# Patient Record
Sex: FEMALE | Race: WHITE | NOT HISPANIC OR LATINO | Employment: UNEMPLOYED | URBAN - NONMETROPOLITAN AREA
[De-identification: names, ages, dates, MRNs, and addresses within clinical notes are randomized per-mention and may not be internally consistent; named-entity substitution may affect disease eponyms.]

---

## 2023-01-26 PROBLEM — M54.16 CHRONIC RADICULAR LOW BACK PAIN: Status: ACTIVE | Noted: 2023-01-26

## 2023-01-26 PROBLEM — M50.10 CERVICAL DISC DISORDER WITH RADICULOPATHY: Status: ACTIVE | Noted: 2023-01-26

## 2023-01-26 PROBLEM — N28.1 SIMPLE CYST OF KIDNEY: Status: ACTIVE | Noted: 2023-01-26

## 2023-01-26 PROBLEM — H92.03 EARACHE SYMPTOMS IN BOTH EARS: Status: ACTIVE | Noted: 2023-01-26

## 2023-01-26 PROBLEM — G44.86 HEADACHE, CERVICOGENIC: Status: ACTIVE | Noted: 2023-01-26

## 2023-01-26 PROBLEM — R11.0 NAUSEA IN ADULT: Status: ACTIVE | Noted: 2023-01-26

## 2023-01-26 PROBLEM — M25.879 ANKLE IMPINGEMENT SYNDROME: Status: ACTIVE | Noted: 2023-01-26

## 2023-01-26 PROBLEM — M50.90 CERVICAL DISC DISEASE: Status: ACTIVE | Noted: 2023-01-26

## 2023-01-26 PROBLEM — T75.3XXA SEASICKNESS: Status: ACTIVE | Noted: 2023-01-26

## 2023-01-26 PROBLEM — R03.0 BLOOD PRESSURE ELEVATED WITHOUT HISTORY OF HTN: Status: ACTIVE | Noted: 2023-01-26

## 2023-01-26 PROBLEM — R09.81 NASAL CONGESTION: Status: ACTIVE | Noted: 2023-01-26

## 2023-01-26 PROBLEM — M25.579 ANKLE PAIN: Status: ACTIVE | Noted: 2023-01-26

## 2023-01-26 PROBLEM — M79.18 CERVICAL MYOFASCIAL PAIN SYNDROME: Status: ACTIVE | Noted: 2023-01-26

## 2023-01-26 PROBLEM — E78.5 DYSLIPIDEMIA: Status: ACTIVE | Noted: 2023-01-26

## 2023-01-26 PROBLEM — G89.18 POST-OP PAIN: Status: ACTIVE | Noted: 2023-01-26

## 2023-01-26 PROBLEM — K14.6 GLOSSODYNIA: Status: ACTIVE | Noted: 2023-01-26

## 2023-01-26 PROBLEM — N89.8 VAGINAL ITCHING: Status: ACTIVE | Noted: 2023-01-26

## 2023-01-26 PROBLEM — M47.816 ARTHRITIS OF LUMBAR SPINE: Status: ACTIVE | Noted: 2023-01-26

## 2023-01-26 PROBLEM — R94.31 ABNORMAL EKG: Status: ACTIVE | Noted: 2023-01-26

## 2023-01-26 PROBLEM — M96.1 CERVICAL POST-LAMINECTOMY SYNDROME: Status: ACTIVE | Noted: 2023-01-26

## 2023-01-26 PROBLEM — M54.2 CERVICAL PAIN: Status: ACTIVE | Noted: 2023-01-26

## 2023-01-26 PROBLEM — Q66.70 PES CAVUS, CONGENITAL: Status: ACTIVE | Noted: 2023-01-26

## 2023-01-26 PROBLEM — G89.29 CHRONIC RADICULAR LOW BACK PAIN: Status: ACTIVE | Noted: 2023-01-26

## 2023-01-26 PROBLEM — M25.373 INSTABILITY OF ANKLE: Status: ACTIVE | Noted: 2023-01-26

## 2023-01-26 PROBLEM — Z86.19 HISTORY OF COLD SORES: Status: ACTIVE | Noted: 2023-01-26

## 2023-01-26 PROBLEM — M54.6 BACK PAIN, THORACIC: Status: ACTIVE | Noted: 2023-01-26

## 2023-01-26 PROBLEM — M47.816 LUMBAR SPONDYLOSIS: Status: ACTIVE | Noted: 2023-01-26

## 2023-01-26 PROBLEM — K21.9 GERD (GASTROESOPHAGEAL REFLUX DISEASE): Status: ACTIVE | Noted: 2023-01-26

## 2023-01-26 PROBLEM — K58.0 IRRITABLE BOWEL SYNDROME WITH DIARRHEA: Status: ACTIVE | Noted: 2023-01-26

## 2023-01-26 PROBLEM — M47.812 SPONDYLOSIS OF CERVICAL REGION WITHOUT MYELOPATHY OR RADICULOPATHY: Status: ACTIVE | Noted: 2023-01-26

## 2023-01-26 PROBLEM — M51.16 DISPLACEMENT OF LUMBAR DISC WITH RADICULOPATHY: Status: ACTIVE | Noted: 2023-01-26

## 2023-01-26 PROBLEM — M54.81 BILATERAL OCCIPITAL NEURALGIA: Status: ACTIVE | Noted: 2023-01-26

## 2023-01-26 PROBLEM — R10.9 ABDOMINAL PAIN: Status: ACTIVE | Noted: 2023-01-26

## 2023-01-26 PROBLEM — B37.0 THRUSH, ORAL: Status: ACTIVE | Noted: 2023-01-26

## 2023-01-26 PROBLEM — E66.3 OVERWEIGHT WITH BODY MASS INDEX (BMI) OF 29 TO 29.9 IN ADULT: Status: ACTIVE | Noted: 2023-01-26

## 2023-01-26 PROBLEM — R74.8 ALKALINE PHOSPHATASE ELEVATION: Status: ACTIVE | Noted: 2023-01-26

## 2023-01-26 PROBLEM — M25.561 KNEE PAIN, RIGHT: Status: ACTIVE | Noted: 2023-01-26

## 2023-01-26 PROBLEM — G43.909 MIGRAINE: Status: ACTIVE | Noted: 2023-01-26

## 2023-01-26 PROBLEM — H92.01 RIGHT EAR PAIN: Status: ACTIVE | Noted: 2023-01-26

## 2023-01-26 PROBLEM — M54.6 THORACIC BACK PAIN: Status: ACTIVE | Noted: 2023-01-26

## 2023-01-26 PROBLEM — M51.26 LUMBAR HERNIATED DISC: Status: ACTIVE | Noted: 2023-01-26

## 2023-01-26 PROBLEM — S86.319A PERONEAL TENDON TEAR: Status: ACTIVE | Noted: 2023-01-26

## 2023-01-26 PROBLEM — R42 VERTIGO: Status: ACTIVE | Noted: 2023-01-26

## 2023-01-26 PROBLEM — M50.30 DDD (DEGENERATIVE DISC DISEASE), CERVICAL: Status: ACTIVE | Noted: 2023-01-26

## 2023-01-26 RX ORDER — PREGABALIN 75 MG/1
CAPSULE ORAL
COMMUNITY
Start: 2022-09-11 | End: 2023-03-09 | Stop reason: WASHOUT

## 2023-01-26 RX ORDER — TOPIRAMATE 100 MG/1
TABLET, FILM COATED ORAL
COMMUNITY
Start: 2020-01-03 | End: 2023-03-09 | Stop reason: WASHOUT

## 2023-01-26 RX ORDER — CLONAZEPAM 0.5 MG/1
TABLET ORAL
COMMUNITY
Start: 2022-09-22 | End: 2023-03-09 | Stop reason: WASHOUT

## 2023-01-26 RX ORDER — CHLORHEXIDINE GLUCONATE ORAL RINSE 1.2 MG/ML
SOLUTION DENTAL
COMMUNITY
End: 2023-03-09 | Stop reason: WASHOUT

## 2023-01-26 RX ORDER — MECLIZINE HCL 12.5 MG 12.5 MG/1
12.5 TABLET ORAL EVERY 8 HOURS PRN
COMMUNITY
End: 2023-05-05 | Stop reason: SDUPTHER

## 2023-01-26 RX ORDER — ONDANSETRON 4 MG/1
4 TABLET, ORALLY DISINTEGRATING ORAL 3 TIMES DAILY PRN
COMMUNITY
Start: 2022-07-08 | End: 2023-03-09 | Stop reason: SDUPTHER

## 2023-01-26 RX ORDER — IBUPROFEN 800 MG/1
TABLET ORAL
COMMUNITY
Start: 2015-06-24 | End: 2023-03-09 | Stop reason: WASHOUT

## 2023-01-26 RX ORDER — CLOTRIMAZOLE 10 MG/1
LOZENGE ORAL; TOPICAL
COMMUNITY
End: 2023-03-09 | Stop reason: WASHOUT

## 2023-01-26 RX ORDER — PROMETHAZINE HYDROCHLORIDE 12.5 MG/1
12.5 TABLET ORAL EVERY 6 HOURS PRN
COMMUNITY
End: 2023-03-09 | Stop reason: SDUPTHER

## 2023-01-26 RX ORDER — HYOSCYAMINE SULFATE 0.125 MG
0.12 TABLET ORAL
COMMUNITY
End: 2024-01-02

## 2023-01-26 RX ORDER — CYCLOBENZAPRINE HCL 10 MG
TABLET ORAL
COMMUNITY
Start: 2015-09-02 | End: 2023-03-09

## 2023-01-26 RX ORDER — PRAVASTATIN SODIUM 10 MG/1
10 TABLET ORAL DAILY
COMMUNITY
End: 2023-03-09 | Stop reason: SDUPTHER

## 2023-01-26 RX ORDER — ESOMEPRAZOLE MAGNESIUM 40 MG/1
CAPSULE, DELAYED RELEASE ORAL
COMMUNITY
Start: 2020-12-02 | End: 2024-04-05 | Stop reason: SDUPTHER

## 2023-01-26 RX ORDER — RIZATRIPTAN BENZOATE 10 MG/1
TABLET ORAL
COMMUNITY
Start: 2016-02-03 | End: 2023-03-09 | Stop reason: SDUPTHER

## 2023-03-09 ENCOUNTER — OFFICE VISIT (OUTPATIENT)
Dept: PRIMARY CARE | Facility: CLINIC | Age: 63
End: 2023-03-09
Payer: COMMERCIAL

## 2023-03-09 VITALS
TEMPERATURE: 97.8 F | OXYGEN SATURATION: 96 % | WEIGHT: 206.8 LBS | SYSTOLIC BLOOD PRESSURE: 112 MMHG | HEART RATE: 92 BPM | DIASTOLIC BLOOD PRESSURE: 60 MMHG | HEIGHT: 71 IN | BODY MASS INDEX: 28.95 KG/M2

## 2023-03-09 DIAGNOSIS — G44.86 HEADACHE, CERVICOGENIC: Primary | ICD-10-CM

## 2023-03-09 DIAGNOSIS — E78.5 DYSLIPIDEMIA: ICD-10-CM

## 2023-03-09 DIAGNOSIS — R11.0 NAUSEA: ICD-10-CM

## 2023-03-09 PROCEDURE — 99214 OFFICE O/P EST MOD 30 MIN: CPT | Performed by: PHYSICIAN ASSISTANT

## 2023-03-09 PROCEDURE — 1036F TOBACCO NON-USER: CPT | Performed by: PHYSICIAN ASSISTANT

## 2023-03-09 PROCEDURE — 96372 THER/PROPH/DIAG INJ SC/IM: CPT | Performed by: PHYSICIAN ASSISTANT

## 2023-03-09 RX ORDER — METHYLPREDNISOLONE SODIUM SUCCINATE 125 MG/2ML
125 INJECTION INTRAMUSCULAR; INTRAVENOUS ONCE
Status: COMPLETED | OUTPATIENT
Start: 2023-03-09 | End: 2023-03-09

## 2023-03-09 RX ORDER — PROMETHAZINE HYDROCHLORIDE 12.5 MG/1
12.5 TABLET ORAL EVERY 6 HOURS PRN
Qty: 30 TABLET | Refills: 3 | Status: SHIPPED | OUTPATIENT
Start: 2023-03-09 | End: 2023-03-09 | Stop reason: WASHOUT

## 2023-03-09 RX ORDER — RIZATRIPTAN BENZOATE 10 MG/1
TABLET ORAL
Qty: 9 TABLET | Refills: 3 | Status: SHIPPED | OUTPATIENT
Start: 2023-03-09 | End: 2023-04-14 | Stop reason: SDUPTHER

## 2023-03-09 RX ORDER — IBUPROFEN 800 MG/1
800 TABLET ORAL EVERY 8 HOURS PRN
COMMUNITY
End: 2023-07-18 | Stop reason: SDUPTHER

## 2023-03-09 RX ORDER — PRAVASTATIN SODIUM 10 MG/1
10 TABLET ORAL NIGHTLY
Qty: 30 TABLET | Refills: 5 | Status: SHIPPED | OUTPATIENT
Start: 2023-03-09 | End: 2023-10-12

## 2023-03-09 RX ORDER — METHOCARBAMOL 750 MG/1
1 TABLET, FILM COATED ORAL 3 TIMES DAILY
COMMUNITY
Start: 2023-02-13 | End: 2023-04-14 | Stop reason: SDUPTHER

## 2023-03-09 RX ORDER — ONDANSETRON 4 MG/1
4 TABLET, ORALLY DISINTEGRATING ORAL 3 TIMES DAILY PRN
Qty: 20 TABLET | Refills: 3 | Status: SHIPPED | OUTPATIENT
Start: 2023-03-09 | End: 2023-04-14 | Stop reason: WASHOUT

## 2023-03-09 RX ORDER — PROMETHAZINE HYDROCHLORIDE 25 MG/1
25 TABLET ORAL EVERY 8 HOURS PRN
COMMUNITY
Start: 2022-12-05 | End: 2023-03-10 | Stop reason: ALTCHOICE

## 2023-03-09 RX ORDER — PREDNISONE 20 MG/1
20 TABLET ORAL 2 TIMES DAILY
Qty: 10 TABLET | Refills: 0 | Status: SHIPPED | OUTPATIENT
Start: 2023-03-09 | End: 2023-03-14

## 2023-03-09 RX ORDER — PREGABALIN 75 MG/1
75 CAPSULE ORAL 4 TIMES DAILY
COMMUNITY
End: 2023-04-14 | Stop reason: SDUPTHER

## 2023-03-09 RX ORDER — CLONAZEPAM 0.5 MG/1
0.5 TABLET ORAL NIGHTLY
COMMUNITY
End: 2023-07-18 | Stop reason: SDUPTHER

## 2023-03-09 RX ORDER — ONDANSETRON 4 MG/1
4 TABLET, FILM COATED ORAL EVERY 8 HOURS PRN
COMMUNITY
End: 2023-10-20 | Stop reason: SDUPTHER

## 2023-03-09 RX ORDER — TOPIRAMATE 100 MG/1
100 TABLET, FILM COATED ORAL DAILY
COMMUNITY
End: 2023-10-19 | Stop reason: SDUPTHER

## 2023-03-09 RX ADMIN — METHYLPREDNISOLONE SODIUM SUCCINATE 125 MG: 125 INJECTION INTRAMUSCULAR; INTRAVENOUS at 17:36

## 2023-03-09 ASSESSMENT — PATIENT HEALTH QUESTIONNAIRE - PHQ9
1. LITTLE INTEREST OR PLEASURE IN DOING THINGS: NOT AT ALL
SUM OF ALL RESPONSES TO PHQ9 QUESTIONS 1 AND 2: 0
2. FEELING DOWN, DEPRESSED OR HOPELESS: NOT AT ALL

## 2023-03-09 ASSESSMENT — ENCOUNTER SYMPTOMS
DEPRESSION: 0
LOSS OF SENSATION IN FEET: 0
OCCASIONAL FEELINGS OF UNSTEADINESS: 0

## 2023-03-09 NOTE — PROGRESS NOTES
Subjective   Patient ID: Lorraine Trivedi is a 62 y.o. female who presents for Migraine and Neck Pain (Requesting steroid pack for neck pain, cervical neuralgia.  Started on pravastatin last visit, no issues.  Dr. Kimberly Ely, Pain management is retiring, have 5 rx's with her, lyrica, methacarbomal, clonazepam, topiramate, ibuprofen.  Need refills).    HPI   Lorraine Trivedi is a 62 y.o. year old female patient with   Past Medical History:   Diagnosis Date    Radiculopathy, lumbar region 07/22/2015    Chronic radicular lumbar pain    presenting to clinic with concern for   Chief Complaint   Patient presents with    Migraine    Neck Pain     Requesting steroid pack for neck pain, cervical neuralgia.  Started on pravastatin last visit, no issues.  Dr. Kimberly Ely, Pain management is retiring, have 5 rx's with her, lyrica, methacarbomal, clonazepam, topiramate, ibuprofen.  Need refills      Current Outpatient Medications:     clonazePAM (KlonoPIN) 0.5 mg tablet, TAKE 1 TABLET AT BEDTIME., Disp: , Rfl:     esomeprazole (NexIUM) 40 mg DR capsule, TAKE 1 CAPSULE TWICE DAILY 30 MINUTES PRIOR TO BREAKFAST AND DINNER., Disp: , Rfl:     ibuprofen 800 mg tablet, TAKE ONE TABLET BY MOUTH THREE TIMES A DAY, Disp: , Rfl:     meclizine (Antivert) 12.5 mg tablet, Take 1 tablet (12.5 mg) by mouth every 8 hours if needed (For Vertigo)., Disp: , Rfl:     pregabalin (Lyrica) 75 mg capsule, TAKE 1 CAPSULE 4 TIMES DAILY, Disp: , Rfl:     rifAXIMin (Xifaxan) 550 mg tablet, Take 1 tablet (550 mg) by mouth in the morning and 1 tablet (550 mg) in the evening and 1 tablet (550 mg) before bedtime., Disp: , Rfl:     topiramate (Topamax) 100 mg tablet, TAKE ONE TABLET BY MOUTH EVERY DAY, Disp: , Rfl:     chlorhexidine (Peridex) 0.12 % solution, Soak dental appliance in solution 10 minutes and then rinse thoroughly. Brush your dentures as usual., Disp: , Rfl:     clotrimazole (Mycelex) 10 mg ernestine, Allow 1 to dissolve slowly in mouth 5 times daily as  "directed., Disp: , Rfl:     hyoscyamine (Anaspaz, Levsin) 0.125 mg tablet, Take 1 tablet (0.125 mg) by mouth. 3-4 times daily., Disp: , Rfl:     ondansetron ODT (Zofran-ODT) 4 mg disintegrating tablet, Take 1 tablet (4 mg) by mouth 3 times a day as needed for nausea., Disp: 20 tablet, Rfl: 3    pravastatin (Pravachol) 10 mg tablet, Take 1 tablet (10 mg) by mouth once daily at bedtime., Disp: 30 tablet, Rfl: 5    predniSONE (Deltasone) 20 mg tablet, Take 1 tablet (20 mg) by mouth in the morning and 1 tablet (20 mg) before bedtime. Do all this for 5 days., Disp: 10 tablet, Rfl: 0    promethazine (Phenergan) 12.5 mg tablet, Take 1 tablet (12.5 mg) by mouth every 6 hours if needed for nausea., Disp: 30 tablet, Rfl: 3    rizatriptan (Maxalt) 10 mg tablet, take 1 tablet by mouth at onset of headache, may repeat every 2 hours as needed. max of 3 tablets in 24 hours, Disp: 9 tablet, Rfl: 3.       Review of Systems  Review of Systems:   Constitutional: Denies fever  HEENT: Denies ST, earache  CVS: Denies Chest pain  Pulmonary: Denies wheezing, SOB  GI: Denies N/V  : Denies dysuria  Musculoskeletal:  Denies myalgia  Neuro: Denies focal weakness or numbness.  Skin: Denies Rashes.  *Review of Systems is negative unless otherwise mentioned in HPI or ROS above.  Objective   /60   Pulse 92   Temp 36.6 °C (97.8 °F)   Ht 1.803 m (5' 11\")   Wt 93.8 kg (206 lb 12.8 oz)   SpO2 96%   BMI 28.84 kg/m²     Physical Exam  Physical exam:  /60   Pulse 92   Temp 36.6 °C (97.8 °F)   Ht 1.803 m (5' 11\")   Wt 93.8 kg (206 lb 12.8 oz)   SpO2 96%   BMI 28.84 kg/m²  reviewed Body mass index is 28.84 kg/m².   Constitutional: NAD. Afebrile. Resting comfortably.  ENT: Nasal mucosa and oropharynx: moist oral mucosa. Posterior oropharynx nonerythematous. No posterior pharyngeal streaking. Left forehead and lateral cheek facial tenderness noted   Eyes: PERRLA. EOM intact. Photophobia noted.  Cardiac: Regular rate & rhythm. No " murmur, gallops, or rubs.  Pulmonary: Lungs clear to auscultation bilaterally with good aeration. No wheezes, rhonchi, or rales.   GI: Soft, Nontender, nondistended.   Musculoskeletal: No peripheral edema.   Skin: No evidence of trauma. No rashes  Neuro: No focal neuro deficits. Normal gait without assistive devices.  Psych: Intact judgement and insight.    Assessment/Plan   Problem List Items Addressed This Visit       Dyslipidemia    Relevant Medications    pravastatin (Pravachol) 10 mg tablet    Headache, cervicogenic - Primary    Relevant Medications    rizatriptan (Maxalt) 10 mg tablet    predniSONE (Deltasone) 20 mg tablet    methylPREDNISolone sodium succinate (PF) (SOLU-Medrol) injection 125 mg     Other Visit Diagnoses       Nausea        Relevant Medications    ondansetron ODT (Zofran-ODT) 4 mg disintegrating tablet                I did agree to take over her Rx for Lyrica 75mg, topamax 100mg (for migraines/occipital neuralgia), ibuprofen 800mg (which we agreed that I will reduce to 600mg at the same frequency), methocarbamol 750mg tid PRN (muscle relaxant replaced flexeril a few months ago).    OARRS reviewe   Benzo agreement signed in EMR  Lyrica agreement signed- return in 30 days to begin Rx here

## 2023-03-10 DIAGNOSIS — R11.0 NAUSEA: Primary | ICD-10-CM

## 2023-03-10 RX ORDER — PROMETHAZINE HYDROCHLORIDE 12.5 MG/1
TABLET ORAL
Qty: 15 TABLET | Refills: 0 | OUTPATIENT
Start: 2023-03-10

## 2023-03-10 RX ORDER — PROMETHAZINE HYDROCHLORIDE 12.5 MG/1
12.5 TABLET ORAL EVERY 6 HOURS PRN
Qty: 30 TABLET | Refills: 3 | Status: SHIPPED | OUTPATIENT
Start: 2023-03-10 | End: 2023-03-11 | Stop reason: SDUPTHER

## 2023-03-11 DIAGNOSIS — R11.0 NAUSEA: ICD-10-CM

## 2023-03-11 RX ORDER — PROMETHAZINE HYDROCHLORIDE 12.5 MG/1
12.5 TABLET ORAL EVERY 6 HOURS PRN
Qty: 30 TABLET | Refills: 3 | Status: SHIPPED | OUTPATIENT
Start: 2023-03-11 | End: 2023-04-14 | Stop reason: SDUPTHER

## 2023-04-11 ENCOUNTER — APPOINTMENT (OUTPATIENT)
Dept: PRIMARY CARE | Facility: CLINIC | Age: 63
End: 2023-04-11
Payer: COMMERCIAL

## 2023-04-14 ENCOUNTER — OFFICE VISIT (OUTPATIENT)
Dept: PRIMARY CARE | Facility: CLINIC | Age: 63
End: 2023-04-14
Payer: COMMERCIAL

## 2023-04-14 VITALS
WEIGHT: 204 LBS | TEMPERATURE: 98.2 F | HEART RATE: 88 BPM | SYSTOLIC BLOOD PRESSURE: 142 MMHG | DIASTOLIC BLOOD PRESSURE: 73 MMHG | HEIGHT: 70 IN | BODY MASS INDEX: 29.2 KG/M2 | OXYGEN SATURATION: 96 %

## 2023-04-14 DIAGNOSIS — G43.709 CHRONIC MIGRAINE WITHOUT AURA WITHOUT STATUS MIGRAINOSUS, NOT INTRACTABLE: ICD-10-CM

## 2023-04-14 DIAGNOSIS — R11.0 NAUSEA: ICD-10-CM

## 2023-04-14 DIAGNOSIS — Z12.31 ENCOUNTER FOR SCREENING MAMMOGRAM FOR BREAST CANCER: Primary | ICD-10-CM

## 2023-04-14 DIAGNOSIS — D17.1 LIPOMA OF BACK: ICD-10-CM

## 2023-04-14 DIAGNOSIS — R42 DIZZINESS: ICD-10-CM

## 2023-04-14 DIAGNOSIS — G44.86 HEADACHE, CERVICOGENIC: ICD-10-CM

## 2023-04-14 DIAGNOSIS — E78.5 BORDERLINE HYPERLIPIDEMIA: ICD-10-CM

## 2023-04-14 DIAGNOSIS — R53.1 GENERAL WEAKNESS: ICD-10-CM

## 2023-04-14 DIAGNOSIS — N39.0 URINARY TRACT INFECTION WITHOUT HEMATURIA, SITE UNSPECIFIED: ICD-10-CM

## 2023-04-14 LAB
POC APPEARANCE, URINE: CLEAR
POC BILIRUBIN, URINE: NEGATIVE
POC BLOOD, URINE: NEGATIVE
POC COLOR, URINE: YELLOW
POC GLUCOSE, URINE: NEGATIVE MG/DL
POC KETONES, URINE: NEGATIVE MG/DL
POC LEUKOCYTES, URINE: NEGATIVE
POC NITRITE,URINE: NEGATIVE
POC PH, URINE: 6 PH
POC PROTEIN, URINE: NEGATIVE MG/DL
POC SPECIFIC GRAVITY, URINE: 1.01
POC UROBILINOGEN, URINE: 0.2 EU/DL

## 2023-04-14 PROCEDURE — 81003 URINALYSIS AUTO W/O SCOPE: CPT | Performed by: PHYSICIAN ASSISTANT

## 2023-04-14 PROCEDURE — 99214 OFFICE O/P EST MOD 30 MIN: CPT | Performed by: PHYSICIAN ASSISTANT

## 2023-04-14 PROCEDURE — 1036F TOBACCO NON-USER: CPT | Performed by: PHYSICIAN ASSISTANT

## 2023-04-14 RX ORDER — METHOCARBAMOL 750 MG/1
750 TABLET, FILM COATED ORAL 3 TIMES DAILY PRN
Qty: 270 TABLET | Refills: 0 | Status: SHIPPED | OUTPATIENT
Start: 2023-04-14 | End: 2023-07-18 | Stop reason: SDUPTHER

## 2023-04-14 RX ORDER — PREGABALIN 75 MG/1
75 CAPSULE ORAL 3 TIMES DAILY
Qty: 90 CAPSULE | Refills: 0 | Status: SHIPPED | OUTPATIENT
Start: 2023-05-15 | End: 2023-04-17 | Stop reason: DRUGHIGH

## 2023-04-14 RX ORDER — RIZATRIPTAN BENZOATE 10 MG/1
TABLET ORAL
Qty: 30 TABLET | Refills: 3 | Status: SHIPPED | OUTPATIENT
Start: 2023-04-14 | End: 2023-05-25 | Stop reason: ALTCHOICE

## 2023-04-14 RX ORDER — PREGABALIN 75 MG/1
75 CAPSULE ORAL 3 TIMES DAILY
Qty: 90 CAPSULE | Refills: 0 | Status: SHIPPED | OUTPATIENT
Start: 2023-06-16 | End: 2023-04-17 | Stop reason: DRUGHIGH

## 2023-04-14 RX ORDER — PREGABALIN 75 MG/1
75 CAPSULE ORAL 3 TIMES DAILY
Qty: 90 CAPSULE | Refills: 0 | Status: SHIPPED | OUTPATIENT
Start: 2023-04-14 | End: 2023-04-17 | Stop reason: DRUGHIGH

## 2023-04-14 RX ORDER — PROMETHAZINE HYDROCHLORIDE 12.5 MG/1
25 TABLET ORAL EVERY 6 HOURS PRN
Qty: 90 TABLET | Refills: 3 | Status: SHIPPED | OUTPATIENT
Start: 2023-04-14 | End: 2023-05-24 | Stop reason: SDUPTHER

## 2023-04-14 ASSESSMENT — ANXIETY QUESTIONNAIRES
7. FEELING AFRAID AS IF SOMETHING AWFUL MIGHT HAPPEN: NOT AT ALL
6. BECOMING EASILY ANNOYED OR IRRITABLE: SEVERAL DAYS
4. TROUBLE RELAXING: NOT AT ALL
5. BEING SO RESTLESS THAT IT IS HARD TO SIT STILL: NOT AT ALL
1. FEELING NERVOUS, ANXIOUS, OR ON EDGE: NOT AT ALL
3. WORRYING TOO MUCH ABOUT DIFFERENT THINGS: NOT AT ALL
IF YOU CHECKED OFF ANY PROBLEMS ON THIS QUESTIONNAIRE, HOW DIFFICULT HAVE THESE PROBLEMS MADE IT FOR YOU TO DO YOUR WORK, TAKE CARE OF THINGS AT HOME, OR GET ALONG WITH OTHER PEOPLE: NOT DIFFICULT AT ALL
GAD7 TOTAL SCORE: 1
2. NOT BEING ABLE TO STOP OR CONTROL WORRYING: NOT AT ALL

## 2023-04-14 NOTE — PROGRESS NOTES
Subjective   Patient ID: Lorraine Trivedi is a 62 y.o. female who presents for Follow-up (Had a steroid injection on neck. Who was doing the injection noticed a lump on her back.), Nausea (Been peeing a lot, nausea all time, headaches all the time, does not feel well, tired, no energy, been going on for awhile.), and Med Refill (Lyrica refill).    HPI   Lorraine Trivedi is a 62 y.o. year old female patient with presenting to clinic with concern for   Chief Complaint   Patient presents with    Follow-up     Had a steroid injection on neck. Who was doing the injection noticed a lump on her back.    Nausea     Been peeing a lot, nausea all time, headaches all the time, does not feel well, tired, no energy, been going on for awhile.    Med Refill     Lyrica refill         Neurologist is planning to do botox injections of head and neck for migraine pain and neck. Neuro will also be doing her nerve blocks.     Pain mgt doctor is leaving the area and she had her last cervical block this week- has been more nauseous than usual this week.    Patient Active Problem List   Diagnosis    Abdominal pain    Abnormal EKG    Alkaline phosphatase elevation    Ankle impingement syndrome    Ankle pain    Blood pressure elevated without history of HTN    Cervical disc disease    DDD (degenerative disc disease), cervical    Cervical disc disorder with radiculopathy    Spondylosis of cervical region without myelopathy or radiculopathy    Bilateral occipital neuralgia    Cervical myofascial pain syndrome    Cervical pain    Cervical post-laminectomy syndrome    Chronic radicular low back pain    Dyslipidemia    GERD (gastroesophageal reflux disease)    Headache, cervicogenic    History of cold sores    Irritable bowel syndrome with diarrhea    Instability of ankle    Knee pain, right    Lumbar herniated disc    Displacement of lumbar disc with radiculopathy    Arthritis of lumbar spine    Lumbar spondylosis    Migraine    Nasal congestion     Peroneal tendon tear    Pes cavus, congenital    Post-op pain    Right ear pain    Seasickness    Simple cyst of kidney    Back pain, thoracic    Thoracic back pain    Vaginal itching    Vertigo    Thrush, oral    Overweight with body mass index (BMI) of 29 to 29.9 in adult    Nausea in adult    Glossodynia    Earache symptoms in both ears       Current Outpatient Medications:     clonazePAM (KlonoPIN) 0.5 mg tablet, Take 1 tablet (0.5 mg) by mouth once daily at bedtime. To help sleep with pain and anxiety, Disp: , Rfl:     esomeprazole (NexIUM) 40 mg DR capsule, TAKE 1 CAPSULE TWICE DAILY 30 MINUTES PRIOR TO BREAKFAST AND DINNER., Disp: , Rfl:     hyoscyamine (Anaspaz, Levsin) 0.125 mg tablet, Take 1 tablet (0.125 mg) by mouth. 3-4 times daily., Disp: , Rfl:     ibuprofen 800 mg tablet, Take 1 tablet (800 mg) by mouth every 8 hours if needed for mild pain (1 - 3), headaches or moderate pain (4 - 6)., Disp: , Rfl:     meclizine (Antivert) 12.5 mg tablet, Take 1 tablet (12.5 mg) by mouth every 8 hours if needed (For Vertigo)., Disp: , Rfl:     methocarbamol (Robaxin) 750 mg tablet, Take 1 tablet (750 mg) by mouth in the morning and 1 tablet (750 mg) in the evening and 1 tablet (750 mg) before bedtime. PRN., Disp: , Rfl:     ondansetron (Zofran) 4 mg tablet, Take 1 tablet (4 mg) by mouth every 8 hours if needed for nausea or vomiting., Disp: , Rfl:     pravastatin (Pravachol) 10 mg tablet, Take 1 tablet (10 mg) by mouth once daily at bedtime., Disp: 30 tablet, Rfl: 5    pregabalin (Lyrica) 75 mg capsule, Take 1 capsule (75 mg) by mouth in the morning and 1 capsule (75 mg) at noon and 1 capsule (75 mg) in the evening and 1 capsule (75 mg) before bedtime., Disp: , Rfl:     promethazine (Phenergan) 12.5 mg tablet, Take 1 tablet (12.5 mg) by mouth every 6 hours if needed for nausea or vomiting., Disp: 30 tablet, Rfl: 3    rifAXIMin (Xifaxan) 550 mg tablet, Take 1 tablet (550 mg) by mouth in the morning and 1 tablet (550  "mg) in the evening and 1 tablet (550 mg) before bedtime., Disp: , Rfl:     rizatriptan (Maxalt) 10 mg tablet, take 1 tablet by mouth at onset of headache, may repeat every 2 hours as needed. max of 3 tablets in 24 hours, Disp: 9 tablet, Rfl: 3    topiramate (Topamax) 100 mg tablet, Take 1 tablet (100 mg) by mouth once daily., Disp: , Rfl:     AGNES Reviewed today  ___________________________________________________________  Per last visit 3/9/23  I did agree to take over her Rx for Lyrica 75mg, topamax 100mg (for migraines/occipital neuralgia), ibuprofen 800mg (which we agreed that I will reduce to 600mg at the same frequency), methocarbamol 750mg tid PRN (muscle relaxant replaced flexeril a few months ago).     O[facility]RS reviewe  Benzo agreement signed in EMR  Lyrica agreement signed  _____________________________________________________________  She is returning today to begin these Rx as we had discussed.   Reviewed outside notes from neurology and most recent pain mgt note     Review of Systems  Review of Systems:   Constitutional: Denies fever  HEENT: Denies ST, earache  CVS: Denies Chest pain  Pulmonary: Denies wheezing, SOB  GI: Denies N/V  : Denies dysuria  Musculoskeletal:  Denies myalgia  Neuro: Denies focal weakness or numbness.  Skin: Denies Rashes.  *Review of Systems is negative unless otherwise mentioned in HPI or ROS above.  Objective   /73   Pulse 88   Temp 36.8 °C (98.2 °F)   Ht 1.778 m (5' 10\")   Wt 92.5 kg (204 lb)   SpO2 96%   BMI 29.27 kg/m²     Physical Exam  Physical exam:  /73   Pulse 88   Temp 36.8 °C (98.2 °F)   Ht 1.778 m (5' 10\")   Wt 92.5 kg (204 lb)   SpO2 96%   BMI 29.27 kg/m²  reviewed   Body mass index is 29.27 kg/m².     Constitutional: NAD.  Resting comfortably.  Head: Atraumatic, normocephalic.  EENT: deferred d/t masking  Cardiac: Regular rate & rhythm.   Pulmonary: Lungs clear bilat  GI: Soft, Nontender, nondistended.   Musculoskeletal: No peripheral " edema.   Skin: No evidence of trauma. No rashes. Small lipoma noted left mid thorax adjacent to tip of scapula  Psych: Intact judgement and insight.  Recent Results (from the past 4 hour(s))   POCT UA Automated manually resulted    Collection Time: 04/14/23 11:52 AM   Result Value Ref Range    POC Color, Urine Yellow Straw, Yellow, Light Yellow    POC Appearance, Urine Clear Clear    POC Specific Gravity, Urine 1.010 1.005 - 1.035    POC PH, Urine 6.0 No Reference Range Established PH    POC Protein, Urine NEGATIVE NEGATIVE, 30 (1+) mg/dl    POC Glucose, Urine NEGATIVE NEGATIVE mg/dl    POC Blood, Urine NEGATIVE NEGATIVE    POC Ketones, Urine NEGATIVE NEGATIVE mg/dl    POC Bilirubin, Urine NEGATIVE NEGATIVE    POC Urobilinogen, Urine 0.2 0.2, 1.0 EU/DL    Poc Nitrate, Urine NEGATIVE NEGATIVE    POC Leukocytes, Urine NEGATIVE NEGATIVE      Assessment/Plan   Problem List Items Addressed This Visit       Headache, cervicogenic    Relevant Medications    pregabalin (Lyrica) 75 mg capsule    pregabalin (Lyrica) 75 mg capsule (Start on 5/15/2023)    pregabalin (Lyrica) 75 mg capsule (Start on 6/16/2023)    rizatriptan (Maxalt) 10 mg tablet    methocarbamol (Robaxin) 750 mg tablet    Migraine    Relevant Medications    rizatriptan (Maxalt) 10 mg tablet     Other Visit Diagnoses       Encounter for screening mammogram for breast cancer    -  Primary    Relevant Orders    BI mammo bilateral screening tomosynthesis    Lipoma of back        Nausea        Relevant Medications    promethazine (Phenergan) 12.5 mg tablet    Other Relevant Orders    Magnesium    Vitamin B12    Lipase    Dizziness        Relevant Orders    Magnesium    Vitamin B12    CBC    Comprehensive Metabolic Panel    General weakness        Relevant Orders    Magnesium    Vitamin B12    CBC    Comprehensive Metabolic Panel    TSH with reflex to Free T4 if abnormal    Lipid Panel    Borderline hyperlipidemia        Relevant Orders    TSH with reflex to Free T4  if abnormal    Lipid Panel    Urinary tract infection without hematuria, site unspecified        Relevant Orders    POCT UA Automated manually resulted (Completed)            Current Outpatient Medications:     clonazePAM (KlonoPIN) 0.5 mg tablet, Take 1 tablet (0.5 mg) by mouth once daily at bedtime. To help sleep with pain and anxiety, Disp: , Rfl:     esomeprazole (NexIUM) 40 mg DR capsule, TAKE 1 CAPSULE TWICE DAILY 30 MINUTES PRIOR TO BREAKFAST AND DINNER., Disp: , Rfl:     hyoscyamine (Anaspaz, Levsin) 0.125 mg tablet, Take 1 tablet (0.125 mg) by mouth. 3-4 times daily., Disp: , Rfl:     ibuprofen 800 mg tablet, Take 1 tablet (800 mg) by mouth every 8 hours if needed for mild pain (1 - 3), headaches or moderate pain (4 - 6)., Disp: , Rfl:     meclizine (Antivert) 12.5 mg tablet, Take 1 tablet (12.5 mg) by mouth every 8 hours if needed (For Vertigo)., Disp: , Rfl:     ondansetron (Zofran) 4 mg tablet, Take 1 tablet (4 mg) by mouth every 8 hours if needed for nausea or vomiting., Disp: , Rfl:     pravastatin (Pravachol) 10 mg tablet, Take 1 tablet (10 mg) by mouth once daily at bedtime., Disp: 30 tablet, Rfl: 5    rifAXIMin (Xifaxan) 550 mg tablet, Take 1 tablet (550 mg) by mouth in the morning and 1 tablet (550 mg) in the evening and 1 tablet (550 mg) before bedtime., Disp: , Rfl:     topiramate (Topamax) 100 mg tablet, Take 1 tablet (100 mg) by mouth once daily., Disp: , Rfl:     methocarbamol (Robaxin) 750 mg tablet, Take 1 tablet (750 mg) by mouth 3 times a day as needed for muscle spasms. PRN, Disp: 270 tablet, Rfl: 0    pregabalin (Lyrica) 75 mg capsule, Take 1 capsule (75 mg) by mouth in the morning and 1 capsule (75 mg) in the evening and 1 capsule (75 mg) before bedtime., Disp: 90 capsule, Rfl: 0    [START ON 5/15/2023] pregabalin (Lyrica) 75 mg capsule, Take 1 capsule (75 mg) by mouth in the morning and 1 capsule (75 mg) in the evening and 1 capsule (75 mg) before bedtime. Do not start before May 15,  2023., Disp: 90 capsule, Rfl: 0    [START ON 6/16/2023] pregabalin (Lyrica) 75 mg capsule, Take 1 capsule (75 mg) by mouth in the morning and 1 capsule (75 mg) in the evening and 1 capsule (75 mg) before bedtime. Do not start before June 16, 2023., Disp: 90 capsule, Rfl: 0    promethazine (Phenergan) 12.5 mg tablet, Take 2 tablets (25 mg) by mouth every 6 hours if needed for nausea or vomiting., Disp: 90 tablet, Rfl: 3    rizatriptan (Maxalt) 10 mg tablet, take 1 tablet by mouth at onset of headache, may repeat every 2 hours as needed. max of 3 tablets in 24 hours, Disp: 30 tablet, Rfl: 3        Refilled maxalt, robaxin and increased phenergan dose to 25mg  Lyrica- 1 month at a time over the course of 3 months as discussed     (Did not refill Klonazepam today- pt stated she had enough, unclear if she has enough for 3 months until next visit, ok to fill before next visit BZD consent form signed last visit in Epic) KG      Needs another visit in 3 months.

## 2023-04-17 DIAGNOSIS — M50.10 CERVICAL DISC DISORDER WITH RADICULOPATHY: Primary | ICD-10-CM

## 2023-04-17 DIAGNOSIS — M54.81 BILATERAL OCCIPITAL NEURALGIA: ICD-10-CM

## 2023-04-17 RX ORDER — PREGABALIN 75 MG/1
CAPSULE ORAL
Qty: 112 CAPSULE | Refills: 0 | Status: SHIPPED | OUTPATIENT
Start: 2023-04-17 | End: 2023-07-17 | Stop reason: WASHOUT

## 2023-04-17 RX ORDER — PREGABALIN 75 MG/1
CAPSULE ORAL
Qty: 112 CAPSULE | Refills: 0 | Status: SHIPPED | OUTPATIENT
Start: 2023-06-13 | End: 2023-07-17 | Stop reason: SDUPTHER

## 2023-04-17 RX ORDER — PREGABALIN 75 MG/1
CAPSULE ORAL
Qty: 112 CAPSULE | Refills: 0 | Status: SHIPPED | OUTPATIENT
Start: 2023-05-16 | End: 2023-07-17 | Stop reason: WASHOUT

## 2023-04-17 NOTE — PROGRESS NOTES
Error in dosing.   Not tid. Intended to be 75mg lyrica 1 capsule qam, 1capsule afternoon, 2 capsule at bedtime.

## 2023-04-18 ENCOUNTER — APPOINTMENT (OUTPATIENT)
Dept: PRIMARY CARE | Facility: CLINIC | Age: 63
End: 2023-04-18
Payer: COMMERCIAL

## 2023-05-05 ENCOUNTER — PATIENT MESSAGE (OUTPATIENT)
Dept: PRIMARY CARE | Facility: CLINIC | Age: 63
End: 2023-05-05
Payer: COMMERCIAL

## 2023-05-05 DIAGNOSIS — R42 VERTIGO: Primary | ICD-10-CM

## 2023-05-05 RX ORDER — MECLIZINE HCL 12.5 MG 12.5 MG/1
12.5 TABLET ORAL EVERY 8 HOURS PRN
Qty: 180 TABLET | Refills: 3 | Status: SHIPPED | OUTPATIENT
Start: 2023-05-05 | End: 2023-10-19 | Stop reason: SDUPTHER

## 2023-05-24 ENCOUNTER — OFFICE VISIT (OUTPATIENT)
Dept: PRIMARY CARE | Facility: CLINIC | Age: 63
End: 2023-05-24
Payer: COMMERCIAL

## 2023-05-24 VITALS
HEART RATE: 71 BPM | OXYGEN SATURATION: 98 % | TEMPERATURE: 98.6 F | SYSTOLIC BLOOD PRESSURE: 104 MMHG | BODY MASS INDEX: 29.95 KG/M2 | HEIGHT: 70 IN | DIASTOLIC BLOOD PRESSURE: 60 MMHG | WEIGHT: 209.2 LBS

## 2023-05-24 DIAGNOSIS — L30.9 DERMATITIS: Primary | ICD-10-CM

## 2023-05-24 DIAGNOSIS — R11.0 NAUSEA: ICD-10-CM

## 2023-05-24 DIAGNOSIS — Z71.89 MEDICATION CARE PLAN DISCUSSED WITH PATIENT: ICD-10-CM

## 2023-05-24 PROBLEM — R03.0 BLOOD PRESSURE ELEVATED WITHOUT HISTORY OF HTN: Status: RESOLVED | Noted: 2023-01-26 | Resolved: 2023-05-24

## 2023-05-24 PROBLEM — T75.3XXA SEASICKNESS: Status: RESOLVED | Noted: 2023-01-26 | Resolved: 2023-05-24

## 2023-05-24 PROBLEM — M50.30 DDD (DEGENERATIVE DISC DISEASE), CERVICAL: Status: RESOLVED | Noted: 2023-01-26 | Resolved: 2023-05-24

## 2023-05-24 PROBLEM — B37.0 THRUSH, ORAL: Status: RESOLVED | Noted: 2023-01-26 | Resolved: 2023-05-24

## 2023-05-24 PROBLEM — K14.6 GLOSSODYNIA: Status: RESOLVED | Noted: 2023-01-26 | Resolved: 2023-05-24

## 2023-05-24 PROBLEM — R74.8 ALKALINE PHOSPHATASE ELEVATION: Status: RESOLVED | Noted: 2023-01-26 | Resolved: 2023-05-24

## 2023-05-24 PROBLEM — M25.579 ANKLE PAIN: Status: RESOLVED | Noted: 2023-01-26 | Resolved: 2023-05-24

## 2023-05-24 PROBLEM — M54.6 BACK PAIN, THORACIC: Status: RESOLVED | Noted: 2023-01-26 | Resolved: 2023-05-24

## 2023-05-24 PROBLEM — M25.879 ANKLE IMPINGEMENT SYNDROME: Status: RESOLVED | Noted: 2023-01-26 | Resolved: 2023-05-24

## 2023-05-24 PROBLEM — H92.03 EARACHE SYMPTOMS IN BOTH EARS: Status: RESOLVED | Noted: 2023-01-26 | Resolved: 2023-05-24

## 2023-05-24 PROBLEM — M54.6 THORACIC BACK PAIN: Status: RESOLVED | Noted: 2023-01-26 | Resolved: 2023-05-24

## 2023-05-24 PROCEDURE — 80354 DRUG SCREENING FENTANYL: CPT

## 2023-05-24 PROCEDURE — 99214 OFFICE O/P EST MOD 30 MIN: CPT | Performed by: PHYSICIAN ASSISTANT

## 2023-05-24 PROCEDURE — 1036F TOBACCO NON-USER: CPT | Performed by: PHYSICIAN ASSISTANT

## 2023-05-24 PROCEDURE — 80365 DRUG SCREENING OXYCODONE: CPT

## 2023-05-24 PROCEDURE — 80358 DRUG SCREENING METHADONE: CPT

## 2023-05-24 PROCEDURE — 80368 SEDATIVE HYPNOTICS: CPT

## 2023-05-24 PROCEDURE — 80361 OPIATES 1 OR MORE: CPT

## 2023-05-24 PROCEDURE — 80307 DRUG TEST PRSMV CHEM ANLYZR: CPT

## 2023-05-24 PROCEDURE — 80373 DRUG SCREENING TRAMADOL: CPT

## 2023-05-24 PROCEDURE — 80346 BENZODIAZEPINES1-12: CPT

## 2023-05-24 RX ORDER — CLOTRIMAZOLE AND BETAMETHASONE DIPROPIONATE 10; .64 MG/G; MG/G
1 CREAM TOPICAL 2 TIMES DAILY
Qty: 45 G | Refills: 1 | Status: SHIPPED | OUTPATIENT
Start: 2023-05-24 | End: 2023-06-08

## 2023-05-24 RX ORDER — PREDNISONE 20 MG/1
40 TABLET ORAL DAILY
Qty: 10 TABLET | Refills: 0 | Status: SHIPPED | OUTPATIENT
Start: 2023-05-24 | End: 2023-05-29

## 2023-05-24 RX ORDER — PROMETHAZINE HYDROCHLORIDE 12.5 MG/1
25 TABLET ORAL EVERY 6 HOURS PRN
Qty: 90 TABLET | Refills: 1 | Status: SHIPPED | OUTPATIENT
Start: 2023-05-24 | End: 2023-07-18 | Stop reason: SDUPTHER

## 2023-05-24 RX ORDER — FLUCONAZOLE 150 MG/1
150 TABLET ORAL ONCE
Qty: 1 TABLET | Refills: 0 | Status: SHIPPED | OUTPATIENT
Start: 2023-05-24 | End: 2023-05-24

## 2023-05-24 ASSESSMENT — PATIENT HEALTH QUESTIONNAIRE - PHQ9
1. LITTLE INTEREST OR PLEASURE IN DOING THINGS: NOT AT ALL
2. FEELING DOWN, DEPRESSED OR HOPELESS: NOT AT ALL
SUM OF ALL RESPONSES TO PHQ9 QUESTIONS 1 AND 2: 0

## 2023-05-24 NOTE — PROGRESS NOTES
Subjective   Patient ID: Lorraine Trivedi is a 63 y.o. female who presents for Rash (Under arms. It is spreading. Itchy. Hurts when it is touched. Monday morning noticed. OTC is not working. Has not changed anything lotions etc. ).    HPI Lorraine Trivedi is a 63 y.o. year old female patient with presenting to clinic with concern for   Chief Complaint   Patient presents with    Rash     Under arms. It is spreading. Itchy. Hurts when it is touched. Monday morning noticed. OTC is not working. Has not changed anything lotions etc.        Rash bilat axilla, itchy and painful. Ongoing 2 days. Same suave deoderant. No new soap or detergent. NO rashes elsewhere    This irritation has caused flare of pain for her occipital neuralgia, requests steroids.    Patient Active Problem List   Diagnosis    Abnormal EKG    Occipital neuralgia    Cervical myofascial pain syndrome    Chronic radicular low back pain    Dyslipidemia    GERD (gastroesophageal reflux disease)    History of cold sores    Irritable bowel syndrome with diarrhea    Peroneal tendon tear    Pes cavus, congenital    Simple cyst of kidney    Vertigo    Overweight with body mass index (BMI) of 29 to 29.9 in adult    Nausea in adult       Past Medical History:   Diagnosis Date    Alkaline phosphatase elevation 01/26/2023    Ankle impingement syndrome 01/26/2023    Ankle pain 01/26/2023    Back pain, thoracic 01/26/2023    DDD (degenerative disc disease), cervical 01/26/2023    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    Radiculopathy, lumbar region 07/22/2015    Chronic radicular lumbar pain    Seasickness 01/26/2023    Thoracic back pain 01/26/2023    Thrush, oral 01/26/2023      Past Surgical History:   Procedure Laterality Date    EYE SURGERY  04/20/2015    Eye Surgery    OTHER SURGICAL HISTORY  12/14/2020    Neck surgery    OTHER SURGICAL HISTORY  12/14/2020    Foot surgery    OTHER SURGICAL HISTORY  12/18/2018    Ankle surgery    TUBAL LIGATION  04/20/2015    Tubal Ligation      Family  History   Problem Relation Name Age of Onset    Diabetes Mother      Cancer Father      Heart failure Other Grandmother     Heart failure Other grandparent       Social History     Tobacco Use    Smoking status: Former     Packs/day: 1.00     Years: 35.00     Pack years: 35.00     Types: Cigarettes     Quit date:      Years since quittin.3    Smokeless tobacco: Never   Vaping Use    Vaping status: Not on file   Substance Use Topics    Alcohol use: Never        Current Outpatient Medications:     clonazePAM (KlonoPIN) 0.5 mg tablet, Take 1 tablet (0.5 mg) by mouth once daily at bedtime. To help sleep with pain and anxiety, Disp: , Rfl:     esomeprazole (NexIUM) 40 mg DR capsule, TAKE 1 CAPSULE TWICE DAILY 30 MINUTES PRIOR TO BREAKFAST AND DINNER., Disp: , Rfl:     hyoscyamine (Anaspaz, Levsin) 0.125 mg tablet, Take 1 tablet (0.125 mg) by mouth. 3-4 times daily., Disp: , Rfl:     ibuprofen 800 mg tablet, Take 1 tablet (800 mg) by mouth every 8 hours if needed for mild pain (1 - 3), headaches or moderate pain (4 - 6)., Disp: , Rfl:     meclizine (Antivert) 12.5 mg tablet, Take 1 tablet (12.5 mg) by mouth every 8 hours if needed (For Vertigo)., Disp: 180 tablet, Rfl: 3    methocarbamol (Robaxin) 750 mg tablet, Take 1 tablet (750 mg) by mouth 3 times a day as needed for muscle spasms. PRN, Disp: 270 tablet, Rfl: 0    ondansetron (Zofran) 4 mg tablet, Take 1 tablet (4 mg) by mouth every 8 hours if needed for nausea or vomiting., Disp: , Rfl:     pravastatin (Pravachol) 10 mg tablet, Take 1 tablet (10 mg) by mouth once daily at bedtime., Disp: 30 tablet, Rfl: 5    pregabalin (Lyrica) 75 mg capsule, 1 capsule PO in the morning, 1 cap in the afternoon, 2 cap at bedtime. Do not start before May 16, 2023., Disp: 112 capsule, Rfl: 0    [START ON 2023] pregabalin (Lyrica) 75 mg capsule, 1 capsule PO in the morning, 1 cap in the afternoon, 2 cap at bedtime. Do not start before 2023., Disp: 112 capsule, Rfl:  "0    rifAXIMin (Xifaxan) 550 mg tablet, Take 1 tablet (550 mg) by mouth 3 times a day., Disp: , Rfl:     rizatriptan (Maxalt) 10 mg tablet, take 1 tablet by mouth at onset of headache, may repeat every 2 hours as needed. max of 3 tablets in 24 hours, Disp: 30 tablet, Rfl: 3    topiramate (Topamax) 100 mg tablet, Take 1 tablet (100 mg) by mouth once daily., Disp: , Rfl:     clotrimazole-betamethasone (Lotrisone) cream, Apply 1 Application topically 2 times a day for 15 days., Disp: 45 g, Rfl: 1    predniSONE (Deltasone) 20 mg tablet, Take 2 tablets (40 mg) by mouth once daily for 5 days., Disp: 10 tablet, Rfl: 0    pregabalin (Lyrica) 75 mg capsule, 1 capsule PO in the morning, 1 capsule in the afternoon, 2 capsules a bedtime, Disp: 112 capsule, Rfl: 0    promethazine (Phenergan) 12.5 mg tablet, Take 2 tablets (25 mg) by mouth every 6 hours if needed for nausea or vomiting., Disp: 90 tablet, Rfl: 1          Review of Systems  Review of Systems:   Constitutional: Denies fever  HEENT: Denies ST, earache  CVS: Denies Chest pain  Pulmonary: Denies wheezing, SOB  GI: Denies N/V  : Denies dysuria  Musculoskeletal:  Denies myalgia  Neuro: Denies focal weakness or numbness.  Skin: Denies Rashes.  *Review of Systems is negative unless otherwise mentioned in HPI or ROS above.      Objective   /60   Pulse 71   Temp 37 °C (98.6 °F)   Ht 1.778 m (5' 10\")   Wt 94.9 kg (209 lb 3.2 oz)   SpO2 98%   BMI 30.02 kg/m²     Physical Exam  Physical exam:  /60   Pulse 71   Temp 37 °C (98.6 °F)   Ht 1.778 m (5' 10\")   Wt 94.9 kg (209 lb 3.2 oz)   SpO2 98%   BMI 30.02 kg/m²  reviewed   Body mass index is 30.02 kg/m².     Constitutional: NAD.  Resting comfortably.  Head: Atraumatic, normocephalic.  EENT: deferred d/t masking  Cardiac: Regular rate & rhythm.   Pulmonary: Lungs clear bilat  GI: Soft, Nontender, nondistended.   Musculoskeletal: No peripheral edema.   Skin: No evidence of trauma. Mild erythema without " discharge or drainage bilat axillae  Psych: Intact judgement and insight.    Assessment/Plan   Problem List Items Addressed This Visit    None  Visit Diagnoses       Dermatitis    -  Primary    Relevant Medications    clotrimazole-betamethasone (Lotrisone) cream    predniSONE (Deltasone) 20 mg tablet    Medication care plan discussed with patient        Relevant Orders    Benzodiazepine Confirmation, Urine    Opiate/Opioid/Benzo Extended Prescription Compliance    Nausea        Relevant Medications    promethazine (Phenergan) 12.5 mg tablet             Likely allergy d/t deoderant. Recommended Dove.

## 2023-05-25 ENCOUNTER — OFFICE VISIT (OUTPATIENT)
Dept: PRIMARY CARE | Facility: CLINIC | Age: 63
End: 2023-05-25
Payer: COMMERCIAL

## 2023-05-25 VITALS
BODY MASS INDEX: 29.95 KG/M2 | HEIGHT: 70 IN | SYSTOLIC BLOOD PRESSURE: 138 MMHG | OXYGEN SATURATION: 95 % | TEMPERATURE: 98.6 F | WEIGHT: 209.2 LBS | HEART RATE: 78 BPM | DIASTOLIC BLOOD PRESSURE: 82 MMHG

## 2023-05-25 DIAGNOSIS — G43.009 MIGRAINE WITHOUT AURA AND WITHOUT STATUS MIGRAINOSUS, NOT INTRACTABLE: Primary | ICD-10-CM

## 2023-05-25 PROCEDURE — 1036F TOBACCO NON-USER: CPT | Performed by: PHYSICIAN ASSISTANT

## 2023-05-25 PROCEDURE — 99213 OFFICE O/P EST LOW 20 MIN: CPT | Performed by: PHYSICIAN ASSISTANT

## 2023-05-25 RX ORDER — RIZATRIPTAN BENZOATE 10 MG/1
10 TABLET, ORALLY DISINTEGRATING ORAL ONCE AS NEEDED
Qty: 30 TABLET | Refills: 3 | Status: SHIPPED | OUTPATIENT
Start: 2023-05-25 | End: 2023-07-17 | Stop reason: SDUPTHER

## 2023-05-25 NOTE — PROGRESS NOTES
Subjective   Patient ID: Lorraine Trivedi is a 63 y.o. female who presents for Follow-up (Would like to discuss some personal issues).    HPI Lorraine Trivedi is a 63 y.o. year old female patient with presenting to clinic with concern for   Chief Complaint   Patient presents with    Follow-up     Would like to discuss some personal issues       Migraines - maxalt isn't working well. Had ODT in the past, works better. Had botox shots for occipital neuralgia, but likely won't be effective for several weeks.     Patient Active Problem List   Diagnosis    Abnormal EKG    Occipital neuralgia    Cervical myofascial pain syndrome    Chronic radicular low back pain    Dyslipidemia    GERD (gastroesophageal reflux disease)    History of cold sores    Irritable bowel syndrome with diarrhea    Peroneal tendon tear    Pes cavus, congenital    Simple cyst of kidney    Vertigo    Overweight with body mass index (BMI) of 29 to 29.9 in adult    Nausea in adult       Past Medical History:   Diagnosis Date    Alkaline phosphatase elevation 01/26/2023    Ankle impingement syndrome 01/26/2023    Ankle pain 01/26/2023    Back pain, thoracic 01/26/2023    DDD (degenerative disc disease), cervical 01/26/2023    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    Radiculopathy, lumbar region 07/22/2015    Chronic radicular lumbar pain    Seasickness 01/26/2023    Thoracic back pain 01/26/2023    Thrush, oral 01/26/2023      Past Surgical History:   Procedure Laterality Date    EYE SURGERY  04/20/2015    Eye Surgery    OTHER SURGICAL HISTORY  12/14/2020    Neck surgery    OTHER SURGICAL HISTORY  12/14/2020    Foot surgery    OTHER SURGICAL HISTORY  12/18/2018    Ankle surgery    TUBAL LIGATION  04/20/2015    Tubal Ligation      Family History   Problem Relation Name Age of Onset    Diabetes Mother      Cancer Father      Heart failure Other Grandmother     Heart failure Other grandparent       Social History     Tobacco Use    Smoking status: Former     Packs/day: 1.00      Years: 35.00     Pack years: 35.00     Types: Cigarettes     Quit date:      Years since quittin.3    Smokeless tobacco: Never   Vaping Use    Vaping status: Not on file   Substance Use Topics    Alcohol use: Never        Current Outpatient Medications:     clonazePAM (KlonoPIN) 0.5 mg tablet, Take 1 tablet (0.5 mg) by mouth once daily at bedtime. To help sleep with pain and anxiety, Disp: , Rfl:     clotrimazole-betamethasone (Lotrisone) cream, Apply 1 Application topically 2 times a day for 15 days., Disp: 45 g, Rfl: 1    esomeprazole (NexIUM) 40 mg DR capsule, TAKE 1 CAPSULE TWICE DAILY 30 MINUTES PRIOR TO BREAKFAST AND DINNER., Disp: , Rfl:     hyoscyamine (Anaspaz, Levsin) 0.125 mg tablet, Take 1 tablet (0.125 mg) by mouth. 3-4 times daily., Disp: , Rfl:     ibuprofen 800 mg tablet, Take 1 tablet (800 mg) by mouth every 8 hours if needed for mild pain (1 - 3), headaches or moderate pain (4 - 6)., Disp: , Rfl:     meclizine (Antivert) 12.5 mg tablet, Take 1 tablet (12.5 mg) by mouth every 8 hours if needed (For Vertigo)., Disp: 180 tablet, Rfl: 3    methocarbamol (Robaxin) 750 mg tablet, Take 1 tablet (750 mg) by mouth 3 times a day as needed for muscle spasms. PRN, Disp: 270 tablet, Rfl: 0    ondansetron (Zofran) 4 mg tablet, Take 1 tablet (4 mg) by mouth every 8 hours if needed for nausea or vomiting., Disp: , Rfl:     pravastatin (Pravachol) 10 mg tablet, Take 1 tablet (10 mg) by mouth once daily at bedtime., Disp: 30 tablet, Rfl: 5    predniSONE (Deltasone) 20 mg tablet, Take 2 tablets (40 mg) by mouth once daily for 5 days., Disp: 10 tablet, Rfl: 0    pregabalin (Lyrica) 75 mg capsule, 1 capsule PO in the morning, 1 cap in the afternoon, 2 cap at bedtime. Do not start before May 16, 2023., Disp: 112 capsule, Rfl: 0    [START ON 2023] pregabalin (Lyrica) 75 mg capsule, 1 capsule PO in the morning, 1 cap in the afternoon, 2 cap at bedtime. Do not start before 2023., Disp: 112 capsule,  "Rfl: 0    promethazine (Phenergan) 12.5 mg tablet, Take 2 tablets (25 mg) by mouth every 6 hours if needed for nausea or vomiting., Disp: 90 tablet, Rfl: 1    rifAXIMin (Xifaxan) 550 mg tablet, Take 1 tablet (550 mg) by mouth 3 times a day., Disp: , Rfl:     rizatriptan (Maxalt) 10 mg tablet, take 1 tablet by mouth at onset of headache, may repeat every 2 hours as needed. max of 3 tablets in 24 hours, Disp: 30 tablet, Rfl: 3    topiramate (Topamax) 100 mg tablet, Take 1 tablet (100 mg) by mouth once daily., Disp: , Rfl:     pregabalin (Lyrica) 75 mg capsule, 1 capsule PO in the morning, 1 capsule in the afternoon, 2 capsules a bedtime, Disp: 112 capsule, Rfl: 0          Review of Systems  Review of Systems:   Constitutional: Denies fever  HEENT: Denies ST, earache  CVS: Denies Chest pain  Pulmonary: Denies wheezing, SOB  GI: Denies N/V  : Denies dysuria  Musculoskeletal:  Denies myalgia  Neuro: Denies focal weakness or numbness.  Skin: Denies Rashes.  *Review of Systems is negative unless otherwise mentioned in HPI or ROS above.    Objective   /82   Pulse 78   Temp 37 °C (98.6 °F)   Ht 1.778 m (5' 10\")   Wt 94.9 kg (209 lb 3.2 oz)   SpO2 95%   BMI 30.02 kg/m²     Physical Exam  Physical exam:  /82   Pulse 78   Temp 37 °C (98.6 °F)   Ht 1.778 m (5' 10\")   Wt 94.9 kg (209 lb 3.2 oz)   SpO2 95%   BMI 30.02 kg/m²  reviewed   Body mass index is 30.02 kg/m².     Constitutional: NAD.  Resting comfortably.  Head: Atraumatic, normocephalic.  EENT: deferred d/t masking  Cardiac: Regular rate & rhythm.   Pulmonary: Lungs clear bilat  GI: Soft, Nontender, nondistended.   Musculoskeletal: No peripheral edema.   Skin: No evidence of trauma. Improved axillary rash.   Psych: Intact judgement and insight.    Assessment/Plan   Problem List Items Addressed This Visit    None  Visit Diagnoses       Migraine without aura and without status migrainosus, not intractable    -  Primary    Relevant Medications    " rizatriptan MLT (Maxalt-MLT) 10 mg disintegrating tablet

## 2023-05-26 LAB
6-ACETYLMORPHINE: <25 NG/ML
7-AMINOCLONAZEPAM: 71 NG/ML
ALPHA-HYDROXYALPRAZOLAM: <25 NG/ML
ALPHA-HYDROXYMIDAZOLAM: <25 NG/ML
ALPRAZOLAM: <25 NG/ML
AMPHETAMINE (PRESENCE) IN URINE BY SCREEN METHOD: ABNORMAL
BARBITURATES PRESENCE IN URINE BY SCREEN METHOD: ABNORMAL
CANNABINOIDS IN URINE BY SCREEN METHOD: ABNORMAL
CHLORDIAZEPOXIDE: <25 NG/ML
CLONAZEPAM: <25 NG/ML
COCAINE (PRESENCE) IN URINE BY SCREEN METHOD: ABNORMAL
CODEINE: <50 NG/ML
CREATINE, URINE FOR DRUG: 10.2 MG/DL
DIAZEPAM: <25 NG/ML
DRUG SCREEN COMMENT URINE: ABNORMAL
EDDP: <25 NG/ML
FENTANYL CONFIRMATION, URINE: <2.5 NG/ML
HYDROCODONE: <25 NG/ML
HYDROMORPHONE: <25 NG/ML
LORAZEPAM: <25 NG/ML
METHADONE CONFIRMATION,URINE: <25 NG/ML
MIDAZOLAM: <25 NG/ML
MORPHINE URINE: <50 NG/ML
NORDIAZEPAM: <25 NG/ML
NORFENTANYL: <2.5 NG/ML
NORHYDROCODONE: <25 NG/ML
NOROXYCODONE: <25 NG/ML
O-DESMETHYLTRAMADOL: <50 NG/ML
OXAZEPAM: <25 NG/ML
OXYCODONE: <25 NG/ML
OXYMORPHONE: <25 NG/ML
PHENCYCLIDINE (PRESENCE) IN URINE BY SCREEN METHOD: ABNORMAL
SPECIFIC GRAVITY, URINE DRUG: 1
TEMAZEPAM: <25 NG/ML
TRAMADOL: <50 NG/ML
ZOLPIDEM METABOLITE (ZCA): <25 NG/ML
ZOLPIDEM: <25 NG/ML

## 2023-07-06 DIAGNOSIS — M72.2 PLANTAR FASCIITIS: Primary | ICD-10-CM

## 2023-07-17 NOTE — PROGRESS NOTES
Subjective     HPI   Lorraine Trivedi is a 63 y.o. year old female patient with presenting to clinic with concern for   Chief Complaint   Patient presents with    Follow-up       Refill of Lyrica. OARRS reviewed.  CSA signed and on file. Drug testing was completed at our previous visit with appropriate results.      Migraines - maxalt ODT is working really well for her.     Patient Active Problem List   Diagnosis    Abnormal EKG    Occipital neuralgia    Cervical myofascial pain syndrome    Chronic radicular low back pain    Dyslipidemia    GERD (gastroesophageal reflux disease)    History of cold sores    Irritable bowel syndrome with diarrhea    Peroneal tendon tear    Pes cavus, congenital    Simple cyst of kidney    Vertigo    Overweight with body mass index (BMI) of 29 to 29.9 in adult    Nausea in adult       Past Medical History:   Diagnosis Date    Alkaline phosphatase elevation 2023    Ankle impingement syndrome 2023    Ankle pain 2023    Back pain, thoracic 2023    DDD (degenerative disc disease), cervical 2023    Glossodynia 2023    Radiculopathy, lumbar region 2015    Chronic radicular lumbar pain    Seasickness 2023    Thoracic back pain 2023    Thrush, oral 2023      Past Surgical History:   Procedure Laterality Date    EYE SURGERY  2015    Eye Surgery    OTHER SURGICAL HISTORY  2020    Neck surgery    OTHER SURGICAL HISTORY  2020    Foot surgery    OTHER SURGICAL HISTORY  2018    Ankle surgery    TUBAL LIGATION  2015    Tubal Ligation      Family History   Problem Relation Name Age of Onset    Diabetes Mother      Cancer Father      Heart failure Other Grandmother     Heart failure Other grandparent       Social History     Tobacco Use    Smoking status: Former     Packs/day: 1.00     Years: 35.00     Total pack years: 35.00     Types: Cigarettes     Quit date:      Years since quittin.5    Smokeless tobacco:  Never   Substance Use Topics    Alcohol use: Never        Current Outpatient Medications:     esomeprazole (NexIUM) 40 mg DR capsule, TAKE 1 CAPSULE TWICE DAILY 30 MINUTES PRIOR TO BREAKFAST AND DINNER., Disp: , Rfl:     hyoscyamine (Anaspaz, Levsin) 0.125 mg tablet, Take 1 tablet (0.125 mg) by mouth. 3-4 times daily., Disp: , Rfl:     meclizine (Antivert) 12.5 mg tablet, Take 1 tablet (12.5 mg) by mouth every 8 hours if needed (For Vertigo)., Disp: 180 tablet, Rfl: 3    ondansetron (Zofran) 4 mg tablet, Take 1 tablet (4 mg) by mouth every 8 hours if needed for nausea or vomiting., Disp: , Rfl:     pravastatin (Pravachol) 10 mg tablet, Take 1 tablet (10 mg) by mouth once daily at bedtime., Disp: 30 tablet, Rfl: 5    rifAXIMin (Xifaxan) 550 mg tablet, Take 1 tablet (550 mg) by mouth 3 times a day., Disp: , Rfl:     topiramate (Topamax) 100 mg tablet, Take 1 tablet (100 mg) by mouth once daily., Disp: , Rfl:     clonazePAM (KlonoPIN) 0.5 mg tablet, Take 1 tablet (0.5 mg) by mouth once daily at bedtime. To help sleep with pain and anxiety, Disp: 30 tablet, Rfl: 2    ibuprofen 800 mg tablet, Take 1 tablet (800 mg) by mouth every 8 hours if needed for mild pain (1 - 3), headaches or moderate pain (4 - 6)., Disp: 270 tablet, Rfl: 0    methocarbamol (Robaxin) 750 mg tablet, Take 1 tablet (750 mg) by mouth 3 times a day as needed for muscle spasms. PRN, Disp: 270 tablet, Rfl: 0    pregabalin (Lyrica) 75 mg capsule, 1 capsule PO in the morning, 1 cap in the afternoon, 2 cap at bedtime., Disp: 112 capsule, Rfl: 0    [START ON 8/17/2023] pregabalin (Lyrica) 75 mg capsule, 1 capsule PO in the morning, 1 cap in the afternoon, 2 cap at bedtime. Do not start before August 17, 2023., Disp: 112 capsule, Rfl: 0    [START ON 9/16/2023] pregabalin (Lyrica) 75 mg capsule, 1 capsule PO in the morning, 1 capsule in the afternoon, 2 capsules a bedtime Do not start before September 16, 2023., Disp: 112 capsule, Rfl: 0    promethazine  "(Phenergan) 12.5 mg tablet, Take 2 tablets (25 mg) by mouth every 8 hours if needed for nausea or vomiting., Disp: 90 tablet, Rfl: 1    rizatriptan MLT (Maxalt-MLT) 10 mg disintegrating tablet, Take 1 tablet (10 mg) by mouth 1 time if needed for migraine. May repeat in 2 hours if unresolved. Do not exceed 30 mg in 24 hours., Disp: 20 tablet, Rfl: 2     Review of Systems  Review of Systems:   Constitutional: Denies fever  HEENT: Denies ST, earache  CVS: Denies Chest pain  Pulmonary: Denies wheezing, SOB  GI: Denies N/V  : Denies dysuria  Musculoskeletal:  Denies myalgia  Neuro: Denies focal weakness or numbness.  Skin: Denies Rashes.  *Review of Systems is negative unless otherwise mentioned in HPI or ROS above.    Objective   /62   Pulse 69   Temp 37 °C (98.6 °F)   Ht 1.778 m (5' 10\")   Wt 92.7 kg (204 lb 6.4 oz)   SpO2 99%   BMI 29.33 kg/m²     Physical Exam  Physical exam:  /62   Pulse 69   Temp 37 °C (98.6 °F)   Ht 1.778 m (5' 10\")   Wt 92.7 kg (204 lb 6.4 oz)   SpO2 99%   BMI 29.33 kg/m²  reviewed   Body mass index is 29.33 kg/m².   Constitutional: NAD.  Resting comfortably.  Head: Atraumatic, normocephalic.  EENT: deferred d/t masking  Cardiac: Regular rate & rhythm.   Pulmonary: Lungs clear bilat  GI: Soft, Nontender, nondistended.   Musculoskeletal: No peripheral edema.   Skin: No evidence of trauma. No rashes  Psych: Intact judgement and insight.    .Assessment/Plan   Problem List Items Addressed This Visit       Occipital neuralgia    Relevant Medications    pregabalin (Lyrica) 75 mg capsule    pregabalin (Lyrica) 75 mg capsule (Start on 8/17/2023)    pregabalin (Lyrica) 75 mg capsule (Start on 9/16/2023)    clonazePAM (KlonoPIN) 0.5 mg tablet    Dyslipidemia    Vertigo     Other Visit Diagnoses       Cervical disc disorder with radiculopathy        Relevant Medications    pregabalin (Lyrica) 75 mg capsule    pregabalin (Lyrica) 75 mg capsule (Start on 8/17/2023)    pregabalin " (Lyrica) 75 mg capsule (Start on 9/16/2023)    Migraine without aura and without status migrainosus, not intractable        Relevant Medications    rizatriptan MLT (Maxalt-MLT) 10 mg disintegrating tablet    Headache, cervicogenic        Relevant Medications    methocarbamol (Robaxin) 750 mg tablet    ibuprofen 800 mg tablet    Nausea        Relevant Medications    promethazine (Phenergan) 12.5 mg tablet

## 2023-07-18 ENCOUNTER — OFFICE VISIT (OUTPATIENT)
Dept: PRIMARY CARE | Facility: CLINIC | Age: 63
End: 2023-07-18
Payer: COMMERCIAL

## 2023-07-18 VITALS
HEART RATE: 69 BPM | SYSTOLIC BLOOD PRESSURE: 124 MMHG | OXYGEN SATURATION: 99 % | TEMPERATURE: 98.6 F | BODY MASS INDEX: 29.26 KG/M2 | WEIGHT: 204.4 LBS | DIASTOLIC BLOOD PRESSURE: 62 MMHG | HEIGHT: 70 IN

## 2023-07-18 DIAGNOSIS — M54.81 BILATERAL OCCIPITAL NEURALGIA: ICD-10-CM

## 2023-07-18 DIAGNOSIS — M50.10 CERVICAL DISC DISORDER WITH RADICULOPATHY: ICD-10-CM

## 2023-07-18 DIAGNOSIS — E78.5 DYSLIPIDEMIA: ICD-10-CM

## 2023-07-18 DIAGNOSIS — R11.0 NAUSEA: ICD-10-CM

## 2023-07-18 DIAGNOSIS — G43.009 MIGRAINE WITHOUT AURA AND WITHOUT STATUS MIGRAINOSUS, NOT INTRACTABLE: ICD-10-CM

## 2023-07-18 DIAGNOSIS — G44.86 HEADACHE, CERVICOGENIC: ICD-10-CM

## 2023-07-18 DIAGNOSIS — R42 VERTIGO: ICD-10-CM

## 2023-07-18 PROCEDURE — 99214 OFFICE O/P EST MOD 30 MIN: CPT | Performed by: PHYSICIAN ASSISTANT

## 2023-07-18 PROCEDURE — 1036F TOBACCO NON-USER: CPT | Performed by: PHYSICIAN ASSISTANT

## 2023-07-18 RX ORDER — PREGABALIN 75 MG/1
CAPSULE ORAL
Qty: 112 CAPSULE | Refills: 0 | Status: SHIPPED | OUTPATIENT
Start: 2023-09-16 | End: 2023-10-19 | Stop reason: SDUPTHER

## 2023-07-18 RX ORDER — PROMETHAZINE HYDROCHLORIDE 12.5 MG/1
25 TABLET ORAL EVERY 8 HOURS PRN
Qty: 90 TABLET | Refills: 1 | Status: SHIPPED | OUTPATIENT
Start: 2023-07-18 | End: 2023-10-19 | Stop reason: SDUPTHER

## 2023-07-18 RX ORDER — PREGABALIN 75 MG/1
CAPSULE ORAL
Qty: 112 CAPSULE | Refills: 0 | Status: SHIPPED | OUTPATIENT
Start: 2023-07-18 | End: 2023-10-19 | Stop reason: WASHOUT

## 2023-07-18 RX ORDER — CLONAZEPAM 0.5 MG/1
0.5 TABLET ORAL NIGHTLY
Qty: 30 TABLET | Refills: 2 | Status: SHIPPED | OUTPATIENT
Start: 2023-07-18 | End: 2023-10-19 | Stop reason: SDUPTHER

## 2023-07-18 RX ORDER — PREGABALIN 75 MG/1
CAPSULE ORAL
Qty: 112 CAPSULE | Refills: 0 | Status: SHIPPED | OUTPATIENT
Start: 2023-08-17 | End: 2023-10-19 | Stop reason: WASHOUT

## 2023-07-18 RX ORDER — RIZATRIPTAN BENZOATE 10 MG/1
10 TABLET, ORALLY DISINTEGRATING ORAL ONCE AS NEEDED
Qty: 20 TABLET | Refills: 2 | Status: SHIPPED | OUTPATIENT
Start: 2023-07-18 | End: 2024-01-16 | Stop reason: SDUPTHER

## 2023-07-18 RX ORDER — IBUPROFEN 800 MG/1
800 TABLET ORAL EVERY 8 HOURS PRN
Qty: 270 TABLET | Refills: 0 | Status: SHIPPED | OUTPATIENT
Start: 2023-07-18 | End: 2023-10-19 | Stop reason: SDUPTHER

## 2023-07-18 RX ORDER — METHOCARBAMOL 750 MG/1
750 TABLET, FILM COATED ORAL 3 TIMES DAILY PRN
Qty: 270 TABLET | Refills: 0 | Status: SHIPPED | OUTPATIENT
Start: 2023-07-18 | End: 2023-10-19 | Stop reason: SDUPTHER

## 2023-07-18 ASSESSMENT — PATIENT HEALTH QUESTIONNAIRE - PHQ9
SUM OF ALL RESPONSES TO PHQ9 QUESTIONS 1 AND 2: 0
1. LITTLE INTEREST OR PLEASURE IN DOING THINGS: NOT AT ALL
2. FEELING DOWN, DEPRESSED OR HOPELESS: NOT AT ALL

## 2023-09-06 ENCOUNTER — APPOINTMENT (OUTPATIENT)
Dept: PRIMARY CARE | Facility: CLINIC | Age: 63
End: 2023-09-06
Payer: COMMERCIAL

## 2023-10-12 DIAGNOSIS — E78.5 DYSLIPIDEMIA: ICD-10-CM

## 2023-10-12 RX ORDER — PRAVASTATIN SODIUM 10 MG/1
10 TABLET ORAL NIGHTLY
Qty: 90 TABLET | Refills: 1 | Status: SHIPPED | OUTPATIENT
Start: 2023-10-12 | End: 2023-10-19 | Stop reason: SDUPTHER

## 2023-10-19 ENCOUNTER — OFFICE VISIT (OUTPATIENT)
Dept: PRIMARY CARE | Facility: CLINIC | Age: 63
End: 2023-10-19
Payer: COMMERCIAL

## 2023-10-19 VITALS
BODY MASS INDEX: 30.09 KG/M2 | DIASTOLIC BLOOD PRESSURE: 80 MMHG | TEMPERATURE: 98.6 F | SYSTOLIC BLOOD PRESSURE: 122 MMHG | HEART RATE: 73 BPM | OXYGEN SATURATION: 97 % | HEIGHT: 70 IN | WEIGHT: 210.2 LBS

## 2023-10-19 DIAGNOSIS — Z00.00 HEALTHCARE MAINTENANCE: ICD-10-CM

## 2023-10-19 DIAGNOSIS — M50.10 CERVICAL DISC DISORDER WITH RADICULOPATHY: Primary | ICD-10-CM

## 2023-10-19 DIAGNOSIS — R11.0 NAUSEA: ICD-10-CM

## 2023-10-19 DIAGNOSIS — M54.81 BILATERAL OCCIPITAL NEURALGIA: ICD-10-CM

## 2023-10-19 DIAGNOSIS — G43.009 MIGRAINE WITHOUT AURA AND WITHOUT STATUS MIGRAINOSUS, NOT INTRACTABLE: ICD-10-CM

## 2023-10-19 DIAGNOSIS — E61.2 MAGNESIUM DEFICIENCY: ICD-10-CM

## 2023-10-19 DIAGNOSIS — Z12.31 SCREENING MAMMOGRAM FOR BREAST CANCER: ICD-10-CM

## 2023-10-19 DIAGNOSIS — R42 VERTIGO: ICD-10-CM

## 2023-10-19 DIAGNOSIS — E55.9 VITAMIN D INSUFFICIENCY: ICD-10-CM

## 2023-10-19 DIAGNOSIS — G44.86 HEADACHE, CERVICOGENIC: ICD-10-CM

## 2023-10-19 DIAGNOSIS — E78.5 DYSLIPIDEMIA: ICD-10-CM

## 2023-10-19 DIAGNOSIS — E78.5 BORDERLINE HYPERLIPIDEMIA: ICD-10-CM

## 2023-10-19 PROCEDURE — 1036F TOBACCO NON-USER: CPT | Performed by: PHYSICIAN ASSISTANT

## 2023-10-19 PROCEDURE — 99214 OFFICE O/P EST MOD 30 MIN: CPT | Performed by: PHYSICIAN ASSISTANT

## 2023-10-19 RX ORDER — METHYLPREDNISOLONE 4 MG/1
TABLET ORAL
Qty: 21 TABLET | Refills: 0 | Status: SHIPPED | OUTPATIENT
Start: 2023-10-19 | End: 2023-10-26

## 2023-10-19 RX ORDER — TOPIRAMATE 100 MG/1
100 TABLET, FILM COATED ORAL DAILY
Qty: 90 TABLET | Refills: 1 | Status: SHIPPED | OUTPATIENT
Start: 2023-10-19 | End: 2024-01-16 | Stop reason: SDUPTHER

## 2023-10-19 RX ORDER — PROMETHAZINE HYDROCHLORIDE 25 MG/1
25 TABLET ORAL EVERY 8 HOURS PRN
Qty: 90 TABLET | Refills: 1 | Status: SHIPPED | OUTPATIENT
Start: 2023-10-19 | End: 2024-01-16 | Stop reason: SDUPTHER

## 2023-10-19 RX ORDER — METHOCARBAMOL 750 MG/1
750 TABLET, FILM COATED ORAL 3 TIMES DAILY PRN
Qty: 270 TABLET | Refills: 1 | Status: SHIPPED | OUTPATIENT
Start: 2023-10-19 | End: 2024-04-16 | Stop reason: SDUPTHER

## 2023-10-19 RX ORDER — PREGABALIN 75 MG/1
CAPSULE ORAL
Qty: 112 CAPSULE | Refills: 2 | Status: SHIPPED | OUTPATIENT
Start: 2023-10-19 | End: 2024-01-16 | Stop reason: SDUPTHER

## 2023-10-19 RX ORDER — CLONAZEPAM 0.5 MG/1
0.5 TABLET ORAL NIGHTLY
Qty: 30 TABLET | Refills: 2 | Status: SHIPPED | OUTPATIENT
Start: 2023-10-19 | End: 2024-01-16 | Stop reason: SDUPTHER

## 2023-10-19 RX ORDER — MECLIZINE HCL 12.5 MG 12.5 MG/1
12.5 TABLET ORAL EVERY 8 HOURS PRN
Qty: 180 TABLET | Refills: 3 | Status: SHIPPED | OUTPATIENT
Start: 2023-10-19 | End: 2024-04-16 | Stop reason: SDUPTHER

## 2023-10-19 RX ORDER — PRAVASTATIN SODIUM 10 MG/1
10 TABLET ORAL NIGHTLY
Qty: 90 TABLET | Refills: 1 | Status: SHIPPED | OUTPATIENT
Start: 2023-10-19 | End: 2024-01-16 | Stop reason: SDUPTHER

## 2023-10-19 RX ORDER — IBUPROFEN 800 MG/1
800 TABLET ORAL EVERY 8 HOURS PRN
Qty: 270 TABLET | Refills: 0 | Status: SHIPPED | OUTPATIENT
Start: 2023-10-19 | End: 2023-12-31

## 2023-10-19 NOTE — PROGRESS NOTES
Subjective     HPI   Lorraine Trivedi is a 63 y.o. year old female patient with presenting to clinic with concern for   Chief Complaint   Patient presents with    Follow-up     3 mth    Migraine     Steroid pack for neck pain and migraines        Maxalt isn't working well. Had ODT in the past, works better. Had botox shots for occipital neuralgia, but likely won't be effective for several weeks.     Due for physical  Due for labs Dec 3  Never had a mammogram    Has ongoing headache/neck pain flare, requesting steroid today    Patient Active Problem List   Diagnosis    Abnormal EKG    Occipital neuralgia    Cervical myofascial pain syndrome    Chronic radicular low back pain    Dyslipidemia    GERD (gastroesophageal reflux disease)    History of cold sores    Irritable bowel syndrome with diarrhea    Peroneal tendon tear    Pes cavus, congenital    Simple cyst of kidney    Vertigo    Overweight with body mass index (BMI) of 29 to 29.9 in adult    Nausea in adult       Past Medical History:   Diagnosis Date    Alkaline phosphatase elevation 01/26/2023    Ankle impingement syndrome 01/26/2023    Ankle pain 01/26/2023    Back pain, thoracic 01/26/2023    DDD (degenerative disc disease), cervical 01/26/2023    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    Radiculopathy, lumbar region 07/22/2015    Chronic radicular lumbar pain    Seasickness 01/26/2023    Thoracic back pain 01/26/2023    Thrush, oral 01/26/2023      Past Surgical History:   Procedure Laterality Date    EYE SURGERY  04/20/2015    Eye Surgery    OTHER SURGICAL HISTORY  12/14/2020    Neck surgery    OTHER SURGICAL HISTORY  12/14/2020    Foot surgery    OTHER SURGICAL HISTORY  12/18/2018    Ankle surgery    TUBAL LIGATION  04/20/2015    Tubal Ligation      Family History   Problem Relation Name Age of Onset    Diabetes Mother      Cancer Father      Heart failure Other Grandmother     Heart failure Other grandparent       Social History     Tobacco Use    Smoking status: Former      Packs/day: 1.00     Years: 35.00     Additional pack years: 0.00     Total pack years: 35.00     Types: Cigarettes     Quit date: 2017     Years since quittin.8    Smokeless tobacco: Never   Substance Use Topics    Alcohol use: Never        Current Outpatient Medications:     esomeprazole (NexIUM) 40 mg DR capsule, TAKE 1 CAPSULE TWICE DAILY 30 MINUTES PRIOR TO BREAKFAST AND DINNER., Disp: , Rfl:     hyoscyamine (Anaspaz, Levsin) 0.125 mg tablet, Take 1 tablet (0.125 mg) by mouth. 3-4 times daily., Disp: , Rfl:     ondansetron (Zofran) 4 mg tablet, Take 1 tablet (4 mg) by mouth every 8 hours if needed for nausea or vomiting., Disp: , Rfl:     rifAXIMin (Xifaxan) 550 mg tablet, Take 1 tablet (550 mg) by mouth 3 times a day., Disp: , Rfl:     rizatriptan MLT (Maxalt-MLT) 10 mg disintegrating tablet, Take 1 tablet (10 mg) by mouth 1 time if needed for migraine. May repeat in 2 hours if unresolved. Do not exceed 30 mg in 24 hours., Disp: 20 tablet, Rfl: 2    clonazePAM (KlonoPIN) 0.5 mg tablet, Take 1 tablet (0.5 mg) by mouth once daily at bedtime. To help sleep with pain and anxiety, Disp: 30 tablet, Rfl: 2    ibuprofen 800 mg tablet, Take 1 tablet (800 mg) by mouth every 8 hours if needed for mild pain (1 - 3), headaches or moderate pain (4 - 6)., Disp: 270 tablet, Rfl: 0    meclizine (Antivert) 12.5 mg tablet, Take 1 tablet (12.5 mg) by mouth every 8 hours if needed (For Vertigo)., Disp: 180 tablet, Rfl: 3    methocarbamol (Robaxin) 750 mg tablet, Take 1 tablet (750 mg) by mouth 3 times a day as needed for muscle spasms. PRN, Disp: 270 tablet, Rfl: 1    methylPREDNISolone (Medrol Dospak) 4 mg tablets, Take as directed on package., Disp: 21 tablet, Rfl: 0    pravastatin (Pravachol) 10 mg tablet, Take 1 tablet (10 mg) by mouth once daily at bedtime., Disp: 90 tablet, Rfl: 1    pregabalin (Lyrica) 75 mg capsule, 1 capsule PO in the morning, 1 capsule in the afternoon, 2 capsules a bedtime, Disp: 112 capsule, Rfl:  "2    promethazine (Phenergan) 25 mg tablet, Take 1 tablet (25 mg) by mouth every 8 hours if needed for nausea or vomiting., Disp: 90 tablet, Rfl: 1    topiramate (Topamax) 100 mg tablet, Take 1 tablet (100 mg) by mouth once daily., Disp: 90 tablet, Rfl: 1     Review of Systems  Constitutional: Denies fever  HEENT: Denies ST, earache  CVS: Denies Chest pain  Pulmonary: Denies wheezing, SOB  GI: Denies N/V  : Denies dysuria  Musculoskeletal:  Denies myalgia  Neuro: Denies focal weakness or numbness.  Skin: Denies Rashes.  *Review of Systems is negative unless otherwise mentioned in HPI or ROS above.    Objective   /80   Pulse 73   Temp 37 °C (98.6 °F)   Ht 1.778 m (5' 10\")   Wt 95.3 kg (210 lb 3.2 oz)   SpO2 97%   BMI 30.16 kg/m²  reviewed Body mass index is 30.16 kg/m².     Physical Exam  Constitutional: NAD.  Resting comfortably.  Head: Atraumatic, normocephalic.  ENT: Moist oral mucosa. Nasal mucosa wnl.   Cardiac: Regular rate & rhythm.   Pulmonary: Lungs clear bilat  GI: Soft, Nontender, nondistended.   Musculoskeletal: No peripheral edema.   Skin: No evidence of trauma. No rashes  Psych: Intact judgement and insight.    .Assessment/Plan   Problem List Items Addressed This Visit       Occipital neuralgia    Dyslipidemia    Vertigo    Migraine without aura and without status migrainosus, not intractable     Other Visit Diagnoses       Cervical disc disorder with radiculopathy    -  Primary    Nausea        Headache, cervicogenic        Borderline hyperlipidemia        Relevant Orders    CBC (Completed)    Comprehensive Metabolic Panel (Completed)    TSH with reflex to Free T4 if abnormal (Completed)    Lipid Panel (Completed)    Vitamin D insufficiency        Relevant Orders    Vitamin D 25-Hydroxy,Total (for eval of Vitamin D levels) (Completed)    Magnesium deficiency        Relevant Orders    Magnesium (Completed)    Screening mammogram for breast cancer        Relevant Orders    BI mammo " bilateral screening tomosynthesis (Completed)

## 2023-10-19 NOTE — PATIENT INSTRUCTIONS
Please have your blood work drawn (fasting) December 3rd.     Please schedule to have your mammogram.

## 2023-10-20 DIAGNOSIS — R11.0 NAUSEA IN ADULT: ICD-10-CM

## 2023-10-20 RX ORDER — ONDANSETRON 4 MG/1
4 TABLET, FILM COATED ORAL EVERY 8 HOURS PRN
Qty: 20 TABLET | Refills: 1 | Status: SHIPPED | OUTPATIENT
Start: 2023-10-20 | End: 2023-10-23 | Stop reason: ENTERED-IN-ERROR

## 2023-10-23 DIAGNOSIS — R11.0 NAUSEA: Primary | ICD-10-CM

## 2023-10-23 RX ORDER — ONDANSETRON 4 MG/1
4 TABLET, ORALLY DISINTEGRATING ORAL EVERY 12 HOURS PRN
Qty: 20 TABLET | Refills: 3 | Status: SHIPPED | OUTPATIENT
Start: 2023-10-23 | End: 2024-01-16 | Stop reason: SDUPTHER

## 2023-11-17 ENCOUNTER — OFFICE VISIT (OUTPATIENT)
Dept: PRIMARY CARE | Facility: CLINIC | Age: 63
End: 2023-11-17
Payer: COMMERCIAL

## 2023-11-17 VITALS
DIASTOLIC BLOOD PRESSURE: 68 MMHG | WEIGHT: 209.2 LBS | TEMPERATURE: 98.6 F | HEIGHT: 70 IN | OXYGEN SATURATION: 99 % | SYSTOLIC BLOOD PRESSURE: 118 MMHG | BODY MASS INDEX: 29.95 KG/M2 | HEART RATE: 51 BPM

## 2023-11-17 DIAGNOSIS — M25.462 KNEE EFFUSION, LEFT: Primary | ICD-10-CM

## 2023-11-17 DIAGNOSIS — H65.02 ACUTE SEROUS OTITIS MEDIA OF LEFT EAR, RECURRENCE NOT SPECIFIED: ICD-10-CM

## 2023-11-17 PROCEDURE — 99214 OFFICE O/P EST MOD 30 MIN: CPT | Performed by: PHYSICIAN ASSISTANT

## 2023-11-17 PROCEDURE — 1036F TOBACCO NON-USER: CPT | Performed by: PHYSICIAN ASSISTANT

## 2023-11-17 RX ORDER — PREDNISONE 20 MG/1
TABLET ORAL
Qty: 18 TABLET | Refills: 0 | Status: SHIPPED | OUTPATIENT
Start: 2023-11-17 | End: 2024-03-06 | Stop reason: WASHOUT

## 2023-11-17 NOTE — PROGRESS NOTES
Subjective     HPI   Lorraine Trivedi is a 63 y.o. year old female patient with presenting to clinic with concern for   Chief Complaint   Patient presents with    Tinnitus     Went to urgent last Friday. Said she had fluid behind her ears was told to take Flonase and Claritin.     Knee Pain     Left. Been hurting for 2 wks.        Left knee pain x 2 weeks NKI, has been swelling and pain going up and down stairs and biking.     Tinnitis has improved after going to urgent care. Improving with flonase but not resolved. Continue.     Has been in PT recently for plantar fasciitis    Patient Active Problem List   Diagnosis    Abnormal EKG    Occipital neuralgia    Cervical myofascial pain syndrome    Chronic radicular low back pain    Dyslipidemia    GERD (gastroesophageal reflux disease)    History of cold sores    Irritable bowel syndrome with diarrhea    Peroneal tendon tear    Pes cavus, congenital    Simple cyst of kidney    Vertigo    Overweight with body mass index (BMI) of 29 to 29.9 in adult    Nausea in adult    Migraine without aura and without status migrainosus, not intractable       Past Medical History:   Diagnosis Date    Alkaline phosphatase elevation 01/26/2023    Ankle impingement syndrome 01/26/2023    Ankle pain 01/26/2023    Back pain, thoracic 01/26/2023    DDD (degenerative disc disease), cervical 01/26/2023    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    Radiculopathy, lumbar region 07/22/2015    Chronic radicular lumbar pain    Seasickness 01/26/2023    Thoracic back pain 01/26/2023    Thrush, oral 01/26/2023      Past Surgical History:   Procedure Laterality Date    EYE SURGERY  04/20/2015    Eye Surgery    OTHER SURGICAL HISTORY  12/14/2020    Neck surgery    OTHER SURGICAL HISTORY  12/14/2020    Foot surgery    OTHER SURGICAL HISTORY  12/18/2018    Ankle surgery    TUBAL LIGATION  04/20/2015    Tubal Ligation      Family History   Problem Relation Name Age of Onset    Diabetes Mother      Cancer Father      Heart  failure Other Grandmother     Heart failure Other grandparent       Social History     Tobacco Use    Smoking status: Former     Packs/day: 1.00     Years: 35.00     Additional pack years: 0.00     Total pack years: 35.00     Types: Cigarettes     Quit date:      Years since quittin.8    Smokeless tobacco: Never   Substance Use Topics    Alcohol use: Never        Current Outpatient Medications:     clonazePAM (KlonoPIN) 0.5 mg tablet, Take 1 tablet (0.5 mg) by mouth once daily at bedtime. To help sleep with pain and anxiety, Disp: 30 tablet, Rfl: 2    esomeprazole (NexIUM) 40 mg DR capsule, TAKE 1 CAPSULE TWICE DAILY 30 MINUTES PRIOR TO BREAKFAST AND DINNER., Disp: , Rfl:     hyoscyamine (Anaspaz, Levsin) 0.125 mg tablet, Take 1 tablet (0.125 mg) by mouth. 3-4 times daily., Disp: , Rfl:     ibuprofen 800 mg tablet, Take 1 tablet (800 mg) by mouth every 8 hours if needed for mild pain (1 - 3), headaches or moderate pain (4 - 6)., Disp: 270 tablet, Rfl: 0    meclizine (Antivert) 12.5 mg tablet, Take 1 tablet (12.5 mg) by mouth every 8 hours if needed (For Vertigo)., Disp: 180 tablet, Rfl: 3    methocarbamol (Robaxin) 750 mg tablet, Take 1 tablet (750 mg) by mouth 3 times a day as needed for muscle spasms. PRN, Disp: 270 tablet, Rfl: 1    ondansetron ODT (Zofran-ODT) 4 mg disintegrating tablet, Take 1 tablet (4 mg) by mouth every 12 hours if needed for nausea or vomiting., Disp: 20 tablet, Rfl: 3    pravastatin (Pravachol) 10 mg tablet, Take 1 tablet (10 mg) by mouth once daily at bedtime., Disp: 90 tablet, Rfl: 1    pregabalin (Lyrica) 75 mg capsule, 1 capsule PO in the morning, 1 capsule in the afternoon, 2 capsules a bedtime, Disp: 112 capsule, Rfl: 2    promethazine (Phenergan) 25 mg tablet, Take 1 tablet (25 mg) by mouth every 8 hours if needed for nausea or vomiting., Disp: 90 tablet, Rfl: 1    rifAXIMin (Xifaxan) 550 mg tablet, Take 1 tablet (550 mg) by mouth 3 times a day., Disp: , Rfl:     rizatriptan  "MLT (Maxalt-MLT) 10 mg disintegrating tablet, Take 1 tablet (10 mg) by mouth 1 time if needed for migraine. May repeat in 2 hours if unresolved. Do not exceed 30 mg in 24 hours., Disp: 20 tablet, Rfl: 2    topiramate (Topamax) 100 mg tablet, Take 1 tablet (100 mg) by mouth once daily., Disp: 90 tablet, Rfl: 1    predniSONE (Deltasone) 20 mg tablet, Take 3 tabs (60mg) daily for 3 days, then take 2 tabs (40mg) daily for 3 days, then take 1 tab (20mg) daily for 3 days., Disp: 18 tablet, Rfl: 0     Review of Systems  Constitutional: Denies fever  HEENT: Denies ST, earache  CVS: Denies Chest pain  Pulmonary: Denies wheezing, SOB  GI: Denies N/V  : Denies dysuria  Musculoskeletal:  Denies myalgia  Neuro: Denies focal weakness or numbness.  Skin: Denies Rashes.  *Review of Systems is negative unless otherwise mentioned in HPI or ROS above.    Objective   /68   Pulse 51   Temp 37 °C (98.6 °F)   Ht 1.778 m (5' 10\")   Wt 94.9 kg (209 lb 3.2 oz)   SpO2 99%   BMI 30.02 kg/m²  reviewed Body mass index is 30.02 kg/m².     Physical Exam  Constitutional: NAD.  Resting comfortably.  Head: Atraumatic, normocephalic.  ENT: Moist oral mucosa. Nasal mucosa wnl. R TM wnl. L TM mildly buldging without erythema.   Cardiac: Regular rate & rhythm.   Pulmonary: Lungs clear bilat  GI: Soft, Nontender, nondistended.   Musculoskeletal: No peripheral edema. Left knee mild effusion, pain with weight bearing, no laxity on drawer/strain tests.  Skin: No evidence of trauma. No rashes  Psych: Intact judgement and insight.    .Assessment/Plan   Problem List Items Addressed This Visit    None  Visit Diagnoses         Codes    Knee effusion, left    -  Primary M25.462    Relevant Medications    predniSONE (Deltasone) 20 mg tablet    Acute serous otitis media of left ear, recurrence not specified     H65.02            "

## 2023-12-05 ENCOUNTER — HOSPITAL ENCOUNTER (OUTPATIENT)
Dept: RADIOLOGY | Facility: HOSPITAL | Age: 63
Discharge: HOME | End: 2023-12-05
Payer: COMMERCIAL

## 2023-12-05 DIAGNOSIS — Z12.31 SCREENING MAMMOGRAM FOR BREAST CANCER: ICD-10-CM

## 2023-12-05 PROCEDURE — 77063 BREAST TOMOSYNTHESIS BI: CPT | Performed by: RADIOLOGY

## 2023-12-05 PROCEDURE — 77067 SCR MAMMO BI INCL CAD: CPT | Performed by: RADIOLOGY

## 2023-12-05 PROCEDURE — 77067 SCR MAMMO BI INCL CAD: CPT

## 2023-12-29 DIAGNOSIS — R11.0 NAUSEA IN ADULT: ICD-10-CM

## 2023-12-29 DIAGNOSIS — K21.9 GASTROESOPHAGEAL REFLUX DISEASE, UNSPECIFIED WHETHER ESOPHAGITIS PRESENT: ICD-10-CM

## 2023-12-30 DIAGNOSIS — G44.86 HEADACHE, CERVICOGENIC: ICD-10-CM

## 2023-12-31 RX ORDER — IBUPROFEN 800 MG/1
800 TABLET ORAL EVERY 8 HOURS PRN
Qty: 90 TABLET | Refills: 0 | Status: SHIPPED | OUTPATIENT
Start: 2023-12-31 | End: 2024-01-16 | Stop reason: SDUPTHER

## 2024-01-02 ENCOUNTER — LAB (OUTPATIENT)
Dept: LAB | Facility: LAB | Age: 64
End: 2024-01-02
Payer: COMMERCIAL

## 2024-01-02 DIAGNOSIS — E55.9 VITAMIN D INSUFFICIENCY: ICD-10-CM

## 2024-01-02 DIAGNOSIS — E78.5 BORDERLINE HYPERLIPIDEMIA: ICD-10-CM

## 2024-01-02 DIAGNOSIS — E61.2 MAGNESIUM DEFICIENCY: ICD-10-CM

## 2024-01-02 LAB
25(OH)D3 SERPL-MCNC: 32 NG/ML (ref 30–100)
ALBUMIN SERPL BCP-MCNC: 4.4 G/DL (ref 3.4–5)
ALP SERPL-CCNC: 104 U/L (ref 33–136)
ALT SERPL W P-5'-P-CCNC: 18 U/L (ref 7–45)
ANION GAP SERPL CALC-SCNC: 11 MMOL/L (ref 10–20)
AST SERPL W P-5'-P-CCNC: 15 U/L (ref 9–39)
BILIRUB SERPL-MCNC: 0.4 MG/DL (ref 0–1.2)
BUN SERPL-MCNC: 8 MG/DL (ref 6–23)
CALCIUM SERPL-MCNC: 9.5 MG/DL (ref 8.6–10.3)
CHLORIDE SERPL-SCNC: 110 MMOL/L (ref 98–107)
CHOLEST SERPL-MCNC: 199 MG/DL (ref 0–199)
CHOLESTEROL/HDL RATIO: 3.4
CO2 SERPL-SCNC: 25 MMOL/L (ref 21–32)
CREAT SERPL-MCNC: 0.86 MG/DL (ref 0.5–1.05)
ERYTHROCYTE [DISTWIDTH] IN BLOOD BY AUTOMATED COUNT: 13.3 % (ref 11.5–14.5)
GFR SERPL CREATININE-BSD FRML MDRD: 77 ML/MIN/1.73M*2
GLUCOSE SERPL-MCNC: 80 MG/DL (ref 74–99)
HCT VFR BLD AUTO: 41.3 % (ref 36–46)
HDLC SERPL-MCNC: 58.9 MG/DL
HGB BLD-MCNC: 13.3 G/DL (ref 12–16)
LDLC SERPL CALC-MCNC: 115 MG/DL
MAGNESIUM SERPL-MCNC: 2.06 MG/DL (ref 1.6–2.4)
MCH RBC QN AUTO: 30.3 PG (ref 26–34)
MCHC RBC AUTO-ENTMCNC: 32.2 G/DL (ref 32–36)
MCV RBC AUTO: 94 FL (ref 80–100)
NON HDL CHOLESTEROL: 140 MG/DL (ref 0–149)
NRBC BLD-RTO: 0 /100 WBCS (ref 0–0)
PLATELET # BLD AUTO: 238 X10*3/UL (ref 150–450)
POTASSIUM SERPL-SCNC: 3.9 MMOL/L (ref 3.5–5.3)
PROT SERPL-MCNC: 6.2 G/DL (ref 6.4–8.2)
RBC # BLD AUTO: 4.39 X10*6/UL (ref 4–5.2)
SODIUM SERPL-SCNC: 142 MMOL/L (ref 136–145)
TRIGL SERPL-MCNC: 128 MG/DL (ref 0–149)
TSH SERPL-ACNC: 2.34 MIU/L (ref 0.44–3.98)
VLDL: 26 MG/DL (ref 0–40)
WBC # BLD AUTO: 4 X10*3/UL (ref 4.4–11.3)

## 2024-01-02 PROCEDURE — 36415 COLL VENOUS BLD VENIPUNCTURE: CPT

## 2024-01-02 PROCEDURE — 82306 VITAMIN D 25 HYDROXY: CPT

## 2024-01-02 RX ORDER — HYOSCYAMINE SULFATE 0.125 MG
0.12 TABLET ORAL EVERY 6 HOURS PRN
Qty: 120 TABLET | Refills: 0 | Status: SHIPPED | OUTPATIENT
Start: 2024-01-02 | End: 2024-03-06 | Stop reason: SDUPTHER

## 2024-01-02 NOTE — TELEPHONE ENCOUNTER
Requested Prescriptions     Pending Prescriptions Disp Refills    hyoscyamine (Anaspaz, Levsin) 0.125 mg tablet [Pharmacy Med Name: Hyoscyamine Sulfate Oral Tablet 0.125 MG] 120 tablet 0     Sig: TAKE ONE TABLET BY MOUTH 3 TO 4 TIMES DAILY

## 2024-01-15 NOTE — PROGRESS NOTES
Subjective     HPI   Lorraine Trivedi is a 63 y.o. year old female patient with presenting to clinic with concern for   Chief Complaint   Patient presents with    Annual Exam     Physical.        Lorraine presents for annual wellness exam    Has noticed hand stiffness, polyarthralgia, neck stiffness and chronic pain. Mom had RA. Possible autoimmune concerns?    Also due for medication refill   OARRS:  As expected 1/14/24  I have personally reviewed the OARRS report for Lorraine Trivedi. I have considered the risks of abuse, dependence, addiction and diversion    Is the patient prescribed a combination of a benzodiazepine and opioid?  No    Last Urine Drug Screen / ordered today: Yes  Recent Results (from the past 8760 hour(s))   OPIATE/OPIOID/BENZO PRESCRIPTION COMPLIANCE    Collection Time: 05/24/23  2:59 PM   Result Value Ref Range    DRUG SCREEN COMMENT URINE SEE BELOW     Creatine, Urine 10.2 (A) mg/dL    Specific Gravity, Urine Drug 1.0020 Valid 1.0020-1.0200    Amphetamine Screen, Urine PRESUMPTIVE NEGATIVE NEGATIVE    Barbiturate Screen, Urine PRESUMPTIVE NEGATIVE NEGATIVE    Cannabinoid Screen, Urine PRESUMPTIVE NEGATIVE NEGATIVE    Cocaine Screen, Urine PRESUMPTIVE NEGATIVE NEGATIVE    PCP Screen, Urine PRESUMPTIVE NEGATIVE NEGATIVE    7-Aminoclonazepam 71 (A) Cutoff <25 ng/mL    Alpha-Hydroxyalprazolam <25 Cutoff <25 ng/mL    Alpha-Hydroxymidazolam <25 Cutoff <25 ng/mL    Alprazolam <25 Cutoff <25 ng/mL    Chlordiazepoxide <25 Cutoff <25 ng/mL    Clonazepam <25 Cutoff <25 ng/mL    Diazepam <25 Cutoff <25 ng/mL    Lorazepam <25 Cutoff <25 ng/mL    Midazolam <25 Cutoff <25 ng/mL    Nordiazepam <25 Cutoff <25 ng/mL    Oxazepam <25 Cutoff <25 ng/mL    Temazepam <25 Cutoff <25 ng/mL    Zolpidem <25 Cutoff <25 ng/mL    Zolpidem Metabolite (ZCA) <25 Cutoff <25 ng/mL    6-Acetylmorphine <25 Cutoff <25 ng/mL    Codeine <50 Cutoff <50 ng/mL    Hydrocodone <25 Cutoff <25 ng/mL    Hydromorphone <25 Cutoff <25 ng/mL     Morphine Urine <50 Cutoff <50 ng/mL    Norhydrocodone <25 Cutoff <25 ng/mL    Noroxycodone <25 Cutoff <25 ng/mL    Oxycodone <25 Cutoff <25 ng/mL    Oxymorphone <25 Cutoff <25 ng/mL    Tramadol <50 Cutoff <50 ng/mL    O-Desmethyltramadol <50 Cutoff <50 ng/mL    Fentanyl <2.5 Cutoff<2.5 ng/mL    Norfentanyl <2.5 Cutoff<2.5 ng/mL    METHADONE CONFIRMATION,URINE <25 Cutoff <25 ng/mL    EDDP <25 Cutoff <25 ng/mL     Results are as expected.         Controlled Substance Agreement:  Date of the Last Agreement: 3/9/23  Reviewed Controlled Substance Agreement including but not limited to the benefits, risks, and alternatives to treatment with a Controlled Substance medication(s).    Benzodiazepines:  What is the patient's goal of therapy? improvement of debilitating effects of anxiety  Is this being achieved with current treatment? yes    RIO-7:  No data recorded    Activities of Daily Living:   Is your overall impression that this patient is benefiting (symptom reduction outweighs side effects) from benzodiazepine therapy? Yes     1. Physical Functioning: Better  2. Family Relationship: Better  3. Social Relationship: Better  4. Mood: Same  5. Sleep Patterns: Same  6. Overall Function: Better     and     Lyrica:  What is the patient's goal of therapy? pain relief, improved mobility and quality of life   Is this being achieved with current treatment? yes    Pain Assessment:  No data recorded    Activities of Daily Living:  Is your overall impression that this patient is benefiting (symptom reduction outweighs side effects) from Lyrica therapy? Yes     1. Physical Functioning: Better  2. Family Relationship: Better  3. Social Relationship: Better  4. Mood: Better  5. Sleep Patterns: Better  6. Overall Function: Better      Patient Active Problem List   Diagnosis    Abnormal EKG    Occipital neuralgia    Cervical myofascial pain syndrome    Chronic radicular low back pain    Dyslipidemia    GERD (gastroesophageal reflux  disease)    History of cold sores    Irritable bowel syndrome with diarrhea    Peroneal tendon tear    Pes cavus, congenital    Simple cyst of kidney    Vertigo    Overweight with body mass index (BMI) of 29 to 29.9 in adult    Nausea in adult    Migraine without aura and without status migrainosus, not intractable       Past Medical History:   Diagnosis Date    Alkaline phosphatase elevation 2023    Ankle impingement syndrome 2023    Ankle pain 2023    Back pain, thoracic 2023    DDD (degenerative disc disease), cervical 2023    Glossodynia 2023    Radiculopathy, lumbar region 2015    Chronic radicular lumbar pain    Seasickness 2023    Thoracic back pain 2023    Thrush, oral 2023      Past Surgical History:   Procedure Laterality Date    EYE SURGERY  2015    Eye Surgery    OTHER SURGICAL HISTORY  2020    Neck surgery    OTHER SURGICAL HISTORY  2020    Foot surgery    OTHER SURGICAL HISTORY  2018    Ankle surgery    TUBAL LIGATION  2015    Tubal Ligation      Family History   Problem Relation Name Age of Onset    Diabetes Mother      Breast cancer Mother      Cancer Father      Heart failure Other Grandmother     Heart failure Other grandparent       Social History     Tobacco Use    Smoking status: Former     Packs/day: 1.00     Years: 35.00     Additional pack years: 0.00     Total pack years: 35.00     Types: Cigarettes     Quit date:      Years since quittin.0    Smokeless tobacco: Never   Substance Use Topics    Alcohol use: Never        Current Outpatient Medications:     esomeprazole (NexIUM) 40 mg DR capsule, TAKE 1 CAPSULE TWICE DAILY 30 MINUTES PRIOR TO BREAKFAST AND DINNER., Disp: , Rfl:     hyoscyamine (Anaspaz, Levsin) 0.125 mg tablet, Take 1 tablet (0.125 mg) by mouth every 6 hours if needed for cramping., Disp: 120 tablet, Rfl: 0    meclizine (Antivert) 12.5 mg tablet, Take 1 tablet (12.5 mg) by mouth  every 8 hours if needed (For Vertigo)., Disp: 180 tablet, Rfl: 3    methocarbamol (Robaxin) 750 mg tablet, Take 1 tablet (750 mg) by mouth 3 times a day as needed for muscle spasms. PRN, Disp: 270 tablet, Rfl: 1    predniSONE (Deltasone) 20 mg tablet, Take 3 tabs (60mg) daily for 3 days, then take 2 tabs (40mg) daily for 3 days, then take 1 tab (20mg) daily for 3 days., Disp: 18 tablet, Rfl: 0    rifAXIMin (Xifaxan) 550 mg tablet, Take 1 tablet (550 mg) by mouth 3 times a day., Disp: , Rfl:     clonazePAM (KlonoPIN) 0.5 mg tablet, Take 1 tablet (0.5 mg) by mouth once daily at bedtime. To help sleep with pain and anxiety, Disp: 30 tablet, Rfl: 2    ibuprofen (IBU) 800 mg tablet, Take 1 tablet (800 mg) by mouth every 8 hours if needed for moderate pain (4 - 6)., Disp: 90 tablet, Rfl: 3    ondansetron ODT (Zofran-ODT) 4 mg disintegrating tablet, Take 1 tablet (4 mg) by mouth every 12 hours if needed for nausea or vomiting., Disp: 20 tablet, Rfl: 11    pravastatin (Pravachol) 10 mg tablet, Take 1 tablet (10 mg) by mouth once daily at bedtime., Disp: 90 tablet, Rfl: 3    pregabalin (Lyrica) 75 mg capsule, 1 capsule PO in the morning, 1 capsule in the afternoon, 2 capsules a bedtime, Disp: 112 capsule, Rfl: 2    promethazine (Phenergan) 12.5 mg tablet, Take 1 tablet (12.5 mg) by mouth every 8 hours if needed for nausea or vomiting., Disp: 90 tablet, Rfl: 3    rizatriptan MLT (Maxalt-MLT) 10 mg disintegrating tablet, Take 1 tablet (10 mg) by mouth 1 time if needed for migraine. May repeat in 2 hours if unresolved. Do not exceed 30 mg in 24 hours., Disp: 30 tablet, Rfl: 11    topiramate (Topamax) 100 mg tablet, Take 1 tablet (100 mg) by mouth once daily., Disp: 90 tablet, Rfl: 3     Review of Systems  Constitutional: Denies fever  HEENT: Denies ST, earache  CVS: Denies Chest pain  Pulmonary: Denies wheezing, SOB  GI: Denies N/V  : Denies dysuria  Musculoskeletal:  Denies myalgia  Neuro: Denies focal weakness or  "numbness.  Skin: Denies Rashes.  *Review of Systems is negative unless otherwise mentioned in HPI or ROS above.    Objective   /70   Pulse 72   Temp 37 °C (98.6 °F)   Ht 1.778 m (5' 10\")   Wt 95.4 kg (210 lb 5.8 oz)   SpO2 99%   BMI 30.18 kg/m²  reviewed Body mass index is 30.18 kg/m².     Physical Exam  Constitutional: NAD.  Resting comfortably.  Head: Atraumatic, normocephalic.  ENT: Moist oral mucosa. Nasal mucosa wnl.   Cardiac: Regular rate & rhythm.   Pulmonary: Lungs clear bilat  GI: Soft, Nontender, nondistended.   Musculoskeletal: No peripheral edema. Limited ROM neck side to side rotation.. Low back pain limits mobility and ambulation. Left ankle up and down only, no lateral or rotation ability since peroneal surgery L leg.  Skin: No evidence of trauma. No rashes  Psych: Intact judgement and insight.    .Assessment/Plan   Problem List Items Addressed This Visit             ICD-10-CM    Occipital neuralgia M54.81    Relevant Medications    pregabalin (Lyrica) 75 mg capsule    clonazePAM (KlonoPIN) 0.5 mg tablet    Chronic radicular low back pain M54.16, G89.29    Relevant Orders    Disability Placard    Dyslipidemia E78.5    Relevant Medications    pravastatin (Pravachol) 10 mg tablet    Peroneal tendon tear S86.319A    Relevant Orders    Disability Placard    Migraine without aura and without status migrainosus, not intractable G43.009    Relevant Medications    rizatriptan MLT (Maxalt-MLT) 10 mg disintegrating tablet    topiramate (Topamax) 100 mg tablet     Other Visit Diagnoses         Codes    Polyarthralgia    -  Primary M25.50    Relevant Orders    ZAC + ROVERTO Panel    Citrulline Antibody, IgG    Cervical disc disorder with radiculopathy     M50.10    Relevant Medications    pregabalin (Lyrica) 75 mg capsule    Nausea     R11.0    Relevant Medications    promethazine (Phenergan) 12.5 mg tablet    ondansetron ODT (Zofran-ODT) 4 mg disintegrating tablet    Headache, cervicogenic     G44.86    " Relevant Medications    ibuprofen (IBU) 800 mg tablet    Routine general medical examination at a health care facility     Z00.00

## 2024-01-16 ENCOUNTER — LAB (OUTPATIENT)
Dept: LAB | Facility: LAB | Age: 64
End: 2024-01-16
Payer: COMMERCIAL

## 2024-01-16 ENCOUNTER — OFFICE VISIT (OUTPATIENT)
Dept: PRIMARY CARE | Facility: CLINIC | Age: 64
End: 2024-01-16
Payer: COMMERCIAL

## 2024-01-16 VITALS
HEIGHT: 70 IN | WEIGHT: 210.36 LBS | HEART RATE: 72 BPM | DIASTOLIC BLOOD PRESSURE: 70 MMHG | TEMPERATURE: 98.6 F | BODY MASS INDEX: 30.12 KG/M2 | SYSTOLIC BLOOD PRESSURE: 120 MMHG | OXYGEN SATURATION: 99 %

## 2024-01-16 DIAGNOSIS — M50.10 CERVICAL DISC DISORDER WITH RADICULOPATHY: ICD-10-CM

## 2024-01-16 DIAGNOSIS — G43.009 MIGRAINE WITHOUT AURA AND WITHOUT STATUS MIGRAINOSUS, NOT INTRACTABLE: ICD-10-CM

## 2024-01-16 DIAGNOSIS — S86.312S TEAR OF PERONEAL TENDON, LEFT, SEQUELA: ICD-10-CM

## 2024-01-16 DIAGNOSIS — E78.5 DYSLIPIDEMIA: ICD-10-CM

## 2024-01-16 DIAGNOSIS — M25.50 POLYARTHRALGIA: Primary | ICD-10-CM

## 2024-01-16 DIAGNOSIS — Z00.00 ROUTINE GENERAL MEDICAL EXAMINATION AT A HEALTH CARE FACILITY: ICD-10-CM

## 2024-01-16 DIAGNOSIS — M54.16 CHRONIC RADICULAR LOW BACK PAIN: ICD-10-CM

## 2024-01-16 DIAGNOSIS — G89.29 CHRONIC RADICULAR LOW BACK PAIN: ICD-10-CM

## 2024-01-16 DIAGNOSIS — M25.50 POLYARTHRALGIA: ICD-10-CM

## 2024-01-16 DIAGNOSIS — M54.81 BILATERAL OCCIPITAL NEURALGIA: ICD-10-CM

## 2024-01-16 DIAGNOSIS — G44.86 HEADACHE, CERVICOGENIC: ICD-10-CM

## 2024-01-16 DIAGNOSIS — R11.0 NAUSEA: ICD-10-CM

## 2024-01-16 PROCEDURE — 86200 CCP ANTIBODY: CPT

## 2024-01-16 PROCEDURE — 86235 NUCLEAR ANTIGEN ANTIBODY: CPT

## 2024-01-16 PROCEDURE — 99396 PREV VISIT EST AGE 40-64: CPT | Performed by: PHYSICIAN ASSISTANT

## 2024-01-16 PROCEDURE — 86225 DNA ANTIBODY NATIVE: CPT

## 2024-01-16 PROCEDURE — 1036F TOBACCO NON-USER: CPT | Performed by: PHYSICIAN ASSISTANT

## 2024-01-16 PROCEDURE — 36415 COLL VENOUS BLD VENIPUNCTURE: CPT

## 2024-01-16 PROCEDURE — 86038 ANTINUCLEAR ANTIBODIES: CPT

## 2024-01-16 RX ORDER — PREGABALIN 75 MG/1
CAPSULE ORAL
Qty: 112 CAPSULE | Refills: 2 | Status: SHIPPED | OUTPATIENT
Start: 2024-01-16 | End: 2024-04-16 | Stop reason: SDUPTHER

## 2024-01-16 RX ORDER — RIZATRIPTAN BENZOATE 10 MG/1
10 TABLET, ORALLY DISINTEGRATING ORAL ONCE AS NEEDED
Qty: 30 TABLET | Refills: 11 | Status: SHIPPED | OUTPATIENT
Start: 2024-01-16 | End: 2025-01-15

## 2024-01-16 RX ORDER — PRAVASTATIN SODIUM 10 MG/1
10 TABLET ORAL NIGHTLY
Qty: 90 TABLET | Refills: 3 | Status: SHIPPED | OUTPATIENT
Start: 2024-01-16 | End: 2024-04-16 | Stop reason: SDUPTHER

## 2024-01-16 RX ORDER — PROMETHAZINE HYDROCHLORIDE 12.5 MG/1
12.5 TABLET ORAL EVERY 8 HOURS PRN
Qty: 90 TABLET | Refills: 3 | Status: SHIPPED | OUTPATIENT
Start: 2024-01-16 | End: 2025-01-15

## 2024-01-16 RX ORDER — IBUPROFEN 800 MG/1
800 TABLET ORAL EVERY 8 HOURS PRN
Qty: 90 TABLET | Refills: 3 | Status: SHIPPED | OUTPATIENT
Start: 2024-01-16 | End: 2024-04-16 | Stop reason: SDUPTHER

## 2024-01-16 RX ORDER — CLONAZEPAM 0.5 MG/1
0.5 TABLET ORAL NIGHTLY
Qty: 30 TABLET | Refills: 2 | Status: SHIPPED | OUTPATIENT
Start: 2024-01-16 | End: 2024-04-16 | Stop reason: SDUPTHER

## 2024-01-16 RX ORDER — ONDANSETRON 4 MG/1
4 TABLET, ORALLY DISINTEGRATING ORAL EVERY 12 HOURS PRN
Qty: 20 TABLET | Refills: 11 | Status: SHIPPED | OUTPATIENT
Start: 2024-01-16 | End: 2024-04-16 | Stop reason: SDUPTHER

## 2024-01-16 RX ORDER — TOPIRAMATE 100 MG/1
100 TABLET, FILM COATED ORAL DAILY
Qty: 90 TABLET | Refills: 3 | Status: SHIPPED | OUTPATIENT
Start: 2024-01-16 | End: 2025-01-15

## 2024-01-16 ASSESSMENT — ANXIETY QUESTIONNAIRES
IF YOU CHECKED OFF ANY PROBLEMS ON THIS QUESTIONNAIRE, HOW DIFFICULT HAVE THESE PROBLEMS MADE IT FOR YOU TO DO YOUR WORK, TAKE CARE OF THINGS AT HOME, OR GET ALONG WITH OTHER PEOPLE: NOT DIFFICULT AT ALL
5. BEING SO RESTLESS THAT IT IS HARD TO SIT STILL: NOT AT ALL
4. TROUBLE RELAXING: NOT AT ALL
2. NOT BEING ABLE TO STOP OR CONTROL WORRYING: NOT AT ALL
1. FEELING NERVOUS, ANXIOUS, OR ON EDGE: NOT AT ALL
3. WORRYING TOO MUCH ABOUT DIFFERENT THINGS: NOT AT ALL
7. FEELING AFRAID AS IF SOMETHING AWFUL MIGHT HAPPEN: NOT AT ALL
6. BECOMING EASILY ANNOYED OR IRRITABLE: NOT AT ALL
GAD7 TOTAL SCORE: 0

## 2024-01-16 ASSESSMENT — PATIENT HEALTH QUESTIONNAIRE - PHQ9
2. FEELING DOWN, DEPRESSED OR HOPELESS: NOT AT ALL
SUM OF ALL RESPONSES TO PHQ9 QUESTIONS 1 AND 2: 0
1. LITTLE INTEREST OR PLEASURE IN DOING THINGS: NOT AT ALL

## 2024-01-17 LAB
ANA SER QL HEP2 SUBST: NEGATIVE
CCP IGG SERPL-ACNC: <1 U/ML
CENTROMERE B AB SER-ACNC: <0.2 AI
CHROMATIN AB SERPL-ACNC: <0.2 AI
DSDNA AB SER-ACNC: <1 IU/ML
ENA JO1 AB SER QL IA: <0.2 AI
ENA RNP AB SER IA-ACNC: <0.2 AI
ENA SCL70 AB SER QL IA: 0.4 AI
ENA SM AB SER IA-ACNC: <0.2 AI
ENA SM+RNP AB SER QL IA: <0.2 AI
ENA SS-A AB SER IA-ACNC: 0.4 AI
ENA SS-B AB SER IA-ACNC: <0.2 AI
RIBOSOMAL P AB SER-ACNC: <0.2 AI

## 2024-01-18 ENCOUNTER — APPOINTMENT (OUTPATIENT)
Dept: PRIMARY CARE | Facility: CLINIC | Age: 64
End: 2024-01-18
Payer: COMMERCIAL

## 2024-02-09 ENCOUNTER — HOSPITAL ENCOUNTER (OUTPATIENT)
Dept: RADIOLOGY | Facility: CLINIC | Age: 64
Discharge: HOME | End: 2024-02-09
Payer: COMMERCIAL

## 2024-02-09 ENCOUNTER — OFFICE VISIT (OUTPATIENT)
Dept: ORTHOPEDIC SURGERY | Facility: CLINIC | Age: 64
End: 2024-02-09
Payer: COMMERCIAL

## 2024-02-09 DIAGNOSIS — M72.2 BILATERAL PLANTAR FASCIITIS: Primary | ICD-10-CM

## 2024-02-09 DIAGNOSIS — G57.32 ENTRAPMENT NEUROPATHY OF LEFT COMMON PERONEAL NERVE: ICD-10-CM

## 2024-02-09 DIAGNOSIS — M25.571 BILATERAL ANKLE PAIN, UNSPECIFIED CHRONICITY: ICD-10-CM

## 2024-02-09 DIAGNOSIS — M25.572 BILATERAL ANKLE PAIN, UNSPECIFIED CHRONICITY: ICD-10-CM

## 2024-02-09 PROCEDURE — 73610 X-RAY EXAM OF ANKLE: CPT | Mod: 50

## 2024-02-09 PROCEDURE — 1036F TOBACCO NON-USER: CPT | Performed by: STUDENT IN AN ORGANIZED HEALTH CARE EDUCATION/TRAINING PROGRAM

## 2024-02-09 PROCEDURE — 99204 OFFICE O/P NEW MOD 45 MIN: CPT | Performed by: STUDENT IN AN ORGANIZED HEALTH CARE EDUCATION/TRAINING PROGRAM

## 2024-02-09 RX ORDER — MELOXICAM 15 MG/1
15 TABLET ORAL DAILY
Qty: 10 TABLET | Refills: 0 | Status: SHIPPED | OUTPATIENT
Start: 2024-02-09 | End: 2024-02-19

## 2024-02-09 ASSESSMENT — PAIN - FUNCTIONAL ASSESSMENT: PAIN_FUNCTIONAL_ASSESSMENT: 0-10

## 2024-02-09 ASSESSMENT — PAIN DESCRIPTION - DESCRIPTORS: DESCRIPTORS: ACHING

## 2024-02-09 ASSESSMENT — PAIN SCALES - GENERAL: PAINLEVEL_OUTOF10: 6

## 2024-02-09 NOTE — PROGRESS NOTES
ORTHOPAEDIC SURGERY HISTORY & PHYSICAL     Chief Complaint:  Bilateral foot pain    History Of Present Illness  Lorraine Trivedi is a 63 y.o. female who presents for complex evaluation of bilateral left worse than right foot and ankle pain, self referred.  Patient reports a longstanding history of bilateral plantar foot pain.  She reports a history of at least 3 prior left ankle surgeries.  By chart review, including [facility] EMR, she underwent left ankle arthroscopic debridement with modified Broström (with IB) and peroneal tendon reconstruction with tenodesis with Dr. Díza from 04/18/2018 followed by repeat left ankle arthroscopic debridement with peroneal tendon tenolysis from 12/12/2018 after new injury.  Of note the patient has more recently been treated by an [facility] podiatrist with what sounds like a subtalar arthrodesis and at least common peroneal nerve neurolysis.  Patient made attempts to have her records transferred for my review, however I do not have them at the time of this evaluation.    Patient continues with daily left ankle pain and swelling, however her primary complaint is her bilateral plantar foot pain.  Patient was doing well up until the end of January when she sustained a fall onto her bilateral knees.  This appears to have significantly exacerbated her pain.  At present, patient rates her pain as 6 out of 10.  She has been in physical therapy for years for this and performs an HEP twice daily by report.  She has had 3 corticosteroid injections into her plantar fascia on the left side, none on the right.  She has not had significant relief.  She reports previous custom orthotic use, again without significant relief.  She has followed with pain management for chronic neck and back pain and has been receiving injections for migraines as well as treatment for trigeminal neuralgia.  She reports limited neck range of motion secondary to her previous spine surgery and continued neck pain.  She takes  gabapentin, topiramate, Robaxin and reports near daily ibuprofen use without significant relief.    Of note, patient recently underwent a rheumatologic panel that was apparently negative for rheumatologic disease.     Past Medical History  Past Medical History:   Diagnosis Date    Alkaline phosphatase elevation 2023    Ankle impingement syndrome 2023    Ankle pain 2023    Back pain, thoracic 2023    DDD (degenerative disc disease), cervical 2023    Glossodynia 2023    Radiculopathy, lumbar region 2015    Chronic radicular lumbar pain    Seasickness 2023    Thoracic back pain 2023    Thrush, oral 2023       Surgical History  Recent Surgeries in Orthopaedic Surgery            No cases to display             Social History  Social History     Socioeconomic History    Marital status:      Spouse name: None    Number of children: None    Years of education: None    Highest education level: None   Occupational History    None   Tobacco Use    Smoking status: Former     Packs/day: 1.00     Years: 35.00     Additional pack years: 0.00     Total pack years: 35.00     Types: Cigarettes     Quit date:      Years since quittin.1    Smokeless tobacco: Never   Substance and Sexual Activity    Alcohol use: Never    Drug use: Never    Sexual activity: None   Other Topics Concern    None   Social History Narrative    None     Social Determinants of Health     Financial Resource Strain: Not on file   Food Insecurity: Not on file   Transportation Needs: Not on file   Physical Activity: Not on file   Stress: Not on file   Social Connections: Not on file   Intimate Partner Violence: Not on file   Housing Stability: Not on file       Family History  Family History   Problem Relation Name Age of Onset    Diabetes Mother      Breast cancer Mother      Cancer Father      Heart failure Other Grandmother     Heart failure Other grandparent         Allergies  No Known  Allergies    Review of Systems  REVIEW OF SYSTEMS  Constitutional: no unplanned weight loss  Psychiatric: no suicidal ideation  ENT: no vision changes, no sinus problems  Pulmonary: no shortness of breath  Lymphatic: no enlarged lymph nodes  Cardiovascular: no chest pain or shortness of breath  Gastrointestinal: no stomach problems  Genitourinary: no dysuria   Skin: no rashes  Endocrine: no thyroid problems  Neurological: no headache, no numbness  Hematological: no easy bruising  Musculoskeletal: Bilateral foot and left ankle pain    Physical Exam  PHYSICAL EXAMINATION  Constitutional Exam: well developed and well nourished  Psychiatric Exam: alert and oriented, appropriate mood and behavior  Eye Exam: EOMI  Pulmonary Exam: breathing non-labored, no apparent distress  Lymphatic exam: no appreciable lymphadenopathy in the lower extremities  Cardiovascular exam: RRR to peripheral palpation, DP pulses 2+, PT 2+, toes are pink with good capillary refill, no pitting edema  Skin exam: no open lesions, rashes, abrasions or ulcerations  Neurological exam: sensation to light touch intact in both lower extremities in peripheral and dermatomal distributions (except for any abnormalities noted in musculoskeletal exam)    Musculoskeletal exam:  Right lower extremity examination.  Patient pain localized to the plantar aspect of the foot.  She is focally tender to palpation along the medial longitudinal band of the plantar fascia, this is worsened with hyperdorsiflexion of the toes.  Patient has hindfoot varus alignment with peekaboo heel and subtle cavus foot posture with no significant forefoot deformity noted.  Patient has pain-free and supple ankle, subtalar and midtarsal joint range of motion.  Patient has sensation intact to light touch grossly in a saphenous, sural, superficial peroneal, deep peroneal and tibial nerve distribution.  Patient has 5 out of 5 strength in plantarflexion, dorsiflexion and EHL. Patient has 2+  DP/PT pulse palpated.  Patient has a negative calcaneal compression test, negative Tinel's about the posterior and anterior tarsal tunnel.    Left lower extremity examination.  Patient has well-healed lateral fibular incision as well as anterolateral ankle incision, ? SPN neurolysis with more proximal incision overlying CPN also well-healed.  Patient pain again primarily localized to the plantar aspect of the foot.  She is focally tender to palpation along the medial longitudinal band of the plantar fascia, this is worsened with hyperdorsiflexion of the toes.  Patient has mild tenderness to palpation about the peroneal tendons.  Patient has hindfoot varus alignment with peekaboo heel and subtle cavus foot posture with no significant forefoot deformity noted. Patient has pain-free and supple ankle, subtalar and midtarsal joint range of motion.  Patient has sensation intact to light touch grossly in a saphenous, sural and tibial nerve distribution, reported numbness in superficial and deep peroneal nerve distribution.  Patient has 5 out of 5 strength in plantarflexion, dorsiflexion and EHL. Patient has 2+ DP/PT pulse palpated.  Patient has a negative calcaneal compression test, negative Tinel's at the posterior/anterior tarsal tunnel as well as CPN and SPN.    Last Recorded Vitals  There were no vitals taken for this visit.    Laboratory Results    No results found for this or any previous visit (from the past 24 hour(s)).    Radiology Results   X-ray imaging 3 view weightbearing bilateral ankle reviewed from 02/09/2024 and independently evaluated by me demonstrates no acute fracture or dislocation.  Incompletely visualized forefoot suggests cavus posture.  There is prominent posterior os trigonum noted on the left and sequela of subtalar arthrodesis with cannulated screw fixation and apparently well-healed arthrodesis.  There is a lucency about the talar neck, likely secondary to previous IB  anchor.    Assessment/Plan:  63-year-old female who presents with a longstanding and complex history related to bilateral plantar foot pain and left ankle pain who in my impression has bilateral symptomatic plantar fasciitis and suspected neuritic type symptoms in setting of previous cervical spine surgery and L CPN neurolysis.  I have reviewed the diagnosis and treatment options extensively with the patient.  In my impression the patient may continue weightbearing as tolerated in her bilateral lower extremities.  I will provide her with a once daily anti-inflammatory and have asked her to discontinue her ibuprofen use while taking this medication.  I will provide her with silicone heel cups for offloading of her painful heels.  I have instructed the patient regarding an HEP for plantar fascia stretching and have counseled the patient regarding the need for sturdy, supportive and accommodative footwear.  I have asked the patient to obtain her [facility] records for my review and inclusion within the medical record.  I discussed frankly with the patient that there is likely overlay with some of her nerve symptoms and pending record review may obtain an EMG/NCS to more completely evaluate her clinical presentation.  I reviewed that I would consider 1 additional image guided plantar fascia injection if her symptoms are not clinically improving but that this could risk PF rupture.  I discussed I have very guarded expectations regarding pain improvement following consideration for partial plantar fasciectomy as the patient reports that she has not had reliable relief of her plantar foot symptoms from any previous intervention.  I will plan to see the patient back in approximately 3 weeks to monitor her response to treatment.  Upon return, patient would not require further imaging.    Julian Hurt MD, SAMEERA  Department of Orthopaedic Surgery  [facility]    The diagnosis and treatment plan  were reviewed with the patient. All questions were answered. The patient verbalized understanding of the treatment plan. There were no barriers to understanding identified.    Note dictated with Carma software.  Completed without full type editing and sent to avoid delay.

## 2024-03-01 ENCOUNTER — APPOINTMENT (OUTPATIENT)
Dept: ORTHOPEDIC SURGERY | Facility: CLINIC | Age: 64
End: 2024-03-01
Payer: COMMERCIAL

## 2024-03-05 ENCOUNTER — TELEPHONE (OUTPATIENT)
Dept: PRIMARY CARE | Facility: CLINIC | Age: 64
End: 2024-03-05
Payer: COMMERCIAL

## 2024-03-06 ENCOUNTER — OFFICE VISIT (OUTPATIENT)
Dept: PRIMARY CARE | Facility: CLINIC | Age: 64
End: 2024-03-06
Payer: COMMERCIAL

## 2024-03-06 VITALS
DIASTOLIC BLOOD PRESSURE: 85 MMHG | HEART RATE: 69 BPM | SYSTOLIC BLOOD PRESSURE: 138 MMHG | HEIGHT: 70 IN | WEIGHT: 209.4 LBS | TEMPERATURE: 97 F | OXYGEN SATURATION: 98 % | BODY MASS INDEX: 29.98 KG/M2

## 2024-03-06 DIAGNOSIS — H92.03 OTALGIA OF BOTH EARS: ICD-10-CM

## 2024-03-06 DIAGNOSIS — K21.9 GASTROESOPHAGEAL REFLUX DISEASE, UNSPECIFIED WHETHER ESOPHAGITIS PRESENT: ICD-10-CM

## 2024-03-06 DIAGNOSIS — R11.0 NAUSEA IN ADULT: ICD-10-CM

## 2024-03-06 DIAGNOSIS — H65.02 NON-RECURRENT ACUTE SEROUS OTITIS MEDIA OF LEFT EAR: Primary | ICD-10-CM

## 2024-03-06 DIAGNOSIS — J06.9 UPPER RESPIRATORY TRACT INFECTION, UNSPECIFIED TYPE: ICD-10-CM

## 2024-03-06 DIAGNOSIS — B00.1 COLD SORE: ICD-10-CM

## 2024-03-06 PROCEDURE — 99214 OFFICE O/P EST MOD 30 MIN: CPT | Performed by: PHYSICIAN ASSISTANT

## 2024-03-06 PROCEDURE — 1036F TOBACCO NON-USER: CPT | Performed by: PHYSICIAN ASSISTANT

## 2024-03-06 RX ORDER — LORATADINE 10 MG/1
10 TABLET ORAL DAILY
Qty: 14 TABLET | Refills: 0 | Status: SHIPPED | OUTPATIENT
Start: 2024-03-06 | End: 2024-04-16 | Stop reason: WASHOUT

## 2024-03-06 RX ORDER — METHYLPREDNISOLONE 4 MG/1
TABLET ORAL
Qty: 21 TABLET | Refills: 0 | Status: SHIPPED | OUTPATIENT
Start: 2024-03-06 | End: 2024-03-13

## 2024-03-06 RX ORDER — FLUTICASONE PROPIONATE 50 MCG
1 SPRAY, SUSPENSION (ML) NASAL DAILY
Qty: 16 G | Refills: 5 | Status: SHIPPED | OUTPATIENT
Start: 2024-03-06 | End: 2025-03-06

## 2024-03-06 RX ORDER — ACYCLOVIR 50 MG/G
1 OINTMENT TOPICAL
Qty: 5 G | Refills: 0 | Status: SHIPPED | OUTPATIENT
Start: 2024-03-06 | End: 2024-03-10

## 2024-03-06 RX ORDER — HYOSCYAMINE SULFATE 0.125 MG
0.12 TABLET ORAL EVERY 6 HOURS PRN
Qty: 120 TABLET | Refills: 0 | Status: SHIPPED | OUTPATIENT
Start: 2024-03-06 | End: 2024-04-05 | Stop reason: SDUPTHER

## 2024-03-06 ASSESSMENT — ENCOUNTER SYMPTOMS
DEPRESSION: 0
OCCASIONAL FEELINGS OF UNSTEADINESS: 0
LOSS OF SENSATION IN FEET: 0

## 2024-03-06 NOTE — PROGRESS NOTES
Subjective     HPI   Lorraine Trivedi is a 63 y.o. year old female patient with presenting to clinic with concern for   Chief Complaint   Patient presents with    Ear Fullness     Since Monday feels like they need popped  Had Botox done week ago       Ear pain bilat, eyes dry and irritated.     L TM mildly buldging, nonerythematous.     Cold sores upper right lip x 3 days. Started itchy eyes, earache, sneezing, rhinorrhea.     Botox injection done a couple weeks ago, but was late, occipital neuralgia has been worse than usual    Refills not due today    Patient Active Problem List   Diagnosis    Abnormal EKG    Occipital neuralgia    Cervical myofascial pain syndrome    Chronic radicular low back pain    Dyslipidemia    GERD (gastroesophageal reflux disease)    History of cold sores    Irritable bowel syndrome with diarrhea    Peroneal tendon tear    Pes cavus, congenital    Simple cyst of kidney    Vertigo    Overweight with body mass index (BMI) of 29 to 29.9 in adult    Nausea in adult    Otalgia of both ears    Migraine without aura and without status migrainosus, not intractable       Past Medical History:   Diagnosis Date    Alkaline phosphatase elevation 01/26/2023    Ankle impingement syndrome 01/26/2023    Ankle pain 01/26/2023    Back pain, thoracic 01/26/2023    DDD (degenerative disc disease), cervical 01/26/2023    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    Radiculopathy, lumbar region 07/22/2015    Chronic radicular lumbar pain    Seasickness 01/26/2023    Thoracic back pain 01/26/2023    Thrush, oral 01/26/2023      Past Surgical History:   Procedure Laterality Date    EYE SURGERY  04/20/2015    Eye Surgery    OTHER SURGICAL HISTORY  12/14/2020    Neck surgery    OTHER SURGICAL HISTORY  12/14/2020    Foot surgery    OTHER SURGICAL HISTORY  12/18/2018    Ankle surgery    TUBAL LIGATION  04/20/2015    Tubal Ligation      Family History   Problem Relation Name Age of Onset    Diabetes Mother      Breast cancer Mother       Cancer Father      Heart failure Other Grandmother     Heart failure Other grandparent       Social History     Tobacco Use    Smoking status: Former     Packs/day: 1.00     Years: 35.00     Additional pack years: 0.00     Total pack years: 35.00     Types: Cigarettes     Quit date:      Years since quittin.1    Smokeless tobacco: Never   Substance Use Topics    Alcohol use: Never        Current Outpatient Medications:     clonazePAM (KlonoPIN) 0.5 mg tablet, Take 1 tablet (0.5 mg) by mouth once daily at bedtime. To help sleep with pain and anxiety, Disp: 30 tablet, Rfl: 2    esomeprazole (NexIUM) 40 mg DR capsule, TAKE 1 CAPSULE TWICE DAILY 30 MINUTES PRIOR TO BREAKFAST AND DINNER., Disp: , Rfl:     hyoscyamine (Anaspaz, Levsin) 0.125 mg tablet, Take 1 tablet (0.125 mg) by mouth every 6 hours if needed for cramping., Disp: 120 tablet, Rfl: 0    ibuprofen (IBU) 800 mg tablet, Take 1 tablet (800 mg) by mouth every 8 hours if needed for moderate pain (4 - 6)., Disp: 90 tablet, Rfl: 3    meclizine (Antivert) 12.5 mg tablet, Take 1 tablet (12.5 mg) by mouth every 8 hours if needed (For Vertigo)., Disp: 180 tablet, Rfl: 3    methocarbamol (Robaxin) 750 mg tablet, Take 1 tablet (750 mg) by mouth 3 times a day as needed for muscle spasms. PRN, Disp: 270 tablet, Rfl: 1    ondansetron ODT (Zofran-ODT) 4 mg disintegrating tablet, Take 1 tablet (4 mg) by mouth every 12 hours if needed for nausea or vomiting., Disp: 20 tablet, Rfl: 11    pravastatin (Pravachol) 10 mg tablet, Take 1 tablet (10 mg) by mouth once daily at bedtime., Disp: 90 tablet, Rfl: 3    pregabalin (Lyrica) 75 mg capsule, 1 capsule PO in the morning, 1 capsule in the afternoon, 2 capsules a bedtime, Disp: 112 capsule, Rfl: 2    promethazine (Phenergan) 12.5 mg tablet, Take 1 tablet (12.5 mg) by mouth every 8 hours if needed for nausea or vomiting., Disp: 90 tablet, Rfl: 3    rifAXIMin (Xifaxan) 550 mg tablet, Take 1 tablet (550 mg) by mouth 3 times a  "day., Disp: , Rfl:     rizatriptan MLT (Maxalt-MLT) 10 mg disintegrating tablet, Take 1 tablet (10 mg) by mouth 1 time if needed for migraine. May repeat in 2 hours if unresolved. Do not exceed 30 mg in 24 hours., Disp: 30 tablet, Rfl: 11    topiramate (Topamax) 100 mg tablet, Take 1 tablet (100 mg) by mouth once daily., Disp: 90 tablet, Rfl: 3    acyclovir (Zovirax) 5 % ointment, Apply 1 Application topically 5 times a day for 4 days. Space applications every 3 hours., Disp: 5 g, Rfl: 0    loratadine (Claritin) 10 mg tablet, Take 1 tablet (10 mg) by mouth once daily for 14 days., Disp: 14 tablet, Rfl: 0    methylPREDNISolone (Medrol Dospak) 4 mg tablets, Take as directed on package., Disp: 21 tablet, Rfl: 0     Review of Systems  Constitutional: Denies fever  HEENT: Denies ST, earache  CVS: Denies Chest pain  Pulmonary: Denies wheezing, SOB  GI: Denies N/V  : Denies dysuria  Musculoskeletal:  Denies myalgia  Neuro: Denies focal weakness or numbness.  Skin: Denies Rashes.  *Review of Systems is negative unless otherwise mentioned in HPI or ROS above.    Objective   /85   Pulse 69   Temp 36.1 °C (97 °F)   Ht 1.778 m (5' 10\")   Wt 95 kg (209 lb 6.4 oz)   SpO2 98%   BMI 30.05 kg/m²  reviewed Body mass index is 30.05 kg/m².     Physical Exam  Constitutional: NAD.  Resting comfortably.  Head: Atraumatic, normocephalic.  ENT: Moist oral mucosa. Nasal mucosa w clear drainage. L TM mildly buldging, nonertyehmatous. Posterior pharyngeal streaking, clear.   Cardiac: Regular rate & rhythm.   Pulmonary: Lungs clear bilat  GI: Soft, Nontender, nondistended.   Musculoskeletal: No peripheral edema.   Skin: No evidence of trauma. Cluster of vessicles upper right lip at vermillion border.   Psych: Intact judgement and insight.    .Assessment/Plan   Problem List Items Addressed This Visit             ICD-10-CM    Otalgia of both ears H92.03    Relevant Medications    loratadine (Claritin) 10 mg tablet     Other Visit " Diagnoses         Codes    Non-recurrent acute serous otitis media of left ear    -  Primary H65.02    Relevant Medications    methylPREDNISolone (Medrol Dospak) 4 mg tablets    Cold sore     B00.1    Relevant Medications    acyclovir (Zovirax) 5 % ointment    Upper respiratory tract infection, unspecified type     J06.9

## 2024-03-07 RX ORDER — HYOSCYAMINE SULFATE 0.125 MG
TABLET ORAL
Qty: 120 TABLET | Refills: 0 | Status: SHIPPED | OUTPATIENT
Start: 2024-03-07 | End: 2024-04-16 | Stop reason: WASHOUT

## 2024-04-03 NOTE — PROGRESS NOTES
History Of Present Illness  Lorraine Trivedi is a 63 y.o. female presenting with a chief complaint of IBS and GERD.    Patient has seen Amanda Gonzalez CNP in the past for IBS-D and GERD.  She was last seen in January 2023.  She was previously on dicyclomine, but it was becoming less effective so she was switched to hyoscyamine 4 times daily PRN.  Rifaximin was ordered, but the medication wasn't covered so she couldn't take it. CNP recommended patient start a fiber supplement, low FODMAP diet, and pelvic floor exercises but she did not do these things. She is currently taking Nexium 40mg BID and hyoscyamine QID which keeps symptoms generally controlled.     Pt states when her occipital neuralgia flares up, so does her IBS- she will have abdominal pain, fecal urgency, and diarrhea. She will use Pepto during flares but it isn't always helpful and will constipate her at times. When she has flares, Imodium does not help her diarrhea. She does eat home-cooked meals with lots of vegetables and avoids fast or fried foods in general to keep her IBS controlled. She avoids red sauce and garlic as much as possible for her GERD.    She had a cervical fusion and has had occasional pharyngeal dysphagia since then. Her EGD was unremarkable.     Social History  She reports that she quit smoking about 8 years ago. Her smoking use included cigarettes. She has a 35.00 pack-year smoking history. She has quit using smokeless tobacco. She reports that she does not currently use alcohol. She reports that she does not use drugs.  She takes NSAIDs on a regular basis intermittently- ibuprofen due to occipital neuralgia     Family History  Family History   Problem Relation Name Age of Onset    Diabetes Mother      Breast cancer Mother          mastectomy    Stroke Mother          x2    Cancer Father Pete     Alcohol abuse Father Pete     Stomach cancer Father Pete     Heart attack Brother  50    Heart attack Mother's Brother      Heart attack  "Maternal Grandfather      Other (pacemaker) Maternal Grandfather      Heart failure Other Grandmother     Heart failure Other grandparent      The patient does not have a FH of CRC. she does not have a FH of IBD    Review of Systems   Constitutional:  Negative for appetite change, chills, diaphoresis, fatigue, fever and unexpected weight change.   HENT:  Negative for trouble swallowing.    Gastrointestinal:  Negative for abdominal distention, abdominal pain, anal bleeding, blood in stool, constipation, diarrhea, nausea, rectal pain and vomiting.        See HPI   All other systems reviewed and are negative.        Physical Exam  Constitutional:       Appearance: She is overweight.   HENT:      Head: Normocephalic and atraumatic.   Eyes:      Conjunctiva/sclera: Conjunctivae normal.      Pupils: Pupils are equal, round, and reactive to light.   Pulmonary:      Effort: Pulmonary effort is normal.   Abdominal:      General: Bowel sounds are normal. There is no distension.      Palpations: Abdomen is soft. There is no mass.      Tenderness: There is no abdominal tenderness. There is no guarding or rebound.      Hernia: No hernia is present.   Musculoskeletal:         General: Normal range of motion.      Cervical back: Normal range of motion.   Skin:     General: Skin is warm and dry.      Coloration: Skin is not jaundiced.   Neurological:      Mental Status: She is alert and oriented to person, place, and time. Mental status is at baseline.   Psychiatric:         Mood and Affect: Mood normal.         Behavior: Behavior normal.          Last Vital Signs  /80 (BP Location: Left arm, Patient Position: Sitting, BP Cuff Size: Adult)   Pulse 67   Temp 36.1 °C (97 °F) (Temporal)   Resp 16   Ht 1.791 m (5' 10.5\")   Wt 96.2 kg (212 lb)   SpO2 100%   BMI 29.99 kg/m²      Relevant Results  Lab Results   Component Value Date    WBC 4.0 (L) 01/02/2024    HGB 13.3 01/02/2024    HCT 41.3 01/02/2024    MCV 94 01/02/2024    " " 01/02/2024      Lab Results   Component Value Date    GLUCOSE 80 01/02/2024    CALCIUM 9.5 01/02/2024     01/02/2024    K 3.9 01/02/2024    CO2 25 01/02/2024     (H) 01/02/2024    BUN 8 01/02/2024    CREATININE 0.86 01/02/2024      Lab Results   Component Value Date    ALT 18 01/02/2024    AST 15 01/02/2024    ALKPHOS 104 01/02/2024    BILITOT 0.4 01/02/2024    No results found for: \"IRON\", \"TIBC\", \"IRONSAT\", \"FERRITIN\", \"BHGRLRGJ94\", \"FOLATE\"    Lab Results   Component Value Date    CRP 0.32 11/21/2022    No results found for: \"LIPASE\"       EGD/COLONOSCOPY    EGD 6/9/2021 with Dr. Meyer-Impression:              - Z-line irregular, 40 cm from the incisors. Biopsied.  - Gastritis. Biopsied.  - Normal duodenal bulb and second portion of the duodenum.    A.  STOMACH, BIOPSY:    --GASTRIC ANTRAL/OXYNTIC MUCOSA WITH REACTIVE GASTROPATHY AND FEATURES SUGGESTIVE OF HEALED EROSION/ULCER   --NO HELICOBACTER PYLORI-LIKE ORGANISMS IDENTIFIED ON H&E STAINED SLIDES     B.  GASTROESOPHAGEAL JUNCTION, BIOPSY:    --MILDLY HYPERPLASTIC SQUAMOUS MUCOSA AND CARDIO OXYNTIC MUCOSA   --NO INTESTINAL METAPLASIA OR DYSPLASIA IDENTIFIED   ___________________________________________________________________________    COLONOSCOPY 6/9/2021 with Dr. Meyer- Impression:              - Diverticulosis in the sigmoid colon.  - Internal hemorrhoids.  - The examination was otherwise normal on direct and retroflexion views.  - No specimens collected.      Assessment/Plan   63 y.o. female presenting to GI clinic to establish care for GERD and IBS. She feels that hyosciamine is helpful to keep symptoms controlled but she still has intermittent IBS flares and has difficulty controlling these with just the prescribed Levsin and Pepto-Bismol.  Plan to add Levbid and use Levsin PRN  Continue Nexium BID  Continue dietary and lifestyle interventions, high fiber diet  Follow up in 1 year, sooner if needed    Problem List Items Addressed " This Visit       GERD (gastroesophageal reflux disease)    Relevant Medications    esomeprazole (NexIUM) 40 mg DR capsule    Irritable bowel syndrome with diarrhea - Primary    Relevant Medications    hyoscyamine ER (Levbid) 0.375 mg 12 hr tablet    hyoscyamine (Anaspaz, Levsin) 0.125 mg tablet    Nausea in adult    Relevant Medications    hyoscyamine (Anaspaz, Levsin) 0.125 mg tablet       TWAN Garcia-CNP

## 2024-04-05 ENCOUNTER — OFFICE VISIT (OUTPATIENT)
Dept: GASTROENTEROLOGY | Facility: CLINIC | Age: 64
End: 2024-04-05
Payer: COMMERCIAL

## 2024-04-05 VITALS
RESPIRATION RATE: 16 BRPM | DIASTOLIC BLOOD PRESSURE: 80 MMHG | HEIGHT: 71 IN | BODY MASS INDEX: 29.68 KG/M2 | WEIGHT: 212 LBS | HEART RATE: 67 BPM | OXYGEN SATURATION: 100 % | TEMPERATURE: 97 F | SYSTOLIC BLOOD PRESSURE: 138 MMHG

## 2024-04-05 DIAGNOSIS — K58.0 IRRITABLE BOWEL SYNDROME WITH DIARRHEA: Primary | ICD-10-CM

## 2024-04-05 DIAGNOSIS — K21.9 GASTROESOPHAGEAL REFLUX DISEASE, UNSPECIFIED WHETHER ESOPHAGITIS PRESENT: ICD-10-CM

## 2024-04-05 DIAGNOSIS — R11.0 NAUSEA IN ADULT: ICD-10-CM

## 2024-04-05 PROCEDURE — 99204 OFFICE O/P NEW MOD 45 MIN: CPT

## 2024-04-05 PROCEDURE — 1036F TOBACCO NON-USER: CPT

## 2024-04-05 RX ORDER — CYANOCOBALAMIN (VITAMIN B-12) 500 MCG
1 TABLET ORAL NIGHTLY
COMMUNITY

## 2024-04-05 RX ORDER — HYOSCYAMINE SULFATE 0.38 MG/1
0.38 TABLET, EXTENDED RELEASE ORAL EVERY 12 HOURS
Qty: 60 TABLET | Refills: 2 | Status: SHIPPED | OUTPATIENT
Start: 2024-04-05 | End: 2024-04-16 | Stop reason: WASHOUT

## 2024-04-05 RX ORDER — HYOSCYAMINE SULFATE 0.125 MG
0.12 TABLET ORAL EVERY 6 HOURS PRN
Qty: 120 TABLET | Refills: 0 | Status: SHIPPED | OUTPATIENT
Start: 2024-04-05 | End: 2024-04-16 | Stop reason: SDUPTHER

## 2024-04-05 RX ORDER — MULTIVITAMIN/IRON/FOLIC ACID 18MG-0.4MG
1 TABLET ORAL DAILY
COMMUNITY

## 2024-04-05 RX ORDER — ESOMEPRAZOLE MAGNESIUM 40 MG/1
40 CAPSULE, DELAYED RELEASE ORAL
Qty: 180 CAPSULE | Refills: 1 | Status: SHIPPED | OUTPATIENT
Start: 2024-04-05 | End: 2024-10-02

## 2024-04-05 RX ORDER — BISMUTH SUBSALICYLATE 262 MG/1
524 TABLET ORAL AS NEEDED
COMMUNITY

## 2024-04-05 ASSESSMENT — ENCOUNTER SYMPTOMS
TROUBLE SWALLOWING: 0
BLOOD IN STOOL: 0
ABDOMINAL DISTENTION: 0
ROS GI COMMENTS: SEE HPI
CONSTIPATION: 0
FEVER: 0
UNEXPECTED WEIGHT CHANGE: 0
NAUSEA: 0
DIARRHEA: 0
RECTAL PAIN: 0
ANAL BLEEDING: 0
FATIGUE: 0
CHILLS: 0
VOMITING: 0
ABDOMINAL PAIN: 0
DIAPHORESIS: 0
APPETITE CHANGE: 0

## 2024-04-05 ASSESSMENT — PAIN SCALES - GENERAL: PAINLEVEL: 4

## 2024-04-16 ENCOUNTER — TELEPHONE (OUTPATIENT)
Dept: GASTROENTEROLOGY | Facility: CLINIC | Age: 64
End: 2024-04-16

## 2024-04-16 ENCOUNTER — OFFICE VISIT (OUTPATIENT)
Dept: PRIMARY CARE | Facility: CLINIC | Age: 64
End: 2024-04-16
Payer: COMMERCIAL

## 2024-04-16 VITALS
TEMPERATURE: 98.6 F | DIASTOLIC BLOOD PRESSURE: 72 MMHG | SYSTOLIC BLOOD PRESSURE: 116 MMHG | BODY MASS INDEX: 30.32 KG/M2 | HEIGHT: 70 IN | WEIGHT: 211.8 LBS | OXYGEN SATURATION: 99 % | HEART RATE: 65 BPM

## 2024-04-16 DIAGNOSIS — R11.0 NAUSEA: ICD-10-CM

## 2024-04-16 DIAGNOSIS — Z79.899 CONTROLLED SUBSTANCE AGREEMENT SIGNED: ICD-10-CM

## 2024-04-16 DIAGNOSIS — E78.5 DYSLIPIDEMIA: ICD-10-CM

## 2024-04-16 DIAGNOSIS — M54.81 BILATERAL OCCIPITAL NEURALGIA: ICD-10-CM

## 2024-04-16 DIAGNOSIS — M50.10 CERVICAL DISC DISORDER WITH RADICULOPATHY: Primary | ICD-10-CM

## 2024-04-16 DIAGNOSIS — R42 VERTIGO: ICD-10-CM

## 2024-04-16 DIAGNOSIS — K58.0 IRRITABLE BOWEL SYNDROME WITH DIARRHEA: ICD-10-CM

## 2024-04-16 DIAGNOSIS — D72.829 LEUKOCYTOSIS, UNSPECIFIED TYPE: ICD-10-CM

## 2024-04-16 DIAGNOSIS — G44.86 HEADACHE, CERVICOGENIC: ICD-10-CM

## 2024-04-16 DIAGNOSIS — R11.0 NAUSEA IN ADULT: ICD-10-CM

## 2024-04-16 PROCEDURE — 80358 DRUG SCREENING METHADONE: CPT

## 2024-04-16 PROCEDURE — 82570 ASSAY OF URINE CREATININE: CPT

## 2024-04-16 PROCEDURE — 80354 DRUG SCREENING FENTANYL: CPT

## 2024-04-16 PROCEDURE — 80346 BENZODIAZEPINES1-12: CPT

## 2024-04-16 PROCEDURE — 80307 DRUG TEST PRSMV CHEM ANLYZR: CPT

## 2024-04-16 PROCEDURE — 80361 OPIATES 1 OR MORE: CPT

## 2024-04-16 PROCEDURE — 80368 SEDATIVE HYPNOTICS: CPT

## 2024-04-16 PROCEDURE — 99214 OFFICE O/P EST MOD 30 MIN: CPT | Performed by: PHYSICIAN ASSISTANT

## 2024-04-16 PROCEDURE — 80365 DRUG SCREENING OXYCODONE: CPT

## 2024-04-16 PROCEDURE — 1036F TOBACCO NON-USER: CPT | Performed by: PHYSICIAN ASSISTANT

## 2024-04-16 PROCEDURE — 81003 URINALYSIS AUTO W/O SCOPE: CPT

## 2024-04-16 PROCEDURE — 80373 DRUG SCREENING TRAMADOL: CPT

## 2024-04-16 RX ORDER — IBUPROFEN 800 MG/1
800 TABLET ORAL EVERY 8 HOURS PRN
Qty: 90 TABLET | Refills: 3 | Status: SHIPPED | OUTPATIENT
Start: 2024-04-16

## 2024-04-16 RX ORDER — PREGABALIN 75 MG/1
CAPSULE ORAL
Qty: 112 CAPSULE | Refills: 2 | Status: SHIPPED | OUTPATIENT
Start: 2024-04-16 | End: 2024-07-16

## 2024-04-16 RX ORDER — MECLIZINE HCL 12.5 MG 12.5 MG/1
12.5 TABLET ORAL EVERY 8 HOURS PRN
Qty: 180 TABLET | Refills: 3 | Status: SHIPPED | OUTPATIENT
Start: 2024-04-16 | End: 2025-04-16

## 2024-04-16 RX ORDER — HYOSCYAMINE SULFATE 0.125 MG
0.12 TABLET ORAL 4 TIMES DAILY
Qty: 360 TABLET | Refills: 3 | Status: SHIPPED | OUTPATIENT
Start: 2024-04-16 | End: 2025-04-16

## 2024-04-16 RX ORDER — METHOCARBAMOL 750 MG/1
750 TABLET, FILM COATED ORAL 3 TIMES DAILY PRN
Qty: 270 TABLET | Refills: 3 | Status: SHIPPED | OUTPATIENT
Start: 2024-04-16

## 2024-04-16 RX ORDER — CLONAZEPAM 0.5 MG/1
0.5 TABLET ORAL NIGHTLY
Qty: 30 TABLET | Refills: 2 | Status: SHIPPED | OUTPATIENT
Start: 2024-04-16 | End: 2024-07-15

## 2024-04-16 RX ORDER — PRAVASTATIN SODIUM 10 MG/1
10 TABLET ORAL NIGHTLY
Qty: 90 TABLET | Refills: 3 | Status: SHIPPED | OUTPATIENT
Start: 2024-04-16 | End: 2025-04-16

## 2024-04-16 RX ORDER — ONDANSETRON 4 MG/1
4 TABLET, ORALLY DISINTEGRATING ORAL EVERY 12 HOURS PRN
Qty: 20 TABLET | Refills: 11 | Status: SHIPPED | OUTPATIENT
Start: 2024-04-16 | End: 2024-07-15

## 2024-04-16 ASSESSMENT — ANXIETY QUESTIONNAIRES
7. FEELING AFRAID AS IF SOMETHING AWFUL MIGHT HAPPEN: NOT AT ALL
1. FEELING NERVOUS, ANXIOUS, OR ON EDGE: NOT AT ALL
2. NOT BEING ABLE TO STOP OR CONTROL WORRYING: NOT AT ALL
4. TROUBLE RELAXING: NOT AT ALL
GAD7 TOTAL SCORE: 0
5. BEING SO RESTLESS THAT IT IS HARD TO SIT STILL: NOT AT ALL
3. WORRYING TOO MUCH ABOUT DIFFERENT THINGS: NOT AT ALL
6. BECOMING EASILY ANNOYED OR IRRITABLE: NOT AT ALL

## 2024-04-16 NOTE — PROGRESS NOTES
Subjective     HPI   Lorraine Trivedi is a 63 y.o. year old female patient with presenting to clinic with concern for   Chief Complaint   Patient presents with    Follow-up     3 mth. Recheck WBC.       Lorraine presents to clinic for 3 month follow up.     On Lyrica & Klonopin.   Going to see pain management for injections, but wants to leave meds here through PCP.    Has seen GI for IBS. Did well on Anaspaz, but NOT the extended release form. She is seeing lucia Sosa. And on Xifaxan      OARRS:  Natasha Dunbar PA-C on 4/16/2024  3:04 PM  I have personally reviewed the OARRS report for Lorraine Trivedi. I have considered the risks of abuse, dependence, addiction and diversion    Is the patient prescribed a combination of a benzodiazepine and opioid?  No.    Last Urine Drug Screen / ordered today: No  Recent Results (from the past 8760 hour(s))   OPIATE/OPIOID/BENZO PRESCRIPTION COMPLIANCE    Collection Time: 05/24/23  2:59 PM   Result Value Ref Range    DRUG SCREEN COMMENT URINE SEE BELOW     Creatine, Urine 10.2 (A) mg/dL    Specific Gravity, Urine Drug 1.0020 Valid 1.0020-1.0200    Amphetamine Screen, Urine PRESUMPTIVE NEGATIVE NEGATIVE    Barbiturate Screen, Urine PRESUMPTIVE NEGATIVE NEGATIVE    Cannabinoid Screen, Urine PRESUMPTIVE NEGATIVE NEGATIVE    Cocaine Screen, Urine PRESUMPTIVE NEGATIVE NEGATIVE    PCP Screen, Urine PRESUMPTIVE NEGATIVE NEGATIVE    7-Aminoclonazepam 71 (A) Cutoff <25 ng/mL    Alpha-Hydroxyalprazolam <25 Cutoff <25 ng/mL    Alpha-Hydroxymidazolam <25 Cutoff <25 ng/mL    Alprazolam <25 Cutoff <25 ng/mL    Chlordiazepoxide <25 Cutoff <25 ng/mL    Clonazepam <25 Cutoff <25 ng/mL    Diazepam <25 Cutoff <25 ng/mL    Lorazepam <25 Cutoff <25 ng/mL    Midazolam <25 Cutoff <25 ng/mL    Nordiazepam <25 Cutoff <25 ng/mL    Oxazepam <25 Cutoff <25 ng/mL    Temazepam <25 Cutoff <25 ng/mL    Zolpidem <25 Cutoff <25 ng/mL    Zolpidem Metabolite (ZCA) <25 Cutoff <25 ng/mL    6-Acetylmorphine <25 Cutoff  <25 ng/mL    Codeine <50 Cutoff <50 ng/mL    Hydrocodone <25 Cutoff <25 ng/mL    Hydromorphone <25 Cutoff <25 ng/mL    Morphine Urine <50 Cutoff <50 ng/mL    Norhydrocodone <25 Cutoff <25 ng/mL    Noroxycodone <25 Cutoff <25 ng/mL    Oxycodone <25 Cutoff <25 ng/mL    Oxymorphone <25 Cutoff <25 ng/mL    Tramadol <50 Cutoff <50 ng/mL    O-Desmethyltramadol <50 Cutoff <50 ng/mL    Fentanyl <2.5 Cutoff<2.5 ng/mL    Norfentanyl <2.5 Cutoff<2.5 ng/mL    METHADONE CONFIRMATION,URINE <25 Cutoff <25 ng/mL    EDDP <25 Cutoff <25 ng/mL     Results are as expected.         Controlled Substance Agreement:  Date of the Last Agreement: 4/16/24    Reviewed Controlled Substance Agreement including but not limited to the benefits, risks, and alternatives to treatment with a Controlled Substance medication(s).    Benzodiazepines:  What is the patient's goal of therapy? improvement of debilitating effects of anxiety  Is this being achieved with current treatment? yes    RIO-7:  0 medicated    Activities of Daily Living:   Is your overall impression that this patient is benefiting (symptom reduction outweighs side effects) from benzodiazepine therapy? Yes     1. Physical Functioning: Better  2. Family Relationship: Better  3. Social Relationship: Better  4. Mood: Better  5. Sleep Patterns: Better  6. Overall Function: Better     and Lyrica:  What is the patient's goal of therapy? pain relief, improved mobility and quality of life  Is this being achieved with current treatment? yes    Pain Assessment:  3-4 with 90% reduction in pain.    Activities of Daily Living:  Is your overall impression that this patient is benefiting (symptom reduction outweighs side effects) from Lyrica therapy? Yes     1. Physical Functioning: Better  2. Family Relationship: Better  3. Social Relationship: Better  4. Mood: Better  5. Sleep Patterns: Same  6. Overall Function: Better    Patient Active Problem List   Diagnosis    Abnormal EKG    Occipital neuralgia     Cervical myofascial pain syndrome    Chronic radicular low back pain    Dyslipidemia    GERD (gastroesophageal reflux disease)    History of cold sores    Irritable bowel syndrome with diarrhea    Peroneal tendon tear    Pes cavus, congenital    Simple cyst of kidney    Vertigo    Overweight with body mass index (BMI) of 29 to 29.9 in adult    Nausea in adult    Otalgia of both ears    Migraine without aura and without status migrainosus, not intractable       Past Medical History:   Diagnosis Date    Alkaline phosphatase elevation 01/26/2023    Ankle impingement syndrome 01/26/2023    Ankle pain 01/26/2023    Back pain, thoracic 01/26/2023    DDD (degenerative disc disease), cervical 01/26/2023    GERD (gastroesophageal reflux disease)     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    Irritable bowel syndrome     Occipital neuralgia     Radiculopathy, lumbar region 07/22/2015    Chronic radicular lumbar pain    Seasickness 01/26/2023    Thoracic back pain 01/26/2023    Thrush, oral 01/26/2023      Past Surgical History:   Procedure Laterality Date    EYE SURGERY  04/20/2015    Eye Surgery Lasik    OTHER SURGICAL HISTORY  12/14/2020    Neck surgery - neck fusion    OTHER SURGICAL HISTORY Left 12/14/2020    Foot surgery x3    OTHER SURGICAL HISTORY  12/18/2018    Ankle surgery    TUBAL LIGATION  04/20/2015    Tubal Ligation      Family History   Problem Relation Name Age of Onset    Diabetes Mother      Breast cancer Mother          mastectomy    Stroke Mother          x2    Cancer Father Pete     Alcohol abuse Father Pete     Stomach cancer Father Pete     Heart attack Brother  50    Heart attack Mother's Brother      Heart attack Maternal Grandfather      Other (pacemaker) Maternal Grandfather      Heart failure Other Grandmother     Heart failure Other grandparent       Social History     Tobacco Use    Smoking status: Former     Current packs/day: 0.00     Average packs/day: 1 pack/day for 35.0 years (35.0 ttl pk-yrs)     Types:  Cigarettes     Start date: 7/15/1980     Quit date: 7/15/2015     Years since quittin.7    Smokeless tobacco: Former   Substance Use Topics    Alcohol use: Not Currently        Current Outpatient Medications:     b complex 0.4 mg tablet, Take 1 tablet by mouth once daily., Disp: , Rfl:     bismuth subsalicylate (Pepto Bismol) 262 mg chewable tablet, Chew 2 tablets (524 mg) if needed for indigestion., Disp: , Rfl:     esomeprazole (NexIUM) 40 mg DR capsule, Take 1 capsule (40 mg) by mouth 2 times a day before meals. Do not open capsule., Disp: 180 capsule, Rfl: 1    fluticasone (Flonase) 50 mcg/actuation nasal spray, Administer 1 spray into each nostril once daily. Shake gently. Before first use, prime pump. After use, clean tip and replace cap., Disp: 16 g, Rfl: 5    hyoscyamine (Anaspaz, Levsin) 0.125 mg tablet, Take 1 tablet (0.125 mg) by mouth 4 times a day., Disp: 360 tablet, Rfl: 3    melatonin 1 mg tablet, Take 1 tablet (1 mg) by mouth once daily at bedtime., Disp: , Rfl:     promethazine (Phenergan) 12.5 mg tablet, Take 1 tablet (12.5 mg) by mouth every 8 hours if needed for nausea or vomiting., Disp: 90 tablet, Rfl: 3    rifAXIMin (Xifaxan) 550 mg tablet, Take 1 tablet (550 mg) by mouth 3 times a day., Disp: , Rfl:     rizatriptan MLT (Maxalt-MLT) 10 mg disintegrating tablet, Take 1 tablet (10 mg) by mouth 1 time if needed for migraine. May repeat in 2 hours if unresolved. Do not exceed 30 mg in 24 hours., Disp: 30 tablet, Rfl: 11    topiramate (Topamax) 100 mg tablet, Take 1 tablet (100 mg) by mouth once daily., Disp: 90 tablet, Rfl: 3    clonazePAM (KlonoPIN) 0.5 mg tablet, Take 1 tablet (0.5 mg) by mouth once daily at bedtime. To help sleep with pain and anxiety, Disp: 30 tablet, Rfl: 2    ibuprofen (IBU) 800 mg tablet, Take 1 tablet (800 mg) by mouth every 8 hours if needed for moderate pain (4 - 6)., Disp: 90 tablet, Rfl: 3    meclizine (Antivert) 12.5 mg tablet, Take 1 tablet (12.5 mg) by mouth  "every 8 hours if needed (For Vertigo)., Disp: 180 tablet, Rfl: 3    methocarbamol (Robaxin) 750 mg tablet, Take 1 tablet (750 mg) by mouth 3 times a day as needed for muscle spasms. PRN, Disp: 270 tablet, Rfl: 3    ondansetron ODT (Zofran-ODT) 4 mg disintegrating tablet, Take 1 tablet (4 mg) by mouth every 12 hours if needed for nausea or vomiting., Disp: 20 tablet, Rfl: 11    pravastatin (Pravachol) 10 mg tablet, Take 1 tablet (10 mg) by mouth once daily at bedtime., Disp: 90 tablet, Rfl: 3    pregabalin (Lyrica) 75 mg capsule, 1 capsule PO in the morning, 1 capsule in the afternoon, 2 capsules a bedtime, Disp: 112 capsule, Rfl: 2     Review of Systems  Constitutional: Denies fever  HEENT: Denies ST, earache  CVS: Denies Chest pain  Pulmonary: Denies wheezing, SOB  GI: Denies N/V  : Denies dysuria  Musculoskeletal:  Denies myalgia  Neuro: Denies focal weakness or numbness.  Skin: Denies Rashes.  *Review of Systems is negative unless otherwise mentioned in HPI or ROS above.    Objective   /72   Pulse 65   Temp 37 °C (98.6 °F)   Ht 1.778 m (5' 10\")   Wt 96.1 kg (211 lb 12.8 oz)   SpO2 99%   BMI 30.39 kg/m²  reviewed Body mass index is 30.39 kg/m².     Physical Exam  Constitutional: NAD.  Resting comfortably.  Head: Atraumatic, normocephalic.  ENT: Moist oral mucosa. Nasal mucosa wnl.   Cardiac: Regular rate & rhythm.   Pulmonary: Lungs clear bilat  GI: Soft, Nontender, nondistended.   Musculoskeletal: No peripheral edema.   Skin: No evidence of trauma. No rashes  Psych: Intact judgement and insight.    .Assessment/Plan   Problem List Items Addressed This Visit             ICD-10-CM    Occipital neuralgia M54.81    Relevant Medications    clonazePAM (KlonoPIN) 0.5 mg tablet    pregabalin (Lyrica) 75 mg capsule    Dyslipidemia E78.5    Relevant Medications    pravastatin (Pravachol) 10 mg tablet    Vertigo R42    Relevant Medications    meclizine (Antivert) 12.5 mg tablet     Other Visit Diagnoses         " Codes    Cervical disc disorder with radiculopathy    -  Primary M50.10    Relevant Medications    pregabalin (Lyrica) 75 mg capsule    Headache, cervicogenic     G44.86    Relevant Medications    ibuprofen (IBU) 800 mg tablet    methocarbamol (Robaxin) 750 mg tablet    Nausea     R11.0    Relevant Medications    ondansetron ODT (Zofran-ODT) 4 mg disintegrating tablet    Leukocytosis, unspecified type     D72.829    Relevant Orders    CBC

## 2024-04-17 LAB
AMPHETAMINES UR QL SCN: ABNORMAL
BARBITURATES UR QL SCN: ABNORMAL
BZE UR QL SCN: ABNORMAL
CANNABINOIDS UR QL SCN: ABNORMAL
CREAT UR-MCNC: 11 MG/DL (ref 20–320)
PCP UR QL SCN: ABNORMAL
SP GR UR STRIP.AUTO: 1

## 2024-04-18 ENCOUNTER — LAB (OUTPATIENT)
Dept: LAB | Facility: LAB | Age: 64
End: 2024-04-18
Payer: COMMERCIAL

## 2024-04-18 DIAGNOSIS — D72.829 LEUKOCYTOSIS, UNSPECIFIED TYPE: ICD-10-CM

## 2024-04-18 LAB
ERYTHROCYTE [DISTWIDTH] IN BLOOD BY AUTOMATED COUNT: 12.8 % (ref 11.5–14.5)
HCT VFR BLD AUTO: 40 % (ref 36–46)
HGB BLD-MCNC: 13 G/DL (ref 12–16)
MCH RBC QN AUTO: 30.3 PG (ref 26–34)
MCHC RBC AUTO-ENTMCNC: 32.5 G/DL (ref 32–36)
MCV RBC AUTO: 93 FL (ref 80–100)
NRBC BLD-RTO: 0 /100 WBCS (ref 0–0)
PLATELET # BLD AUTO: 251 X10*3/UL (ref 150–450)
RBC # BLD AUTO: 4.29 X10*6/UL (ref 4–5.2)
WBC # BLD AUTO: 5.4 X10*3/UL (ref 4.4–11.3)

## 2024-04-18 PROCEDURE — 36415 COLL VENOUS BLD VENIPUNCTURE: CPT

## 2024-04-19 LAB
1OH-MIDAZOLAM UR CFM-MCNC: <25 NG/ML
6MAM UR CFM-MCNC: <25 NG/ML
7AMINOCLONAZEPAM UR CFM-MCNC: 52 NG/ML
A-OH ALPRAZ UR CFM-MCNC: <25 NG/ML
ALPRAZ UR CFM-MCNC: <25 NG/ML
CHLORDIAZEP UR CFM-MCNC: <25 NG/ML
CLONAZEPAM UR CFM-MCNC: <25 NG/ML
CODEINE UR CFM-MCNC: <50 NG/ML
DIAZEPAM UR CFM-MCNC: <25 NG/ML
EDDP UR CFM-MCNC: <25 NG/ML
FENTANYL UR CFM-MCNC: <2.5 NG/ML
HYDROCODONE CTO UR CFM-MCNC: <25 NG/ML
HYDROMORPHONE UR CFM-MCNC: <25 NG/ML
LORAZEPAM UR CFM-MCNC: <25 NG/ML
METHADONE UR CFM-MCNC: <25 NG/ML
MIDAZOLAM UR CFM-MCNC: <25 NG/ML
MORPHINE UR CFM-MCNC: <50 NG/ML
NORDIAZEPAM UR CFM-MCNC: <25 NG/ML
NORFENTANYL UR CFM-MCNC: <2.5 NG/ML
NORHYDROCODONE UR CFM-MCNC: <25 NG/ML
NOROXYCODONE UR CFM-MCNC: <25 NG/ML
NORTRAMADOL UR-MCNC: <50 NG/ML
OXAZEPAM UR CFM-MCNC: <25 NG/ML
OXYCODONE UR CFM-MCNC: <25 NG/ML
OXYMORPHONE UR CFM-MCNC: <25 NG/ML
TEMAZEPAM UR CFM-MCNC: <25 NG/ML
TRAMADOL UR CFM-MCNC: <50 NG/ML
ZOLPIDEM UR CFM-MCNC: <25 NG/ML
ZOLPIDEM UR-MCNC: <25 NG/ML

## 2024-05-07 ENCOUNTER — APPOINTMENT (OUTPATIENT)
Dept: PODIATRY | Facility: CLINIC | Age: 64
End: 2024-05-07
Payer: COMMERCIAL

## 2024-05-17 NOTE — PATIENT INSTRUCTIONS
Start hyoscyamine long acting twice daily   Use short acting hyoscyamine 4 times daily as needed for flares  Continue Nexium twice daily, 30 mins before breakfast and dinner  Follow up in 1 year and as needed  
Detail Level: Simple
Render Risk Assessment In Note?: no
Additional Notes: 22131/64438/L72.0\\nExcision scheduled for 5/23

## 2024-05-24 ENCOUNTER — OFFICE VISIT (OUTPATIENT)
Dept: PAIN MEDICINE | Facility: HOSPITAL | Age: 64
End: 2024-05-24
Payer: COMMERCIAL

## 2024-05-24 VITALS
HEIGHT: 71 IN | RESPIRATION RATE: 16 BRPM | TEMPERATURE: 98 F | SYSTOLIC BLOOD PRESSURE: 127 MMHG | HEART RATE: 75 BPM | WEIGHT: 209 LBS | DIASTOLIC BLOOD PRESSURE: 81 MMHG | BODY MASS INDEX: 29.26 KG/M2

## 2024-05-24 DIAGNOSIS — M48.062 LUMBAR STENOSIS WITH NEUROGENIC CLAUDICATION: ICD-10-CM

## 2024-05-24 DIAGNOSIS — M54.16 CHRONIC LUMBAR RADICULOPATHY: Primary | ICD-10-CM

## 2024-05-24 DIAGNOSIS — M47.817 FACET ARTHROPATHY, LUMBOSACRAL: ICD-10-CM

## 2024-05-24 DIAGNOSIS — M54.12 CERVICAL RADICULOPATHY: ICD-10-CM

## 2024-05-24 DIAGNOSIS — Z79.899 MEDICATION MANAGEMENT: ICD-10-CM

## 2024-05-24 DIAGNOSIS — M96.1 POSTLAMINECTOMY SYNDROME, CERVICAL REGION: ICD-10-CM

## 2024-05-24 DIAGNOSIS — M47.812 FACET ARTHROPATHY, CERVICAL: ICD-10-CM

## 2024-05-24 LAB
AMPHETAMINES UR QL SCN: ABNORMAL
BARBITURATES UR QL SCN: ABNORMAL
BZE UR QL SCN: ABNORMAL
CANNABINOIDS UR QL SCN: ABNORMAL
CREAT UR-MCNC: 16.3 MG/DL (ref 20–320)
PCP UR QL SCN: ABNORMAL

## 2024-05-24 PROCEDURE — 81003 URINALYSIS AUTO W/O SCOPE: CPT | Mod: 59 | Performed by: NURSE PRACTITIONER

## 2024-05-24 PROCEDURE — 80307 DRUG TEST PRSMV CHEM ANLYZR: CPT | Performed by: NURSE PRACTITIONER

## 2024-05-24 PROCEDURE — 99214 OFFICE O/P EST MOD 30 MIN: CPT | Performed by: NURSE PRACTITIONER

## 2024-05-24 PROCEDURE — 80346 BENZODIAZEPINES1-12: CPT | Performed by: NURSE PRACTITIONER

## 2024-05-24 ASSESSMENT — ENCOUNTER SYMPTOMS
NECK PAIN: 0
RESPIRATORY NEGATIVE: 1
ARTHRALGIAS: 1
JOINT SWELLING: 0
CONSTITUTIONAL NEGATIVE: 1
ALLERGIC/IMMUNOLOGIC NEGATIVE: 1
BACK PAIN: 1
NEUROLOGICAL NEGATIVE: 1
MYALGIAS: 1
ENDOCRINE NEGATIVE: 1
NECK STIFFNESS: 0
CARDIOVASCULAR NEGATIVE: 1
GASTROINTESTINAL NEGATIVE: 1
EYES NEGATIVE: 1
PSYCHIATRIC NEGATIVE: 1

## 2024-05-24 ASSESSMENT — PAIN SCALES - GENERAL: PAINLEVEL: 5

## 2024-05-24 NOTE — PATIENT INSTRUCTIONS
Continue with your prescribed medications.  Do not take sedating medications together.    ---------------    Your next appointment is with Dr. Acosta in:    [facility]    For pain block/injection on: 6/14/2024, lumbar epidural block #1 at L5-S1    SEDATION: You must NOT eat or drink 6 hours before the procedure and you must have a  with you to take you home if planning to have a sedation with your block.    No ibuprofen on this day.    °You will receive a phone call the weekday before your appointment/procedure.   °Please KEEP your scheduled appointments.     °BRING your photo ID, insurance information, and a correct list of your home medications to EVERY visit.    °Please call our office at [phone] if you have any questions.     You will then be seen for a postprocedure follow-up in 6 to 8 weeks in [place].    ---------------

## 2024-05-24 NOTE — PROGRESS NOTES
Subjective   Patient ID: Lorraine Trivedi is a 64 y.o. female who presents for       Lorraine is a 64-year-old  female who is here to establish care with pain management.  Patient is ambulatory.  Gait is steady.  She arrives by herself.  She was a patient of Dr. Ely before she retired.  Patient has not seen pain management since then.    Patient has chronic pain to her neck and to her lower back for several years.  She rates her pain as 5 out of 10.  Pain is constant.  She describes it as aching, cramping, sharp, shooting, spastic, stabbing, tightness and throbbing kind of pain.  She has numbness, stinging and pins and needle sensation to her arms and also to her legs.  Patient reports that bringing her head up and down causes more pain than turning her neck which is not as bad.  She has numbness and tingling sensation to her arms down to her hands.  She has some weakness on occasion and is dropping objects.  She has pain to her back that goes down to her legs.  She has numbness and tingling sensation more so at the back of her leg that happens for no reason.  She denies leg weakness or change in balance.  She denies bowel or bladder incontinence.  She denies recent falls, injuries or inciting events.  She denies joint replacement.    Patient has history of cervical fusion which was done on August 2022 by Dr. Hartman, [facility].  She had epidural injections in the past from Dr. Ely that worked well.  She is also getting Botox injection at [facility] for her occipital neuralgia.  She reports she has an upcoming procedure and is getting 30 injections.    Patient is taking pregabalin prescribed by her PCP and she wants to keep it that way.  She takes ibuprofen on occasion.  She denies side effects to her medications.  She had physical therapy in the past and is continuing her home stretching exercise with no relief.    Patient had MRI of her lumbar spine that was on 12/1/2020 and CT of her cervical spine  that was done on 12/22/2022.  I reviewed the results.  I also reviewed previous notes from her other providers.  I discussed the plan of care including pharmacologic and joint interventional procedure.  Patient would like her lower back addressed first.  I discussed lumbar epidural block and she is in agreement to proceed to this procedure.  This is done under fluoroscopy.  I also discussed facet injection and she reports that it did not work in the past.  Questions were answered during this encounter.          OARRS:  Antonieta Savage, APRN-CNP, DNP on 5/24/2024 10:28 AM  I have personally reviewed the OARRS report for Lorraine Trivedi. 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    Surgical History  Past Surgical History:   Procedure Laterality Date    EYE SURGERY  04/20/2015    Eye Surgery Lasik    OTHER SURGICAL HISTORY  12/14/2020    Neck surgery - neck fusion    OTHER SURGICAL HISTORY Left 12/14/2020    Foot surgery x3    OTHER SURGICAL HISTORY  12/18/2018    Ankle surgery    TUBAL LIGATION  04/20/2015    Tubal Ligation        Social History  She reports that she quit smoking about 8 years ago. Her smoking use included cigarettes. She started smoking about 43 years ago. She has a 35 pack-year smoking history. She has quit using smokeless tobacco. She reports that she does not drink alcohol and does not use drugs.    Family History  Family History   Problem Relation Name Age of Onset     Diabetes Mother      Breast cancer Mother          mastectomy    Stroke Mother          x2    Cancer Father Pete     Alcohol abuse Father Pete     Stomach cancer Father Pete     Heart attack Brother  50    Heart attack Mother's Brother      Heart attack Maternal Grandfather      Other (pacemaker) Maternal Grandfather      Heart failure Other Grandmother     Heart failure Other grandparent         Allergies  Adhesive tape-silicones      Current Outpatient Medications:     b complex 0.4 mg tablet, Take 1 tablet by mouth once daily., Disp: , Rfl:     bismuth subsalicylate (Pepto Bismol) 262 mg chewable tablet, Chew 2 tablets (524 mg) if needed for indigestion., Disp: , Rfl:     clonazePAM (KlonoPIN) 0.5 mg tablet, Take 1 tablet (0.5 mg) by mouth once daily at bedtime. To help sleep with pain and anxiety, Disp: 30 tablet, Rfl: 2    esomeprazole (NexIUM) 40 mg DR capsule, Take 1 capsule (40 mg) by mouth 2 times a day before meals. Do not open capsule., Disp: 180 capsule, Rfl: 1    fluticasone (Flonase) 50 mcg/actuation nasal spray, Administer 1 spray into each nostril once daily. Shake gently. Before first use, prime pump. After use, clean tip and replace cap., Disp: 16 g, Rfl: 5    hyoscyamine (Anaspaz, Levsin) 0.125 mg tablet, Take 1 tablet (0.125 mg) by mouth 4 times a day., Disp: 360 tablet, Rfl: 3    ibuprofen (IBU) 800 mg tablet, Take 1 tablet (800 mg) by mouth every 8 hours if needed for moderate pain (4 - 6)., Disp: 90 tablet, Rfl: 3    meclizine (Antivert) 12.5 mg tablet, Take 1 tablet (12.5 mg) by mouth every 8 hours if needed (For Vertigo)., Disp: 180 tablet, Rfl: 3    melatonin 1 mg tablet, Take 1 tablet (1 mg) by mouth once daily at bedtime., Disp: , Rfl:     methocarbamol (Robaxin) 750 mg tablet, Take 1 tablet (750 mg) by mouth 3 times a day as needed for muscle spasms. PRN, Disp: 270 tablet, Rfl: 3    ondansetron ODT (Zofran-ODT) 4 mg disintegrating tablet, Take 1 tablet (4 mg) by mouth every 12 hours if  needed for nausea or vomiting., Disp: 20 tablet, Rfl: 11    pravastatin (Pravachol) 10 mg tablet, Take 1 tablet (10 mg) by mouth once daily at bedtime., Disp: 90 tablet, Rfl: 3    pregabalin (Lyrica) 75 mg capsule, 1 capsule PO in the morning, 1 capsule in the afternoon, 2 capsules a bedtime, Disp: 112 capsule, Rfl: 2    promethazine (Phenergan) 12.5 mg tablet, Take 1 tablet (12.5 mg) by mouth every 8 hours if needed for nausea or vomiting., Disp: 90 tablet, Rfl: 3    rifAXIMin (Xifaxan) 550 mg tablet, Take 1 tablet (550 mg) by mouth 3 times a day., Disp: , Rfl:     rizatriptan MLT (Maxalt-MLT) 10 mg disintegrating tablet, Take 1 tablet (10 mg) by mouth 1 time if needed for migraine. May repeat in 2 hours if unresolved. Do not exceed 30 mg in 24 hours., Disp: 30 tablet, Rfl: 11    topiramate (Topamax) 100 mg tablet, Take 1 tablet (100 mg) by mouth once daily., Disp: 90 tablet, Rfl: 3     Review of Systems   Constitutional: Negative.    HENT: Negative.     Eyes: Negative.    Respiratory: Negative.     Cardiovascular: Negative.    Gastrointestinal: Negative.    Endocrine: Negative.    Genitourinary: Negative.    Musculoskeletal:  Positive for arthralgias, back pain and myalgias. Negative for gait problem, joint swelling, neck pain and neck stiffness.   Skin: Negative.    Allergic/Immunologic: Negative.    Neurological: Negative.    Psychiatric/Behavioral: Negative.          Physical Exam  Vitals and nursing note reviewed.   HENT:      Head: Normocephalic.      Nose: Nose normal.   Eyes:      Extraocular Movements: Extraocular movements intact.      Conjunctiva/sclera: Conjunctivae normal.      Pupils: Pupils are equal, round, and reactive to light.   Cardiovascular:      Rate and Rhythm: Normal rate and regular rhythm.   Pulmonary:      Effort: Pulmonary effort is normal.      Breath sounds: Normal breath sounds.   Musculoskeletal:         General: Tenderness present. No swelling, deformity or signs of injury.       Cervical back: No rigidity or tenderness.      Lumbar back: Tenderness present.      Right lower leg: No edema.      Left lower leg: No edema.      Comments: No neck rigidity.  Limited range of motion with flexion due to pain.  Positive for paraspinal tenderness at the cervical region bilaterally at C5-C6, C6-C7.  With radicular symptoms that follows this pathway.  Presence of scar at the anterior spine from previous surgery.  Positive for minimal paraspinal tenderness throughout the thoracic region.  Positive for paraspinal tenderness at the lumbar region bilaterally at L4-L5, L5-S1 with rotation.  With radicular symptoms that follows L5-S1 distribution.  Positive for bilateral SI joint pain on palpation.  Positive leg raise eliciting back pain.  BUE 4-5/5, BLE 4-5/5.   Skin:     General: Skin is warm and dry.   Neurological:      General: No focal deficit present.      Mental Status: She is alert and oriented to person, place, and time.   Psychiatric:         Mood and Affect: Mood normal.         Behavior: Behavior normal.          Last Recorded Vitals  /81   Pulse 75   Temp 36.7 °C (98 °F) (Temporal)   Resp 16   Wt 94.8 kg (209 lb)     Relevant Results      Pain Management Panel  More data may exist         Latest Ref Rng & Units 4/16/2024 5/24/2023   Pain Management Panel   Amphetamine Screen, Urine Presumptive Negative Presumptive Negative  PRESUMPTIVE NEGATIVE    Barbiturate Screen, Urine Presumptive Negative Presumptive Negative  PRESUMPTIVE NEGATIVE    Codeine IgE <50 ng/mL <50  <50    Hydromorphone Urine <25 ng/mL <25  <25    Morphine  <50 ng/mL <50  <50         --------------  Narrative & Impression   MRN: [id number]  Patient Name: MOISES EPPS     STUDY:  MRI L-SPINE WO; ;  12/1/2020 12:19 pm     INDICATION:  M51.26 Other intervertebral disc displacement, lumbar region low back  pain.     COMPARISON:  MRI lumbar spine from 08/30/2017.     ACCESSION NUMBER(S):  [id number]     ORDERING  CLINICIAN:  HOLLY STEWART     TECHNIQUE:  MRI of the lumbar spine was performed with acquisition of sagittal  T2, sagittal T1, sagittal STIR, axial T1, and axial T2 weighted  sequences.     FINDINGS:  This report assumes 5 non-rib bearing lumbar vertebral bodies. The  lowest intervertebral disc will be labeled L5-S1.     There is mild levoscoliosis. There is stable mild retrolisthesis at  L2-L3, L3-L4, L4-L5, and L5-S1, unchanged. Vertebral body heights are  maintained. There is stable mild intervertebral disc height narrowing  at L2-L3, L3-L4, and L4-L5 with chronic degenerative endplate  changes. There is disc desiccation at multiple levels. Marrow signal  is overall within normal limits.     The conus medullaris terminates at the level of L2-L3 and is  unremarkable in appearance.     At T12-L1, there is no posterior disc contour abnormality. There is  no spinal canal stenosis or neural foraminal narrowing. There is no  facet osteoarthropathy.     At L1-L2, there is no posterior disc contour abnormality. There is no  spinal canal stenosis or neural foraminal narrowing. There is no  facet osteoarthropathy.     At L2-L3, there is a small diffuse disc bulge with osteophytic  spurring. There is no spinal canal stenosis or neural foraminal  narrowing. There is no facet osteoarthropathy.     At L3-L4, there is a small diffuse disc bulge with osteophytic  spurring. There is no spinal canal stenosis or neural foraminal  narrowing. There is no facet osteoarthropathy.     At L4-L5, there is a small diffuse disc bulge which partially effaces  the ventral thecal sac. There is a small annular fissure within the  left lateral portion of the disc located adjacent to the neural  foramen. There is no neural foraminal narrowing or facet  osteoarthropathy.     At L5-S1, there is a stable left lateral disc protrusion which  contains an annular fissure and slightly narrows the left lateral  recess and may abut the traversing portion of  the left S1 nerve root.  There is no significant neural foraminal narrowing. There is no  significant facet osteoarthropathy.     There is a small cyst in the right kidney.     IMPRESSION:  Stable multilevel degenerative changes of the lumbar spine compared  to the MRI from 08/30/2017 as detailed above including a left lateral  disc protrusion at L5-S1 which may abut the traversing left S1 nerve  root.      -------------      Assessment/Plan   Problem List Items Addressed This Visit             ICD-10-CM    Chronic lumbar radiculopathy - Primary M54.16    Relevant Orders    FL fluoro images no charge    Epidural Steroid Injection    Lumbar stenosis with neurogenic claudication M48.062    Relevant Orders    FL fluoro images no charge    Epidural Steroid Injection    Facet arthropathy, cervical M47.812    Cervical radiculopathy M54.12    Postlaminectomy syndrome, cervical region M96.1     Other Visit Diagnoses         Codes    Medication management     Z79.899    Relevant Orders    Opiate/Opioid/Benzo Prescription Compliance    OOB Internal Tracking                   1. Chronic lumbar radiculopathy  - FL fluoro images no charge; Future  - Epidural Steroid Injection; Future  -Continue with your prescribed medications    2. Lumbar stenosis with neurogenic claudication  - FL fluoro images no charge; Future  - Epidural Steroid Injection; Future  -Continue with your prescribed medications    3. Facet arthropathy, lumbosacral  -Continue with your prescribed medications    4. Facet arthropathy, cervical  -Continue with your prescribed medications    5. Cervical radiculopathy  -Continue with your prescribed medications    6. Postlaminectomy syndrome, cervical region  -Continue with your prescribed medications    7. Medication management  - Opiate/Opioid/Benzo Prescription Compliance; Future  - Opiate/Opioid/Benzo Prescription Compliance  - OOB Internal Tracking       Plan/Follow-up Instructions:     Continue with your  prescribed medications.  Do not take sedating medications together.    ---------------    Your next appointment is with Dr. Acosta in:    [facility]    For pain block/injection on: 6/14/2024, lumbar epidural block #1 at L5-S1    SEDATION: You must NOT eat or drink 6 hours before the procedure and you must have a  with you to take you home if planning to have a sedation with your block.    No ibuprofen on this day.    °You will receive a phone call the weekday before your appointment/procedure.   °Please KEEP your scheduled appointments.     °BRING your photo ID, insurance information, and a correct list of your home medications to EVERY visit.    °Please call our office at [phone] if you have any questions.     You will then be seen for a postprocedure follow-up in 6 to 8 weeks in [place].    ---------------        Disclaimer: This note was created using voice recognition software. It was not corrected for typographical or grammatical errors, inadvertent word insertion, or any unintended errors. Please feel free to contact me for clarification.      Time     Prep time on date of the patient encounter: 5  Time spent directly with patient/family/caregiver: 35   Documentation time: 5  Total time on date of patient encounter: 45        Antonieta Savage DNP, APRN, FNP-C    [facility] [facility]  Office #: [phone]  Fax # [phone]

## 2024-05-24 NOTE — PROGRESS NOTES
Last urine drug screening date/ordered today: 05/24/24      Opioid Risk Screening:   THE OPIOID RISK TOOL (ORT)                                                                      Female                     Male     1. Family History of Substance Abuse:                              Alcohol                                                   [1]=  1                         [3]  =     Illicit Drugs                                             [2] = 0                         [3]   =    Prescription Drugs                                [4]=   0                        [4]   =    2. Personal History of Substance Abuse:     Alcohol                                                    [3] =  0                        [3] =     Illegal Drugs                                           [4] = 0                          [4]  =     Prescription Drugs                                [5]= 0                           [5]   =    3. Age (If between 16 to 45):               [1]=    0                       [1]   =    4. History of Preadolescent Sexual Abuse                                                                  [3]=0                            [0]   =    5. Psychological Disease:    ADD, OCD, Bipolar, Schizophrenia    [2]= 0                          [2]   =    Depression                                          [1]=   0                          [1]   =      TOTAL Score =  1     Last opioid risk screening date/ordered today: 5/24/2024  Patient's total score is 1    Reference :  Low Score = 0 to 3  Moderate Score = 4 to 7  High Score = =8       Pain Scale Screening:   Pain Assessment and Documentation Tool (PADT)   Date of Assessment: 5/24/2024  Analgesia:   Patient reports her pain level on average during the past week is 4on a 0 - 10 scale.   Patient reports that her pain level at its worst during the past week was 8 on a 0 -10 scale.   -----------------------

## 2024-05-25 LAB — SP GR UR STRIP.AUTO: 1

## 2024-05-28 ENCOUNTER — HOSPITAL ENCOUNTER (OUTPATIENT)
Dept: RADIOLOGY | Facility: HOSPITAL | Age: 64
Discharge: HOME | End: 2024-05-28
Payer: COMMERCIAL

## 2024-05-28 DIAGNOSIS — M25.562 PAIN IN LEFT KNEE: ICD-10-CM

## 2024-05-28 PROCEDURE — 73721 MRI JNT OF LWR EXTRE W/O DYE: CPT | Mod: LEFT SIDE | Performed by: RADIOLOGY

## 2024-05-28 PROCEDURE — 73721 MRI JNT OF LWR EXTRE W/O DYE: CPT | Mod: LT

## 2024-05-31 LAB
1OH-MIDAZOLAM UR CFM-MCNC: <25 NG/ML
6MAM UR CFM-MCNC: <25 NG/ML
7AMINOCLONAZEPAM UR CFM-MCNC: 70 NG/ML
A-OH ALPRAZ UR CFM-MCNC: <25 NG/ML
ALPRAZ UR CFM-MCNC: <25 NG/ML
CHLORDIAZEP UR CFM-MCNC: <25 NG/ML
CLONAZEPAM UR CFM-MCNC: <25 NG/ML
CODEINE UR CFM-MCNC: <50 NG/ML
DIAZEPAM UR CFM-MCNC: <25 NG/ML
EDDP UR CFM-MCNC: <25 NG/ML
FENTANYL UR CFM-MCNC: <2.5 NG/ML
HYDROCODONE CTO UR CFM-MCNC: <25 NG/ML
HYDROMORPHONE UR CFM-MCNC: <25 NG/ML
LORAZEPAM UR CFM-MCNC: <25 NG/ML
METHADONE UR CFM-MCNC: <25 NG/ML
MIDAZOLAM UR CFM-MCNC: <25 NG/ML
MORPHINE UR CFM-MCNC: <50 NG/ML
NORDIAZEPAM UR CFM-MCNC: <25 NG/ML
NORFENTANYL UR CFM-MCNC: <2.5 NG/ML
NORHYDROCODONE UR CFM-MCNC: <25 NG/ML
NOROXYCODONE UR CFM-MCNC: <25 NG/ML
NORTRAMADOL UR-MCNC: <50 NG/ML
OXAZEPAM UR CFM-MCNC: <25 NG/ML
OXYCODONE UR CFM-MCNC: <25 NG/ML
OXYMORPHONE UR CFM-MCNC: <25 NG/ML
TEMAZEPAM UR CFM-MCNC: <25 NG/ML
TRAMADOL UR CFM-MCNC: <50 NG/ML
ZOLPIDEM UR CFM-MCNC: <25 NG/ML
ZOLPIDEM UR-MCNC: <25 NG/ML

## 2024-06-14 ENCOUNTER — HOSPITAL ENCOUNTER (OUTPATIENT)
Dept: PAIN MEDICINE | Facility: HOSPITAL | Age: 64
Discharge: HOME | End: 2024-06-14
Payer: COMMERCIAL

## 2024-06-14 ENCOUNTER — HOSPITAL ENCOUNTER (OUTPATIENT)
Dept: RADIOLOGY | Facility: HOSPITAL | Age: 64
Discharge: HOME | End: 2024-06-14
Payer: COMMERCIAL

## 2024-06-14 VITALS
BODY MASS INDEX: 29.63 KG/M2 | SYSTOLIC BLOOD PRESSURE: 105 MMHG | WEIGHT: 207 LBS | TEMPERATURE: 97 F | RESPIRATION RATE: 18 BRPM | HEART RATE: 71 BPM | OXYGEN SATURATION: 97 % | DIASTOLIC BLOOD PRESSURE: 86 MMHG | HEIGHT: 70 IN

## 2024-06-14 DIAGNOSIS — M48.062 LUMBAR STENOSIS WITH NEUROGENIC CLAUDICATION: ICD-10-CM

## 2024-06-14 DIAGNOSIS — M54.16 CHRONIC LUMBAR RADICULOPATHY: ICD-10-CM

## 2024-06-14 PROCEDURE — 2550000001 HC RX 255 CONTRASTS: Performed by: ANESTHESIOLOGY

## 2024-06-14 PROCEDURE — 2500000004 HC RX 250 GENERAL PHARMACY W/ HCPCS (ALT 636 FOR OP/ED): Performed by: ANESTHESIOLOGY

## 2024-06-14 PROCEDURE — 62323 NJX INTERLAMINAR LMBR/SAC: CPT | Performed by: ANESTHESIOLOGY

## 2024-06-14 PROCEDURE — 2500000004 HC RX 250 GENERAL PHARMACY W/ HCPCS (ALT 636 FOR OP/ED): Performed by: NURSE PRACTITIONER

## 2024-06-14 RX ORDER — SODIUM CHLORIDE, SODIUM LACTATE, POTASSIUM CHLORIDE, CALCIUM CHLORIDE 600; 310; 30; 20 MG/100ML; MG/100ML; MG/100ML; MG/100ML
100 INJECTION, SOLUTION INTRAVENOUS CONTINUOUS
Status: DISCONTINUED | OUTPATIENT
Start: 2024-06-14 | End: 2024-06-15 | Stop reason: HOSPADM

## 2024-06-14 RX ORDER — ONDANSETRON HYDROCHLORIDE 2 MG/ML
INJECTION, SOLUTION INTRAVENOUS AS NEEDED
Status: COMPLETED | OUTPATIENT
Start: 2024-06-14 | End: 2024-06-14

## 2024-06-14 RX ORDER — MIDAZOLAM HYDROCHLORIDE 1 MG/ML
INJECTION, SOLUTION INTRAMUSCULAR; INTRAVENOUS AS NEEDED
Status: COMPLETED | OUTPATIENT
Start: 2024-06-14 | End: 2024-06-14

## 2024-06-14 RX ORDER — FENTANYL CITRATE 50 UG/ML
INJECTION, SOLUTION INTRAMUSCULAR; INTRAVENOUS AS NEEDED
Status: COMPLETED | OUTPATIENT
Start: 2024-06-14 | End: 2024-06-14

## 2024-06-14 RX ORDER — METHYLPREDNISOLONE ACETATE 40 MG/ML
INJECTION, SUSPENSION INTRA-ARTICULAR; INTRALESIONAL; INTRAMUSCULAR; SOFT TISSUE AS NEEDED
Status: COMPLETED | OUTPATIENT
Start: 2024-06-14 | End: 2024-06-14

## 2024-06-14 ASSESSMENT — COLUMBIA-SUICIDE SEVERITY RATING SCALE - C-SSRS
2. HAVE YOU ACTUALLY HAD ANY THOUGHTS OF KILLING YOURSELF?: NO
1. IN THE PAST MONTH, HAVE YOU WISHED YOU WERE DEAD OR WISHED YOU COULD GO TO SLEEP AND NOT WAKE UP?: NO
6. HAVE YOU EVER DONE ANYTHING, STARTED TO DO ANYTHING, OR PREPARED TO DO ANYTHING TO END YOUR LIFE?: NO

## 2024-06-14 ASSESSMENT — ENCOUNTER SYMPTOMS
LOSS OF SENSATION IN FEET: 0
DEPRESSION: 0
OCCASIONAL FEELINGS OF UNSTEADINESS: 0

## 2024-06-14 ASSESSMENT — PAIN SCALES - GENERAL
PAINLEVEL_OUTOF10: 0 - NO PAIN
PAINLEVEL_OUTOF10: 5 - MODERATE PAIN
PAINLEVEL_OUTOF10: 0 - NO PAIN

## 2024-06-14 ASSESSMENT — PATIENT HEALTH QUESTIONNAIRE - PHQ9
SUM OF ALL RESPONSES TO PHQ9 QUESTIONS 1 AND 2: 0
1. LITTLE INTEREST OR PLEASURE IN DOING THINGS: NOT AT ALL
1. LITTLE INTEREST OR PLEASURE IN DOING THINGS: NOT AT ALL
2. FEELING DOWN, DEPRESSED OR HOPELESS: NOT AT ALL
SUM OF ALL RESPONSES TO PHQ9 QUESTIONS 1 AND 2: 0
2. FEELING DOWN, DEPRESSED OR HOPELESS: NOT AT ALL

## 2024-06-14 ASSESSMENT — PAIN - FUNCTIONAL ASSESSMENT
PAIN_FUNCTIONAL_ASSESSMENT: 0-10

## 2024-06-14 NOTE — DISCHARGE INSTRUCTIONS
Discharge Instructions:   ° Keep Band-Aid on for the next 24 hours.    ° No showering/bathing for the next 24 hours.    ° You may notice soreness or increased pain in the area of your injection, which may continue for the first 48 hours.    ° Avoid driving or operating any heavy machinery until the next day after the procedure.  ° You should resume any medications and your regular diet after the procedure.  ° You may resume regular daily activity but should avoid strenuous activity the day of the procedure.  Some of the side affects you may experience from the steroids are:  ° Insomnia (inability to sleep)  ° Increased sweating  ° Headaches  ° Increased fluid retention (swelling of your extremities)  ° Increase appetite  ° Face flushing  ° If you are a diabetic, your blood sugars may go up.  Closely monitor your diet.  Your blood sugar should return to normal in a few days.  Complications:   ° Complications are rare with the most common being temporary increase pain near the injection site. You can apply ice to affected area on the day of the procedure.   ° If the discomfort persists, apply moist heat to the area. Serious complications are very uncommon but may include bleeding, infection or nerve damage.   ° If severe pain, fever, redness or swelling near the injection site, have someone take you to the nearest emergency room to be evaluated for procedure complications or infection.  Expectations:   ° Local anesthetics wear off in several hours but duration of relief varies from individual to individual.     If you have any questions, please call the office at [phone].    If this is an emergency, call [phone] or go to your nearest hospital.

## 2024-07-03 ENCOUNTER — APPOINTMENT (OUTPATIENT)
Dept: PRIMARY CARE | Facility: CLINIC | Age: 64
End: 2024-07-03
Payer: COMMERCIAL

## 2024-07-03 ENCOUNTER — LAB (OUTPATIENT)
Dept: LAB | Facility: LAB | Age: 64
End: 2024-07-03
Payer: COMMERCIAL

## 2024-07-03 VITALS
SYSTOLIC BLOOD PRESSURE: 128 MMHG | DIASTOLIC BLOOD PRESSURE: 74 MMHG | BODY MASS INDEX: 30.58 KG/M2 | HEIGHT: 70 IN | TEMPERATURE: 97.6 F | OXYGEN SATURATION: 97 % | WEIGHT: 213.6 LBS | HEART RATE: 74 BPM

## 2024-07-03 DIAGNOSIS — R11.0 NAUSEA: ICD-10-CM

## 2024-07-03 DIAGNOSIS — Z01.818 PREOPERATIVE EXAMINATION: Primary | ICD-10-CM

## 2024-07-03 DIAGNOSIS — R42 VERTIGO: ICD-10-CM

## 2024-07-03 DIAGNOSIS — M50.10 CERVICAL DISC DISORDER WITH RADICULOPATHY: ICD-10-CM

## 2024-07-03 DIAGNOSIS — Z01.818 PREOPERATIVE EXAMINATION: ICD-10-CM

## 2024-07-03 DIAGNOSIS — M54.81 BILATERAL OCCIPITAL NEURALGIA: ICD-10-CM

## 2024-07-03 DIAGNOSIS — G44.86 HEADACHE, CERVICOGENIC: ICD-10-CM

## 2024-07-03 PROBLEM — M25.561 KNEE PAIN, RIGHT: Status: RESOLVED | Noted: 2023-01-26 | Resolved: 2024-07-03

## 2024-07-03 PROBLEM — M25.373 INSTABILITY OF ANKLE: Status: RESOLVED | Noted: 2023-01-26 | Resolved: 2024-07-03

## 2024-07-03 PROBLEM — M96.1 CERVICAL POST-LAMINECTOMY SYNDROME: Status: RESOLVED | Noted: 2023-01-26 | Resolved: 2024-07-03

## 2024-07-03 PROBLEM — N89.8 VAGINAL ITCHING: Status: RESOLVED | Noted: 2023-01-26 | Resolved: 2024-07-03

## 2024-07-03 PROBLEM — R10.9 ABDOMINAL PAIN: Status: RESOLVED | Noted: 2023-01-26 | Resolved: 2024-07-03

## 2024-07-03 PROBLEM — E66.3 OVERWEIGHT WITH BODY MASS INDEX (BMI) OF 29 TO 29.9 IN ADULT: Status: RESOLVED | Noted: 2023-01-26 | Resolved: 2024-07-03

## 2024-07-03 PROBLEM — M54.2 CERVICAL PAIN: Status: RESOLVED | Noted: 2023-01-26 | Resolved: 2024-07-03

## 2024-07-03 PROBLEM — R94.31 ABNORMAL EKG: Status: RESOLVED | Noted: 2023-01-26 | Resolved: 2024-07-03

## 2024-07-03 PROBLEM — N28.1 SIMPLE CYST OF KIDNEY: Status: RESOLVED | Noted: 2023-01-26 | Resolved: 2024-07-03

## 2024-07-03 LAB
ANION GAP SERPL CALC-SCNC: 11 MMOL/L (ref 10–20)
BUN SERPL-MCNC: 12 MG/DL (ref 6–23)
CALCIUM SERPL-MCNC: 9.3 MG/DL (ref 8.6–10.3)
CHLORIDE SERPL-SCNC: 107 MMOL/L (ref 98–107)
CO2 SERPL-SCNC: 23 MMOL/L (ref 21–32)
CREAT SERPL-MCNC: 0.72 MG/DL (ref 0.5–1.05)
EGFRCR SERPLBLD CKD-EPI 2021: >90 ML/MIN/1.73M*2
ERYTHROCYTE [DISTWIDTH] IN BLOOD BY AUTOMATED COUNT: 13.1 % (ref 11.5–14.5)
GLUCOSE SERPL-MCNC: 90 MG/DL (ref 74–99)
HCT VFR BLD AUTO: 39.1 % (ref 36–46)
HGB BLD-MCNC: 12.6 G/DL (ref 12–16)
MCH RBC QN AUTO: 30.2 PG (ref 26–34)
MCHC RBC AUTO-ENTMCNC: 32.2 G/DL (ref 32–36)
MCV RBC AUTO: 94 FL (ref 80–100)
NRBC BLD-RTO: 0 /100 WBCS (ref 0–0)
PLATELET # BLD AUTO: 256 X10*3/UL (ref 150–450)
POTASSIUM SERPL-SCNC: 4 MMOL/L (ref 3.5–5.3)
RBC # BLD AUTO: 4.17 X10*6/UL (ref 4–5.2)
SODIUM SERPL-SCNC: 137 MMOL/L (ref 136–145)
WBC # BLD AUTO: 5.8 X10*3/UL (ref 4.4–11.3)

## 2024-07-03 PROCEDURE — 99212 OFFICE O/P EST SF 10 MIN: CPT | Performed by: PHYSICIAN ASSISTANT

## 2024-07-03 PROCEDURE — 1036F TOBACCO NON-USER: CPT | Performed by: PHYSICIAN ASSISTANT

## 2024-07-03 PROCEDURE — 36415 COLL VENOUS BLD VENIPUNCTURE: CPT

## 2024-07-03 RX ORDER — MECLIZINE HCL 12.5 MG 12.5 MG/1
12.5 TABLET ORAL EVERY 8 HOURS PRN
Qty: 180 TABLET | Refills: 3 | Status: SHIPPED | OUTPATIENT
Start: 2024-07-03 | End: 2025-07-03

## 2024-07-03 RX ORDER — CLONAZEPAM 0.5 MG/1
0.5 TABLET ORAL NIGHTLY
Qty: 30 TABLET | Refills: 2 | Status: SHIPPED | OUTPATIENT
Start: 2024-07-03 | End: 2024-10-01

## 2024-07-03 RX ORDER — PREGABALIN 75 MG/1
CAPSULE ORAL
Qty: 112 CAPSULE | Refills: 2 | Status: SHIPPED | OUTPATIENT
Start: 2024-07-03 | End: 2024-10-01

## 2024-07-03 RX ORDER — PROMETHAZINE HYDROCHLORIDE 12.5 MG/1
12.5 TABLET ORAL EVERY 8 HOURS PRN
Qty: 90 TABLET | Refills: 3 | Status: SHIPPED | OUTPATIENT
Start: 2024-07-03 | End: 2025-07-03

## 2024-07-03 RX ORDER — IBUPROFEN 800 MG/1
800 TABLET ORAL EVERY 8 HOURS PRN
Qty: 90 TABLET | Refills: 3 | Status: SHIPPED | OUTPATIENT
Start: 2024-07-03

## 2024-07-03 NOTE — ASSESSMENT & PLAN NOTE
Saw cardiology 2022 Dr Trujillo's group prior to surgery for foot   EKG shows a sinus rhythm. She is able to walk without dyspnea. She is low cardiac risk for surgery and may proceed to the OR without further cardiac testing. Heart rate and blood pressure is well controlled today.   Sinus rhythm with 1st degree A-V block  Otherwise normal ECG  When compared with ECG of 10-APR-2018 15:18,  No significant change was found  Confirmed by Shan Trujillo ([id number]) on 12/10/2022 10:11:31 AM

## 2024-07-03 NOTE — PROGRESS NOTES
Subjective    Expected procedure:  L knee arthroscopic arthroscopy    Surgeon: Dr Liang [facility]  Date of Procedure: 7/17/24    Chronic left knee pain limiting activities of daily living despite more conservative measures.     Specialists  Dr Eason Neuro- migraines  Micki Sosa GI- IBS  Dr Sotomayor Spinal Surgeon- occipital neuralgia injections  Tsering Horton- ENT- esophageal dysphagia    Saw cardiology 2022 Dr Trujillo's group prior to surgery for foot   EKG shows a sinus rhythm. She is able to walk without dyspnea. She is low cardiac risk for surgery and may proceed to the OR without further cardiac testing. Heart rate and blood pressure is well controlled today.   Sinus rhythm with 1st degree A-V block  Otherwise normal ECG  When compared with ECG of 10-APR-2018 15:18,  No significant change was found  Confirmed by Shan Trujillo ([id number]) on 12/10/2022 10:11:31 AM  I am not concerned for cardiovascular status at this point. Previous EKGs also reviewed. Asymptomatic.       OARRS:  Natasha Dunbar PA-C on 7/2/2024  8:45 PM  I have personally reviewed the OARRS report for Lorraine Trivedi. I have considered the risks of abuse, dependence, addiction and diversion    Is the patient prescribed a combination of a benzodiazepine and opioid?  No    Last Urine Drug Screen / ordered today: No  Recent Results (from the past 8760 hour(s))   Confirmation Opiate/Opioid/Benzo Prescription Compliance    Collection Time: 05/24/24 10:18 AM   Result Value Ref Range    Clonazepam <25 <25 ng/mL    7-Aminoclonazepam 70 (H) <25 ng/mL    Alprazolam <25 <25 ng/mL    Alpha-Hydroxyalprazolam <25 <25 ng/mL    Midazolam <25 <25 ng/mL    Alpha-Hydroxymidazolam <25 <25 ng/mL    Chlordiazepoxide <25 <25 ng/mL    Diazepam <25 <25 ng/mL    Nordiazepam <25 <25 ng/mL    Temazepam <25 <25 ng/mL    Oxazepam <25 <25 ng/mL    Lorazepam <25 <25 ng/mL    Methadone <25 <25 ng/mL    EDDP <25 <25 ng/mL    6-Acetylmorphine <25 <25 ng/mL     Codeine <50 <50 ng/mL    Hydrocodone <25 <25 ng/mL    Hydromorphone <25 <25 ng/mL    Morphine  <50 <50 ng/mL    Norhydrocodone <25 <25 ng/mL    Noroxycodone <25 <25 ng/mL    Oxycodone <25 <25 ng/mL    Oxymorphone <25 <25 ng/mL    Fentanyl <2.5 <2.5 ng/mL    Norfentanyl <2.5 <2.5 ng/mL    Tramadol <50 <50 ng/mL    O-Desmethyltramadol <50 <50 ng/mL    Zolpidem <25 <25 ng/mL    Zolpidem Metabolite (ZCA) <25 <25 ng/mL   Screen Opiate/Opioid/Benzo Prescription Compliance    Collection Time: 05/24/24 10:18 AM   Result Value Ref Range    Creatinine, Urine Random 16.3 (L) 20.0 - 320.0 mg/dL    Amphetamine Screen, Urine Presumptive Negative Presumptive Negative    Barbiturate Screen, Urine Presumptive Negative Presumptive Negative    Cannabinoid Screen, Urine Presumptive Negative Presumptive Negative    Cocaine Metabolite Screen, Urine Presumptive Negative Presumptive Negative    PCP Screen, Urine Presumptive Negative Presumptive Negative     Results are as expected.         Controlled Substance Agreement:  Date of the Last Agreement: 4/16/24  Reviewed Controlled Substance Agreement including but not limited to the benefits, risks, and alternatives to treatment with a Controlled Substance medication(s).    Lyrica:  What is the patient's goal of therapy? pain relief, improved mobility and quality of life and improvement of debilitating effects of anxiety  Is this being achieved with current treatment? yes    Pain Assessment:   Flow sheet -> PADT    Activities of Daily Living:  Is your overall impression that this patient is benefiting (symptom reduction outweighs side effects) from Lyrica therapy? Yes     1. Physical Functioning: Better  2. Family Relationship: Better  3. Social Relationship: Better  4. Mood: Better  5. Sleep Patterns: Better  6. Overall Function: Better      Benzodiazepines:  What is the patient's goal of therapy?  improvement of debilitating effects of anxiety  Is this being achieved with current treatment?  yes    RIO-7:  0    Activities of Daily Living:   Is your overall impression that this patient is benefiting (symptom reduction outweighs side effects) from benzodiazepine therapy? Yes     1. Physical Functioning: Better  2. Family Relationship: Better  3. Social Relationship: Better  4. Mood: Better  5. Sleep Patterns: Same  6. Overall Function: Better    Allergies   Allergen Reactions    Adhesive Tape-Silicones Rash and Hives      History    Patient Active Problem List   Diagnosis    Occipital neuralgia    Cervical myofascial pain syndrome    Chronic lumbar radiculopathy    Dyslipidemia    GERD (gastroesophageal reflux disease)    History of cold sores    Irritable bowel syndrome with diarrhea    Peroneal tendon tear    Pes cavus, congenital    Simple cyst of kidney    Vertigo    Nausea in adult    Otalgia of both ears    Migraine without aura and without status migrainosus, not intractable    Lumbar stenosis with neurogenic claudication    Facet arthropathy, cervical    Cervical radiculopathy    Postlaminectomy syndrome, cervical region       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    Irritable bowel syndrome     Occipital neuralgia     Radiculopathy, lumbar region 07/22/2015    Chronic radicular lumbar pain    Seasickness 01/26/2023    Thoracic back pain 01/26/2023    Thrush, oral 01/26/2023      Past Surgical History:   Procedure Laterality Date    EYE SURGERY  04/20/2015    Eye Surgery Lasik    OTHER SURGICAL HISTORY  12/14/2020    Neck surgery - neck fusion    OTHER SURGICAL HISTORY Left 12/14/2020    Foot surgery x3    OTHER SURGICAL HISTORY  12/18/2018    Ankle surgery    TUBAL LIGATION  04/20/2015    Tubal Ligation       Family History   Problem Relation Name Age of Onset    Diabetes Mother      Breast cancer Mother          mastectomy    Stroke Mother          x2    Cancer Father Pete     Alcohol abuse Father Pete     Stomach cancer Father Pete     Heart attack Brother  50    Heart attack Mother's Brother      Heart attack Maternal Grandfather      Other (pacemaker) Maternal Grandfather      Heart failure Other Grandmother     Heart failure Other grandparent       Social History     Tobacco Use    Smoking status: Former     Current packs/day: 0.00     Average packs/day: 1 pack/day for 35.0 years (35.0 ttl pk-yrs)     Types: Cigarettes     Start date: 7/15/1980     Quit date: 7/15/2015     Years since quittin.9    Smokeless tobacco: Former   Substance Use Topics    Alcohol use: Never          Current Outpatient Medications:     b complex 0.4 mg tablet, Take 1 tablet by mouth once daily., Disp: , Rfl:     bismuth subsalicylate (Pepto Bismol) 262 mg chewable tablet, Chew 2 tablets (524 mg) if needed for indigestion., Disp: , Rfl:     esomeprazole (NexIUM) 40 mg DR capsule, Take 1 capsule (40 mg) by mouth 2 times a day before meals. Do not open capsule., Disp: 180 capsule, Rfl: 1    fluticasone (Flonase) 50 mcg/actuation nasal spray, Administer 1 spray into each nostril once daily. Shake gently. Before first use, prime pump. After use, clean tip and replace cap., Disp: 16 g, Rfl: 5    hyoscyamine (Anaspaz, Levsin) 0.125 mg tablet, Take 1 tablet (0.125 mg) by mouth 4 times a day., Disp: 360 tablet, Rfl: 3    melatonin 1 mg tablet, Take 1 tablet (1 mg) by mouth once daily at bedtime., Disp: , Rfl:     methocarbamol (Robaxin) 750 mg tablet, Take 1 tablet (750 mg) by mouth 3 times a day as needed for muscle spasms. PRN, Disp: 270 tablet, Rfl: 3    ondansetron ODT (Zofran-ODT) 4 mg disintegrating tablet, Take 1 tablet (4 mg) by mouth every 12 hours if needed for nausea or vomiting., Disp: 20 tablet, Rfl: 11    pravastatin  (Pravachol) 10 mg tablet, Take 1 tablet (10 mg) by mouth once daily at bedtime., Disp: 90 tablet, Rfl: 3    rifAXIMin (Xifaxan) 550 mg tablet, Take 1 tablet (550 mg) by mouth 3 times a day., Disp: , Rfl:     rizatriptan MLT (Maxalt-MLT) 10 mg disintegrating tablet, Take 1 tablet (10 mg) by mouth 1 time if needed for migraine. May repeat in 2 hours if unresolved. Do not exceed 30 mg in 24 hours., Disp: 30 tablet, Rfl: 11    topiramate (Topamax) 100 mg tablet, Take 1 tablet (100 mg) by mouth once daily., Disp: 90 tablet, Rfl: 3    clonazePAM (KlonoPIN) 0.5 mg tablet, Take 1 tablet (0.5 mg) by mouth once daily at bedtime. To help sleep with pain and anxiety, Disp: 30 tablet, Rfl: 2    ibuprofen (IBU) 800 mg tablet, Take 1 tablet (800 mg) by mouth every 8 hours if needed for moderate pain (4 - 6)., Disp: 90 tablet, Rfl: 3    meclizine (Antivert) 12.5 mg tablet, Take 1 tablet (12.5 mg) by mouth every 8 hours if needed (For Vertigo)., Disp: 180 tablet, Rfl: 3    pregabalin (Lyrica) 75 mg capsule, 1 capsule PO in the morning, 1 capsule in the afternoon, 2 capsules a bedtime, Disp: 112 capsule, Rfl: 2    promethazine (Phenergan) 12.5 mg tablet, Take 1 tablet (12.5 mg) by mouth every 8 hours if needed for nausea or vomiting., Disp: 90 tablet, Rfl: 3     Review of Systems    Constitutional: Denies fever  HEENT: Denies ST, earache  CVS: Denies Chest pain  Pulmonary: Denies wheezing, SOB  GI: Denies N/V  : Denies dysuria  Musculoskeletal:  Denies myalgia  Neuro: Denies focal weakness or numbness.  Skin: Denies Rashes.  *Review of Systems is negative unless otherwise mentioned in HPI or ROS above.    Objective    Vitals:    07/03/24 0951   BP: 128/74   Pulse: 74   Temp: 36.4 °C (97.6 °F)   SpO2: 97%     Body mass index is 30.65 kg/m².     Exam    Constitutional: NAD.  Resting comfortably.  Head: Atraumatic, normocephalic.  ENT: moist oral mucosa. Nasal mucosa mildly edematous and nonerythematous. Posterior oropharynx  nonerythematous with mild clear posterior pharyngeal streaking.  TM: Bilat TM nonerythematous, pearly grey, landmarks intact. EAC wnl bilat.  Eyes: PERRLA. EOM intact.   Lymph: No anterior cervical chain lymphadenopathy or submandibular lymphadenopathy.   Cardiac: Regular rate & rhythm.   Pulmonary: Lungs clear bilat  GI: Soft, Nontender, nondistended.   Musculoskeletal: No peripheral edema.   Skin: No evidence of trauma. No rashes  Psych: Intact judgement and insight.      Results  CBC and BMP reviewed      Plan    MDM    Any Advanced Directives and Power of  paperwork will be available under EPIC EMR under advanced directives.    A presurgical medical evaluation was completed on this patient and baseline health is in stable condition.     Assessment    Problem List Items Addressed This Visit       Occipital neuralgia    Relevant Medications    clonazePAM (KlonoPIN) 0.5 mg tablet    pregabalin (Lyrica) 75 mg capsule    Vertigo    Relevant Medications    meclizine (Antivert) 12.5 mg tablet     Other Visit Diagnoses       Preoperative examination    -  Primary    Relevant Orders    CBC    Basic metabolic panel    Cervical disc disorder with radiculopathy        Relevant Medications    pregabalin (Lyrica) 75 mg capsule    Nausea        Relevant Medications    promethazine (Phenergan) 12.5 mg tablet    Headache, cervicogenic        Relevant Medications    ibuprofen (IBU) 800 mg tablet

## 2024-07-17 ENCOUNTER — APPOINTMENT (OUTPATIENT)
Dept: PRIMARY CARE | Facility: CLINIC | Age: 64
End: 2024-07-17
Payer: COMMERCIAL

## 2024-07-23 ENCOUNTER — APPOINTMENT (OUTPATIENT)
Dept: PAIN MEDICINE | Facility: HOSPITAL | Age: 64
End: 2024-07-23
Payer: COMMERCIAL

## 2024-07-24 ENCOUNTER — TELEPHONE (OUTPATIENT)
Dept: PRIMARY CARE | Facility: CLINIC | Age: 64
End: 2024-07-24
Payer: COMMERCIAL

## 2024-07-24 DIAGNOSIS — S86.319S: ICD-10-CM

## 2024-07-24 DIAGNOSIS — M72.2 PLANTAR FASCIITIS, BILATERAL: Primary | ICD-10-CM

## 2024-07-24 DIAGNOSIS — Q66.70 PES CAVUS, CONGENITAL: ICD-10-CM

## 2024-07-24 NOTE — TELEPHONE ENCOUNTER
Will you put in a referral for PT for her feet?  She had knee sx and it is affecting the way she walks and her plantar fascitis is bothering her.  She would like referral for both feet.

## 2024-09-28 DIAGNOSIS — K21.9 GASTROESOPHAGEAL REFLUX DISEASE, UNSPECIFIED WHETHER ESOPHAGITIS PRESENT: ICD-10-CM

## 2024-09-30 RX ORDER — ESOMEPRAZOLE MAGNESIUM 40 MG/1
CAPSULE, DELAYED RELEASE ORAL
Qty: 180 CAPSULE | Refills: 1 | Status: SHIPPED | OUTPATIENT
Start: 2024-09-30

## 2024-10-08 ENCOUNTER — APPOINTMENT (OUTPATIENT)
Dept: PRIMARY CARE | Facility: CLINIC | Age: 64
End: 2024-10-08
Payer: COMMERCIAL

## 2024-10-08 VITALS
HEART RATE: 67 BPM | SYSTOLIC BLOOD PRESSURE: 126 MMHG | WEIGHT: 211.8 LBS | BODY MASS INDEX: 30.39 KG/M2 | TEMPERATURE: 98.2 F | DIASTOLIC BLOOD PRESSURE: 78 MMHG | OXYGEN SATURATION: 98 %

## 2024-10-08 DIAGNOSIS — G44.86 HEADACHE, CERVICOGENIC: ICD-10-CM

## 2024-10-08 DIAGNOSIS — J06.9 UPPER RESPIRATORY TRACT INFECTION, UNSPECIFIED TYPE: ICD-10-CM

## 2024-10-08 DIAGNOSIS — B37.9 ANTIBIOTIC-INDUCED YEAST INFECTION: ICD-10-CM

## 2024-10-08 DIAGNOSIS — T36.95XA ANTIBIOTIC-INDUCED YEAST INFECTION: ICD-10-CM

## 2024-10-08 DIAGNOSIS — H65.03 NON-RECURRENT ACUTE SEROUS OTITIS MEDIA OF BOTH EARS: ICD-10-CM

## 2024-10-08 DIAGNOSIS — J40 BRONCHITIS: Primary | ICD-10-CM

## 2024-10-08 DIAGNOSIS — M50.10 CERVICAL DISC DISORDER WITH RADICULOPATHY: ICD-10-CM

## 2024-10-08 DIAGNOSIS — M54.81 BILATERAL OCCIPITAL NEURALGIA: ICD-10-CM

## 2024-10-08 DIAGNOSIS — R42 VERTIGO: ICD-10-CM

## 2024-10-08 PROCEDURE — 99214 OFFICE O/P EST MOD 30 MIN: CPT | Performed by: PHYSICIAN ASSISTANT

## 2024-10-08 RX ORDER — MECLIZINE HCL 12.5 MG 12.5 MG/1
12.5 TABLET ORAL EVERY 8 HOURS PRN
Qty: 180 TABLET | Refills: 3 | Status: SHIPPED | OUTPATIENT
Start: 2024-10-08 | End: 2025-10-08

## 2024-10-08 RX ORDER — FLUTICASONE PROPIONATE 50 MCG
1 SPRAY, SUSPENSION (ML) NASAL DAILY
Qty: 16 G | Refills: 11 | Status: SHIPPED | OUTPATIENT
Start: 2024-10-08 | End: 2025-10-08

## 2024-10-08 RX ORDER — ALBUTEROL SULFATE 90 UG/1
2 INHALANT RESPIRATORY (INHALATION) EVERY 6 HOURS PRN
Qty: 8 G | Refills: 0 | Status: SHIPPED | OUTPATIENT
Start: 2024-10-08 | End: 2024-10-16

## 2024-10-08 RX ORDER — PREGABALIN 75 MG/1
CAPSULE ORAL
Qty: 112 CAPSULE | Refills: 2 | Status: SHIPPED | OUTPATIENT
Start: 2024-10-08 | End: 2025-01-06

## 2024-10-08 RX ORDER — IBUPROFEN 800 MG/1
800 TABLET ORAL EVERY 8 HOURS PRN
Qty: 90 TABLET | Refills: 3 | Status: SHIPPED | OUTPATIENT
Start: 2024-10-08

## 2024-10-08 RX ORDER — AZITHROMYCIN 250 MG/1
TABLET, FILM COATED ORAL
Qty: 6 TABLET | Refills: 0 | Status: SHIPPED | OUTPATIENT
Start: 2024-10-08 | End: 2024-10-13

## 2024-10-08 RX ORDER — FLUCONAZOLE 150 MG/1
150 TABLET ORAL ONCE
Qty: 1 TABLET | Refills: 0 | Status: SHIPPED | OUTPATIENT
Start: 2024-10-08 | End: 2024-10-08

## 2024-10-08 RX ORDER — CLONAZEPAM 0.5 MG/1
0.5 TABLET ORAL NIGHTLY
Qty: 30 TABLET | Refills: 2 | Status: SHIPPED | OUTPATIENT
Start: 2024-10-08 | End: 2025-01-06

## 2024-10-08 ASSESSMENT — ANXIETY QUESTIONNAIRES
2. NOT BEING ABLE TO STOP OR CONTROL WORRYING: NOT AT ALL
7. FEELING AFRAID AS IF SOMETHING AWFUL MIGHT HAPPEN: NOT AT ALL
6. BECOMING EASILY ANNOYED OR IRRITABLE: NOT AT ALL
IF YOU CHECKED OFF ANY PROBLEMS ON THIS QUESTIONNAIRE, HOW DIFFICULT HAVE THESE PROBLEMS MADE IT FOR YOU TO DO YOUR WORK, TAKE CARE OF THINGS AT HOME, OR GET ALONG WITH OTHER PEOPLE: NOT DIFFICULT AT ALL
5. BEING SO RESTLESS THAT IT IS HARD TO SIT STILL: NOT AT ALL
GAD7 TOTAL SCORE: 0
4. TROUBLE RELAXING: NOT AT ALL
3. WORRYING TOO MUCH ABOUT DIFFERENT THINGS: NOT AT ALL
1. FEELING NERVOUS, ANXIOUS, OR ON EDGE: NOT AT ALL

## 2024-10-08 NOTE — PROGRESS NOTES
Subjective     HPI   Lorraine Trivedi is a 64 y.o. year old female patient with presenting to clinic with concern for   Chief Complaint   Patient presents with    3 month medical management    chest congestion     Tightness, cough  X 4-5 days  Covid Neg  Some SOB    Cough     Yellow phlegm       Has been coughing     3 month follow up    Had left Knee arthroscopic debridement surgery for miniscus [facility] 3 mo ago. Just finished PT.     Still in PT for plantar fasciitis.     Specialists  Dr Eason Neuro- migraines- botox injections Trigeminal neuralgia  Micki Sosa GI- IBS (Anaspaz, not XR) and Xifaxan  Dr Sotomayor Spinal Surgeon- occipital neuralgia injections  Tsering Horton- ENT- esophageal dysphagia  Cardiology 2022 Dr Trujillo-Sinus rhythm with 1st degree A-V block  Dr Acosta Pain mgt injections    On Lyrica & Klonopin.  PCP    OARRS:  Natasha Dunbar PA-C on 10/8/2024  9:22 AM  I have personally reviewed the OARRS report for Lorraine Trivedi. I have considered the risks of abuse, dependence, addiction and diversion    Is the patient prescribed a combination of a benzodiazepine and opioid?  No    Last Urine Drug Screen / ordered today: No  Recent Results (from the past 8760 hour(s))   Confirmation Opiate/Opioid/Benzo Prescription Compliance    Collection Time: 05/24/24 10:18 AM   Result Value Ref Range    Clonazepam <25 <25 ng/mL    7-Aminoclonazepam 70 (H) <25 ng/mL    Alprazolam <25 <25 ng/mL    Alpha-Hydroxyalprazolam <25 <25 ng/mL    Midazolam <25 <25 ng/mL    Alpha-Hydroxymidazolam <25 <25 ng/mL    Chlordiazepoxide <25 <25 ng/mL    Diazepam <25 <25 ng/mL    Nordiazepam <25 <25 ng/mL    Temazepam <25 <25 ng/mL    Oxazepam <25 <25 ng/mL    Lorazepam <25 <25 ng/mL    Methadone <25 <25 ng/mL    EDDP <25 <25 ng/mL    6-Acetylmorphine <25 <25 ng/mL    Codeine <50 <50 ng/mL    Hydrocodone <25 <25 ng/mL    Hydromorphone <25 <25 ng/mL    Morphine  <50 <50 ng/mL    Norhydrocodone <25 <25 ng/mL    Noroxycodone  <25 <25 ng/mL    Oxycodone <25 <25 ng/mL    Oxymorphone <25 <25 ng/mL    Fentanyl <2.5 <2.5 ng/mL    Norfentanyl <2.5 <2.5 ng/mL    Tramadol <50 <50 ng/mL    O-Desmethyltramadol <50 <50 ng/mL    Zolpidem <25 <25 ng/mL    Zolpidem Metabolite (ZCA) <25 <25 ng/mL   Screen Opiate/Opioid/Benzo Prescription Compliance    Collection Time: 24 10:18 AM   Result Value Ref Range    Creatinine, Urine Random 16.3 (L) 20.0 - 320.0 mg/dL    Amphetamine Screen, Urine Presumptive Negative Presumptive Negative    Barbiturate Screen, Urine Presumptive Negative Presumptive Negative    Cannabinoid Screen, Urine Presumptive Negative Presumptive Negative    Cocaine Metabolite Screen, Urine Presumptive Negative Presumptive Negative    PCP Screen, Urine Presumptive Negative Presumptive Negative     Results are as expected.         Controlled Substance Agreement:  Date of the Last Agreement: Lyrica & clonazepam CSA 24  Reviewed Controlled Substance Agreement including but not limited to the benefits, risks, and alternatives to treatment with a Controlled Substance medication(s).    Benzodiazepines:  What is the patient's goal of therapy? improvement of debilitating effects of anxiety  Is this being achieved with current treatment? yes    RIO-7:  Over the last 2 weeks, how often have you been bothered by any of the following problems?  Feeling nervous, anxious, or on edge: 0  Not being able to stop or control worryin  Worrying too much about different things: 0  Trouble relaxin  Being so restless that it is hard to sit still: 0  Becoming easily annoyed or irritable: 0  Feeling afraid as if something awful might happen: 0  RIO-7 Total Score: 0        Activities of Daily Living:   Is your overall impression that this patient is benefiting (symptom reduction outweighs side effects) from benzodiazepine therapy? Yes     1. Physical Functioning: Better  2. Family Relationship: Better  3. Social Relationship: Same  4. Mood:  Same  5. Sleep Patterns: Better  6. Overall Function: Better     and Lyrica:  What is the patient's goal of therapy? pain relief, improved mobility and quality of life  Is this being achieved with current treatment? yes    Pain Assessment:  No data recorded    Activities of Daily Living:  Is your overall impression that this patient is benefiting (symptom reduction outweighs side effects) from Lyrica therapy? Yes     1. Physical Functioning: same  2. Family Relationship: Same  3. Social Relationship: Same  4. Mood: same  5. Sleep Patterns: same  6. Overall Function: better        Patient Active Problem List   Diagnosis    Occipital neuralgia    Cervical myofascial pain syndrome    Chronic lumbar radiculopathy    Dyslipidemia    GERD (gastroesophageal reflux disease)    History of cold sores    Irritable bowel syndrome with diarrhea    Peroneal tendon tear    Pes cavus, congenital    Vertigo    Nausea in adult    Otalgia of both ears    Migraine without aura and without status migrainosus, not intractable    Lumbar stenosis with neurogenic claudication    Facet arthropathy, cervical    Cervical radiculopathy    Postlaminectomy syndrome, cervical region       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    Irritable bowel syndrome     Occipital neuralgia     Radiculopathy, lumbar region 07/22/2015    Chronic radicular lumbar pain    Seasickness 01/26/2023    Simple cyst of kidney 01/26/2023    Thoracic back pain 01/26/2023    Thrush, oral 01/26/2023      Past Surgical History:   Procedure Laterality Date    EYE SURGERY  04/20/2015    Eye Surgery Lasik    OTHER SURGICAL HISTORY  12/14/2020    Neck surgery - neck fusion    OTHER  SURGICAL HISTORY Left 2020    Foot surgery x3    OTHER SURGICAL HISTORY  2018    Ankle surgery    TUBAL LIGATION  2015    Tubal Ligation      Family History   Problem Relation Name Age of Onset    Diabetes Mother      Breast cancer Mother          mastectomy    Stroke Mother          x2    Cancer Father Pete     Alcohol abuse Father Pete     Stomach cancer Father Pete     Heart attack Brother  50    Heart attack Mother's Brother      Heart attack Maternal Grandfather      Other (pacemaker) Maternal Grandfather      Heart failure Other Grandmother     Heart failure Other grandparent       Social History     Tobacco Use    Smoking status: Former     Current packs/day: 0.00     Average packs/day: 1 pack/day for 35.0 years (35.0 ttl pk-yrs)     Types: Cigarettes     Start date: 7/15/1980     Quit date: 7/15/2015     Years since quittin.2    Smokeless tobacco: Former   Substance Use Topics    Alcohol use: Never        Current Outpatient Medications:     b complex 0.4 mg tablet, Take 1 tablet by mouth once daily., Disp: , Rfl:     bismuth subsalicylate (Pepto Bismol) 262 mg chewable tablet, Chew 2 tablets (524 mg) if needed for indigestion., Disp: , Rfl:     esomeprazole (NexIUM) 40 mg DR capsule, TAKE 1 CAPSULE BY MOUTH TWO TIMES A DAY BEFORE MEALS. DO NOT OPEN CAPSULE., Disp: 180 capsule, Rfl: 1    hyoscyamine (Anaspaz, Levsin) 0.125 mg tablet, Take 1 tablet (0.125 mg) by mouth 4 times a day., Disp: 360 tablet, Rfl: 3    melatonin 1 mg tablet, Take 1 tablet (1 mg) by mouth once daily at bedtime., Disp: , Rfl:     methocarbamol (Robaxin) 750 mg tablet, Take 1 tablet (750 mg) by mouth 3 times a day as needed for muscle spasms. PRN, Disp: 270 tablet, Rfl: 3    pravastatin (Pravachol) 10 mg tablet, Take 1 tablet (10 mg) by mouth once daily at bedtime., Disp: 90 tablet, Rfl: 3    promethazine (Phenergan) 12.5 mg tablet, Take 1 tablet (12.5 mg) by mouth every 8 hours if needed for nausea or vomiting.,  Disp: 90 tablet, Rfl: 3    rifAXIMin (Xifaxan) 550 mg tablet, Take 1 tablet (550 mg) by mouth 3 times a day., Disp: , Rfl:     rizatriptan MLT (Maxalt-MLT) 10 mg disintegrating tablet, Take 1 tablet (10 mg) by mouth 1 time if needed for migraine. May repeat in 2 hours if unresolved. Do not exceed 30 mg in 24 hours., Disp: 30 tablet, Rfl: 11    topiramate (Topamax) 100 mg tablet, Take 1 tablet (100 mg) by mouth once daily., Disp: 90 tablet, Rfl: 3    albuterol (Ventolin HFA) 90 mcg/actuation inhaler, Inhale 2 puffs every 6 hours if needed for wheezing or shortness of breath for up to 8 days., Disp: 8 g, Rfl: 0    azithromycin (Zithromax) 250 mg tablet, Take 2 tablets (500 mg) by mouth once daily for 1 day, THEN 1 tablet (250 mg) once daily for 4 days. Take 2 tabs (500 mg) by mouth today, than 1 daily for 4 days.., Disp: 6 tablet, Rfl: 0    clonazePAM (KlonoPIN) 0.5 mg tablet, Take 1 tablet (0.5 mg) by mouth once daily at bedtime. To help sleep with pain and anxiety, Disp: 30 tablet, Rfl: 2    fluconazole (Diflucan) 150 mg tablet, Take 1 tablet (150 mg) by mouth 1 time for 1 dose., Disp: 1 tablet, Rfl: 0    fluticasone (Flonase) 50 mcg/actuation nasal spray, Administer 1 spray into each nostril once daily. Shake gently. Before first use, prime pump. After use, clean tip and replace cap., Disp: 16 g, Rfl: 11    ibuprofen (IBU) 800 mg tablet, Take 1 tablet (800 mg) by mouth every 8 hours if needed for moderate pain (4 - 6)., Disp: 90 tablet, Rfl: 3    meclizine (Antivert) 12.5 mg tablet, Take 1 tablet (12.5 mg) by mouth every 8 hours if needed (For Vertigo)., Disp: 180 tablet, Rfl: 3    pregabalin (Lyrica) 75 mg capsule, 1 capsule PO in the morning, 1 capsule in the afternoon, 2 capsules a bedtime, Disp: 112 capsule, Rfl: 2     Review of Systems  Constitutional: Denies fever  HEENT: Denies ST, earache  CVS: Denies Chest pain  Pulmonary: Denies wheezing, SOB  GI: Denies N/V  : Denies dysuria  Musculoskeletal:  Denies  myalgia  Neuro: Denies focal weakness or numbness.  Skin: Denies Rashes.  *Review of Systems is negative unless otherwise mentioned in HPI or ROS above.    Objective   /78   Pulse 67   Temp 36.8 °C (98.2 °F)   Wt 96.1 kg (211 lb 12.8 oz)   SpO2 98%   BMI 30.39 kg/m²  reviewed Body mass index is 30.39 kg/m².     Physical Exam  Constitutional: NAD.  Resting comfortably.  Head: Atraumatic, normocephalic.  ENT: Moist oral mucosa. Nasal mucosa wnl.   Cardiac: Regular rate & rhythm.   Pulmonary: Lungs clear bilat  GI: Soft, Nontender, nondistended.   Musculoskeletal: No peripheral edema.   Skin: No evidence of trauma. No rashes  Psych: Intact judgement and insight.    .Assessment/Plan   Problem List Items Addressed This Visit             ICD-10-CM    Occipital neuralgia M54.81    Relevant Medications    clonazePAM (KlonoPIN) 0.5 mg tablet    pregabalin (Lyrica) 75 mg capsule    Vertigo R42    Relevant Medications    meclizine (Antivert) 12.5 mg tablet     Other Visit Diagnoses         Codes    Bronchitis    -  Primary J40    Relevant Medications    azithromycin (Zithromax) 250 mg tablet    albuterol (Ventolin HFA) 90 mcg/actuation inhaler    Cervical disc disorder with radiculopathy     M50.10    Relevant Medications    pregabalin (Lyrica) 75 mg capsule    Non-recurrent acute serous otitis media of both ears     H65.03    Relevant Medications    azithromycin (Zithromax) 250 mg tablet    fluticasone (Flonase) 50 mcg/actuation nasal spray    Antibiotic-induced yeast infection     B37.9, T36.95XA    Relevant Medications    fluconazole (Diflucan) 150 mg tablet    Headache, cervicogenic     G44.86    Relevant Medications    ibuprofen (IBU) 800 mg tablet    Upper respiratory tract infection, unspecified type     J06.9    Relevant Medications    fluticasone (Flonase) 50 mcg/actuation nasal spray

## 2024-11-07 ENCOUNTER — OFFICE VISIT (OUTPATIENT)
Dept: PRIMARY CARE | Facility: CLINIC | Age: 64
End: 2024-11-07
Payer: COMMERCIAL

## 2024-11-07 ENCOUNTER — HOSPITAL ENCOUNTER (OUTPATIENT)
Dept: RADIOLOGY | Facility: HOSPITAL | Age: 64
Discharge: HOME | End: 2024-11-07
Payer: COMMERCIAL

## 2024-11-07 VITALS
WEIGHT: 212 LBS | OXYGEN SATURATION: 98 % | SYSTOLIC BLOOD PRESSURE: 136 MMHG | DIASTOLIC BLOOD PRESSURE: 76 MMHG | HEART RATE: 87 BPM | TEMPERATURE: 97.4 F | BODY MASS INDEX: 30.42 KG/M2

## 2024-11-07 DIAGNOSIS — G89.29 CHRONIC RADICULAR LOW BACK PAIN: ICD-10-CM

## 2024-11-07 DIAGNOSIS — M48.062 LUMBAR STENOSIS WITH NEUROGENIC CLAUDICATION: ICD-10-CM

## 2024-11-07 DIAGNOSIS — J01.00 ACUTE MAXILLARY SINUSITIS, RECURRENCE NOT SPECIFIED: ICD-10-CM

## 2024-11-07 DIAGNOSIS — J40 BRONCHITIS: Primary | ICD-10-CM

## 2024-11-07 DIAGNOSIS — B00.1 RECURRENT COLD SORES: ICD-10-CM

## 2024-11-07 DIAGNOSIS — M54.16 CHRONIC RADICULAR LOW BACK PAIN: ICD-10-CM

## 2024-11-07 DIAGNOSIS — J40 BRONCHITIS: ICD-10-CM

## 2024-11-07 PROCEDURE — 1036F TOBACCO NON-USER: CPT | Performed by: PHYSICIAN ASSISTANT

## 2024-11-07 PROCEDURE — 99214 OFFICE O/P EST MOD 30 MIN: CPT | Performed by: PHYSICIAN ASSISTANT

## 2024-11-07 PROCEDURE — 71046 X-RAY EXAM CHEST 2 VIEWS: CPT

## 2024-11-07 RX ORDER — VALACYCLOVIR HYDROCHLORIDE 1 G/1
TABLET, FILM COATED ORAL
Qty: 2 TABLET | Refills: 5 | Status: SHIPPED | OUTPATIENT
Start: 2024-11-07

## 2024-11-07 RX ORDER — AZITHROMYCIN 250 MG/1
TABLET, FILM COATED ORAL
Qty: 6 TABLET | Refills: 0 | Status: SHIPPED | OUTPATIENT
Start: 2024-11-07 | End: 2024-11-12

## 2024-11-07 RX ORDER — PREDNISONE 20 MG/1
TABLET ORAL
Qty: 15 TABLET | Refills: 0 | Status: SHIPPED | OUTPATIENT
Start: 2024-11-07

## 2024-11-07 NOTE — PROGRESS NOTES
Subjective     HPI   Lorraine Trivedi is a 64 y.o. year old female patient with presenting to clinic with concern for   Chief Complaint   Patient presents with    Cough    Earache    Headache       Cough, earache, headaches.    Treated for bronchitis with azithro and inhaler 1 month ago. Ongoing symptoms of sinus pressure and wheezing. Sore throat worsening.    Occipital Neuralgia flared up. Causing HA and earaches.     Patient Active Problem List   Diagnosis    Occipital neuralgia    Cervical myofascial pain syndrome    Chronic lumbar radiculopathy    Dyslipidemia    GERD (gastroesophageal reflux disease)    History of cold sores    Irritable bowel syndrome with diarrhea    Peroneal tendon tear    Pes cavus, congenital    Vertigo    Nausea in adult    Otalgia of both ears    Migraine without aura and without status migrainosus, not intractable    Lumbar stenosis with neurogenic claudication    Facet arthropathy, cervical    Cervical radiculopathy    Postlaminectomy syndrome, cervical region       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    Irritable bowel syndrome     Occipital neuralgia     Radiculopathy, lumbar region 07/22/2015    Chronic radicular lumbar pain    Seasickness 01/26/2023    Simple cyst of kidney 01/26/2023    Thoracic back pain 01/26/2023    Thrush, oral 01/26/2023      Past Surgical History:   Procedure Laterality Date    EYE SURGERY  04/20/2015    Eye Surgery Lasik    OTHER SURGICAL HISTORY  12/14/2020    Neck surgery - neck fusion    OTHER SURGICAL HISTORY Left 12/14/2020    Foot surgery x3    OTHER SURGICAL HISTORY  12/18/2018    Ankle surgery    TUBAL LIGATION  04/20/2015    Tubal Ligation      Family  History   Problem Relation Name Age of Onset    Diabetes Mother      Breast cancer Mother          mastectomy    Stroke Mother          x2    Cancer Father Pete     Alcohol abuse Father Pete     Stomach cancer Father Pete     Heart attack Brother  50    Heart attack Mother's Brother      Heart attack Maternal Grandfather      Other (pacemaker) Maternal Grandfather      Heart failure Other Grandmother     Heart failure Other grandparent       Social History     Tobacco Use    Smoking status: Former     Current packs/day: 0.00     Average packs/day: 1 pack/day for 35.0 years (35.0 ttl pk-yrs)     Types: Cigarettes     Start date: 7/15/1980     Quit date: 7/15/2015     Years since quittin.3    Smokeless tobacco: Former   Substance Use Topics    Alcohol use: Never        Current Outpatient Medications:     b complex 0.4 mg tablet, Take 1 tablet by mouth once daily., Disp: , Rfl:     bismuth subsalicylate (Pepto Bismol) 262 mg chewable tablet, Chew 2 tablets (524 mg) if needed for indigestion., Disp: , Rfl:     clonazePAM (KlonoPIN) 0.5 mg tablet, Take 1 tablet (0.5 mg) by mouth once daily at bedtime. To help sleep with pain and anxiety, Disp: 30 tablet, Rfl: 2    esomeprazole (NexIUM) 40 mg DR capsule, TAKE 1 CAPSULE BY MOUTH TWO TIMES A DAY BEFORE MEALS. DO NOT OPEN CAPSULE., Disp: 180 capsule, Rfl: 1    fluticasone (Flonase) 50 mcg/actuation nasal spray, Administer 1 spray into each nostril once daily. Shake gently. Before first use, prime pump. After use, clean tip and replace cap., Disp: 16 g, Rfl: 11    hyoscyamine (Anaspaz, Levsin) 0.125 mg tablet, Take 1 tablet (0.125 mg) by mouth 4 times a day., Disp: 360 tablet, Rfl: 3    ibuprofen (IBU) 800 mg tablet, Take 1 tablet (800 mg) by mouth every 8 hours if needed for moderate pain (4 - 6)., Disp: 90 tablet, Rfl: 3    meclizine (Antivert) 12.5 mg tablet, Take 1 tablet (12.5 mg) by mouth every 8 hours if needed (For Vertigo)., Disp: 180 tablet, Rfl: 3    melatonin  1 mg tablet, Take 1 tablet (1 mg) by mouth once daily at bedtime., Disp: , Rfl:     methocarbamol (Robaxin) 750 mg tablet, Take 1 tablet (750 mg) by mouth 3 times a day as needed for muscle spasms. PRN, Disp: 270 tablet, Rfl: 3    pravastatin (Pravachol) 10 mg tablet, Take 1 tablet (10 mg) by mouth once daily at bedtime., Disp: 90 tablet, Rfl: 3    pregabalin (Lyrica) 75 mg capsule, 1 capsule PO in the morning, 1 capsule in the afternoon, 2 capsules a bedtime, Disp: 112 capsule, Rfl: 2    promethazine (Phenergan) 12.5 mg tablet, Take 1 tablet (12.5 mg) by mouth every 8 hours if needed for nausea or vomiting., Disp: 90 tablet, Rfl: 3    rifAXIMin (Xifaxan) 550 mg tablet, Take 1 tablet (550 mg) by mouth 3 times a day., Disp: , Rfl:     rizatriptan MLT (Maxalt-MLT) 10 mg disintegrating tablet, Take 1 tablet (10 mg) by mouth 1 time if needed for migraine. May repeat in 2 hours if unresolved. Do not exceed 30 mg in 24 hours., Disp: 30 tablet, Rfl: 11    topiramate (Topamax) 100 mg tablet, Take 1 tablet (100 mg) by mouth once daily., Disp: 90 tablet, Rfl: 3    albuterol (Ventolin HFA) 90 mcg/actuation inhaler, Inhale 2 puffs every 6 hours if needed for wheezing or shortness of breath for up to 8 days., Disp: 8 g, Rfl: 0    predniSONE (Deltasone) 20 mg tablet, Take 3 tabs (60mg) daily for 3 days, then take 2 tabs (40mg) daily for 2 days, then take 1 tab (20mg) daily for 2 days., Disp: 15 tablet, Rfl: 0    valACYclovir (Valtrex) 1 gram tablet, 1 tablet every 12 hours for one day. Indicated for cold sore outbreak., Disp: 2 tablet, Rfl: 5     Review of Systems  Constitutional: Denies fever  HEENT: Denies ST, earache  CVS: Denies Chest pain  Pulmonary: Denies wheezing, SOB  GI: Denies N/V  : Denies dysuria  Musculoskeletal:  Denies myalgia  Neuro: Denies focal weakness or numbness.  Skin: Denies Rashes.  *Review of Systems is negative unless otherwise mentioned in HPI or ROS above.    Objective   /76   Pulse 87    Temp 36.3 °C (97.4 °F)   Wt 96.2 kg (212 lb)   SpO2 98%   BMI 30.42 kg/m²  reviewed Body mass index is 30.42 kg/m².     Physical Exam  Constitutional: NAD.  Resting comfortably.  Head: Atraumatic, normocephalic.  Eyes: PERRLA. EOM intact. Non icteric. Noninjected conjunctiva.  ENT: moist oral mucosa. Nasal mucosa mildly edematous and erythematous. Posterior oropharynx nonerythematous with clear posterior pharyngeal streaking.  TM: Bilat TM nonerythematous, pearly grey, landmarks intact. EAC wnl bilat.  Lymph: No submandibular lymphadenopathy. Mild upper anterior chain lymphadenopathy. No mastoid tenderness.   Neck is supple. No meningismus.  Cardiac: Regular rate & rhythm.   Pulmonary: Lungs with good air flow but scattered wheezes.  GI: Soft, Nontender, nondistended.   Musculoskeletal: No peripheral edema.   Skin: No evidence of trauma. No rashes  Psych: Intact judgement and insight.    .Assessment/Plan   Problem List Items Addressed This Visit             ICD-10-CM    Lumbar stenosis with neurogenic claudication M48.062    Relevant Orders    Disability Placard     Other Visit Diagnoses         Codes    Bronchitis    -  Primary J40    Relevant Medications    predniSONE (Deltasone) 20 mg tablet    Other Relevant Orders    XR chest 2 views    Recurrent cold sores     B00.1    Relevant Medications    valACYclovir (Valtrex) 1 gram tablet    Chronic radicular low back pain     M54.16, G89.29    Relevant Orders    Disability Placard

## 2024-12-05 ENCOUNTER — TELEMEDICINE (OUTPATIENT)
Dept: PRIMARY CARE | Facility: CLINIC | Age: 64
End: 2024-12-05
Payer: COMMERCIAL

## 2024-12-05 DIAGNOSIS — H01.021 SQUAMOUS BLEPHARITIS OF UPPER EYELIDS OF BOTH EYES: Primary | ICD-10-CM

## 2024-12-05 DIAGNOSIS — H01.024 SQUAMOUS BLEPHARITIS OF UPPER EYELIDS OF BOTH EYES: Primary | ICD-10-CM

## 2024-12-05 PROCEDURE — 99213 OFFICE O/P EST LOW 20 MIN: CPT | Performed by: PHYSICIAN ASSISTANT

## 2024-12-05 PROCEDURE — 1036F TOBACCO NON-USER: CPT | Performed by: PHYSICIAN ASSISTANT

## 2024-12-05 NOTE — PROGRESS NOTES
An interactive audio and video telecommunication system which permits real time communications between the patient (at the originating site) and provider (at the distant site) was utilized to provide this telehealth service.   Verbal consent was requested and obtained from Lorraine Trivedi on this date, 12/05/24 for a telehealth visit.     Subjective    Lorraine Trivedi is a 64 y.o. year old female patient presenting for virtual visit   Chief Complaint   Patient presents with    Eye Problem      Lorraine presents virtually due to bad weather for sick visit. Concern for dry, painful, red eyes.     Saturday, 5 days ago, eyelids bilat, upper eyelids and lash lines became erythematous and inflammed extending to lateral canthus and beyond.   Uses oculitie wipes. Washing twice a day with soap and water, warm compression. Has been improving after she stopped washing with soap. Used lotion once, but it burned.     Patient Active Problem List   Diagnosis    Occipital neuralgia    Cervical myofascial pain syndrome    Chronic lumbar radiculopathy    Dyslipidemia    GERD (gastroesophageal reflux disease)    History of cold sores    Irritable bowel syndrome with diarrhea    Peroneal tendon tear    Pes cavus, congenital    Vertigo    Nausea in adult    Otalgia of both ears    Migraine without aura and without status migrainosus, not intractable    Lumbar stenosis with neurogenic claudication    Facet arthropathy, cervical    Cervical radiculopathy    Postlaminectomy syndrome, cervical region       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    Irritable bowel syndrome     Occipital neuralgia     Radiculopathy, lumbar region 07/22/2015    Chronic  radicular lumbar pain    Seasickness 2023    Simple cyst of kidney 2023    Thoracic back pain 2023    Thrush, oral 2023      Past Surgical History:   Procedure Laterality Date    EYE SURGERY  2015    Eye Surgery Lasik    OTHER SURGICAL HISTORY  2020    Neck surgery - neck fusion    OTHER SURGICAL HISTORY Left 2020    Foot surgery x3    OTHER SURGICAL HISTORY  2018    Ankle surgery    TUBAL LIGATION  2015    Tubal Ligation      Family History   Problem Relation Name Age of Onset    Diabetes Mother      Breast cancer Mother          mastectomy    Stroke Mother          x2    Cancer Father Pete     Alcohol abuse Father Pete     Stomach cancer Father Pete     Heart attack Brother  50    Heart attack Mother's Brother      Heart attack Maternal Grandfather      Other (pacemaker) Maternal Grandfather      Heart failure Other Grandmother     Heart failure Other grandparent       Social History     Tobacco Use    Smoking status: Former     Current packs/day: 0.00     Average packs/day: 1 pack/day for 35.0 years (35.0 ttl pk-yrs)     Types: Cigarettes     Start date: 7/15/1980     Quit date: 7/15/2015     Years since quittin.4    Smokeless tobacco: Former   Substance Use Topics    Alcohol use: Never        Current Outpatient Medications:     albuterol (Ventolin HFA) 90 mcg/actuation inhaler, Inhale 2 puffs every 6 hours if needed for wheezing or shortness of breath for up to 8 days., Disp: 8 g, Rfl: 0    b complex 0.4 mg tablet, Take 1 tablet by mouth once daily., Disp: , Rfl:     bismuth subsalicylate (Pepto Bismol) 262 mg chewable tablet, Chew 2 tablets (524 mg) if needed for indigestion., Disp: , Rfl:     clonazePAM (KlonoPIN) 0.5 mg tablet, Take 1 tablet (0.5 mg) by mouth once daily at bedtime. To help sleep with pain and anxiety, Disp: 30 tablet, Rfl: 2    esomeprazole (NexIUM) 40 mg DR capsule, TAKE 1 CAPSULE BY MOUTH TWO TIMES A DAY BEFORE MEALS. DO NOT OPEN  CAPSULE., Disp: 180 capsule, Rfl: 1    fluticasone (Flonase) 50 mcg/actuation nasal spray, Administer 1 spray into each nostril once daily. Shake gently. Before first use, prime pump. After use, clean tip and replace cap., Disp: 16 g, Rfl: 11    hyoscyamine (Anaspaz, Levsin) 0.125 mg tablet, Take 1 tablet (0.125 mg) by mouth 4 times a day., Disp: 360 tablet, Rfl: 3    ibuprofen (IBU) 800 mg tablet, Take 1 tablet (800 mg) by mouth every 8 hours if needed for moderate pain (4 - 6)., Disp: 90 tablet, Rfl: 3    meclizine (Antivert) 12.5 mg tablet, Take 1 tablet (12.5 mg) by mouth every 8 hours if needed (For Vertigo)., Disp: 180 tablet, Rfl: 3    melatonin 1 mg tablet, Take 1 tablet (1 mg) by mouth once daily at bedtime., Disp: , Rfl:     methocarbamol (Robaxin) 750 mg tablet, Take 1 tablet (750 mg) by mouth 3 times a day as needed for muscle spasms. PRN, Disp: 270 tablet, Rfl: 3    pravastatin (Pravachol) 10 mg tablet, Take 1 tablet (10 mg) by mouth once daily at bedtime., Disp: 90 tablet, Rfl: 3    predniSONE (Deltasone) 20 mg tablet, Take 3 tabs (60mg) daily for 3 days, then take 2 tabs (40mg) daily for 2 days, then take 1 tab (20mg) daily for 2 days., Disp: 15 tablet, Rfl: 0    pregabalin (Lyrica) 75 mg capsule, 1 capsule PO in the morning, 1 capsule in the afternoon, 2 capsules a bedtime, Disp: 112 capsule, Rfl: 2    promethazine (Phenergan) 12.5 mg tablet, Take 1 tablet (12.5 mg) by mouth every 8 hours if needed for nausea or vomiting., Disp: 90 tablet, Rfl: 3    rifAXIMin (Xifaxan) 550 mg tablet, Take 1 tablet (550 mg) by mouth 3 times a day., Disp: , Rfl:     rizatriptan MLT (Maxalt-MLT) 10 mg disintegrating tablet, Take 1 tablet (10 mg) by mouth 1 time if needed for migraine. May repeat in 2 hours if unresolved. Do not exceed 30 mg in 24 hours., Disp: 30 tablet, Rfl: 11    topiramate (Topamax) 100 mg tablet, Take 1 tablet (100 mg) by mouth once daily., Disp: 90 tablet, Rfl: 3    valACYclovir (Valtrex) 1 gram  tablet, 1 tablet every 12 hours for one day. Indicated for cold sore outbreak., Disp: 2 tablet, Rfl: 5     Review of Systems    Review of Systems:   Constitutional: Denies fever  HEENT: Denies ST, earache  CVS: Denies Chest pain  Pulmonary: Denies wheezing, SOB  GI: Denies N/V  : Denies dysuria  Musculoskeletal:  Denies myalgia  Neuro: Denies focal weakness or numbness.  Skin: Denies Rashes.  *Review of Systems is negative unless otherwise mentioned in HPI or ROS above.     Objective    VITALS  Pt does not have vitals available at time of visit.    Exam       Limited physical exam in virtual platform  Nontoxic. No acute distress.  Nonlabored breathing.  Mild erythema minimal edema bilat upper eyelids  Assessment/Plan      Problem List Items Addressed This Visit    None  Visit Diagnoses       Squamous blepharitis of upper eyelids of both eyes    -  Primary           Gently cleanse twice daily, cover upper eyelid with thin layer of vaseline to protect skin

## 2024-12-17 ENCOUNTER — TELEPHONE (OUTPATIENT)
Dept: PRIMARY CARE | Facility: CLINIC | Age: 64
End: 2024-12-17
Payer: COMMERCIAL

## 2024-12-17 NOTE — TELEPHONE ENCOUNTER
Lorraine called.  Her eyelids are still red, swollen, and painful.  She is hoping to be able to be seen soon in-person since her last appointment was virtual due to the snow.

## 2024-12-18 ENCOUNTER — HOSPITAL ENCOUNTER (OUTPATIENT)
Dept: RADIOLOGY | Facility: HOSPITAL | Age: 64
Discharge: HOME | End: 2024-12-18
Payer: COMMERCIAL

## 2024-12-18 ENCOUNTER — OFFICE VISIT (OUTPATIENT)
Dept: PRIMARY CARE | Facility: CLINIC | Age: 64
End: 2024-12-18
Payer: COMMERCIAL

## 2024-12-18 VITALS
WEIGHT: 228 LBS | TEMPERATURE: 97.4 F | SYSTOLIC BLOOD PRESSURE: 128 MMHG | BODY MASS INDEX: 32.64 KG/M2 | HEART RATE: 71 BPM | HEIGHT: 70 IN | DIASTOLIC BLOOD PRESSURE: 78 MMHG | OXYGEN SATURATION: 98 %

## 2024-12-18 DIAGNOSIS — H01.006 BLEPHARITIS OF BOTH EYES, UNSPECIFIED EYELID, UNSPECIFIED TYPE: Primary | ICD-10-CM

## 2024-12-18 DIAGNOSIS — H02.889 DRY EYE SYNDROME DUE TO MEIBOMIAN GLAND DYSFUNCTION: ICD-10-CM

## 2024-12-18 DIAGNOSIS — H01.003 BLEPHARITIS OF BOTH EYES, UNSPECIFIED EYELID, UNSPECIFIED TYPE: Primary | ICD-10-CM

## 2024-12-18 DIAGNOSIS — M54.12 RADICULOPATHY, CERVICAL REGION: ICD-10-CM

## 2024-12-18 DIAGNOSIS — H04.129 DRY EYE SYNDROME DUE TO MEIBOMIAN GLAND DYSFUNCTION: ICD-10-CM

## 2024-12-18 DIAGNOSIS — M54.81 OCCIPITAL NEURALGIA OF RIGHT SIDE: ICD-10-CM

## 2024-12-18 PROCEDURE — 72141 MRI NECK SPINE W/O DYE: CPT

## 2024-12-18 PROCEDURE — 99213 OFFICE O/P EST LOW 20 MIN: CPT | Performed by: PHYSICIAN ASSISTANT

## 2024-12-18 PROCEDURE — 3008F BODY MASS INDEX DOCD: CPT | Performed by: PHYSICIAN ASSISTANT

## 2024-12-18 PROCEDURE — 72141 MRI NECK SPINE W/O DYE: CPT | Performed by: RADIOLOGY

## 2024-12-18 PROCEDURE — 1036F TOBACCO NON-USER: CPT | Performed by: PHYSICIAN ASSISTANT

## 2024-12-18 RX ORDER — PREDNISONE 20 MG/1
TABLET ORAL
Qty: 15 TABLET | Refills: 0 | Status: SHIPPED | OUTPATIENT
Start: 2024-12-18

## 2024-12-18 RX ORDER — POLYMYXIN B SULFATE AND TRIMETHOPRIM 1; 10000 MG/ML; [USP'U]/ML
1 SOLUTION OPHTHALMIC EVERY 6 HOURS
Qty: 10 ML | Refills: 0 | Status: SHIPPED | OUTPATIENT
Start: 2024-12-18 | End: 2024-12-23

## 2024-12-18 ASSESSMENT — PATIENT HEALTH QUESTIONNAIRE - PHQ9
SUM OF ALL RESPONSES TO PHQ9 QUESTIONS 1 AND 2: 0
2. FEELING DOWN, DEPRESSED OR HOPELESS: NOT AT ALL
1. LITTLE INTEREST OR PLEASURE IN DOING THINGS: NOT AT ALL

## 2024-12-18 NOTE — PROGRESS NOTES
Subjective     HPI   Lorraine Trivedi is a 64 y.o. year old female patient with presenting to clinic with concern for   Chief Complaint   Patient presents with    Eye Pain     swelling       Blepharitis. Clogged meimobian glands, R lower lash line evident. Hx dry eye since lasik surgery. Has been using eye lash cleaning wipes. Vaseline on lashlines with minimal benefit. Has been using warm compresses per ophtho recommendation.  Recommend  ATB drops at this point. Has improved since virtual visit, erythema no longer extending past lateral canthus.     Increased neck pain, worsened since MRI. Requests steroids for occipital neuralgia flare.    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    Irritable bowel syndrome     Occipital neuralgia     Radiculopathy, lumbar region 07/22/2015    Chronic radicular lumbar pain    Seasickness 01/26/2023    Simple cyst of kidney 01/26/2023    Thoracic back pain 01/26/2023    Thrush, oral 01/26/2023      Past Surgical History:   Procedure Laterality Date    EYE SURGERY   2015    Eye Surgery Lasik    OTHER SURGICAL HISTORY  2020    Neck surgery - neck fusion    OTHER SURGICAL HISTORY Left 2020    Foot surgery x3    OTHER SURGICAL HISTORY  2018    Ankle surgery    TUBAL LIGATION  2015    Tubal Ligation      Family History   Problem Relation Name Age of Onset    Diabetes Mother      Breast cancer Mother          mastectomy    Stroke Mother          x2    Cancer Father Pete     Alcohol abuse Father Pete     Stomach cancer Father Pete     Heart attack Brother  50    Heart attack Mother's Brother      Heart attack Maternal Grandfather      Other (pacemaker) Maternal Grandfather      Heart failure Other Grandmother     Heart failure Other grandparent       Social History     Tobacco Use    Smoking status: Former     Current packs/day: 0.00     Average packs/day: 1 pack/day for 35.0 years (35.0 ttl pk-yrs)     Types: Cigarettes     Start date: 7/15/1980     Quit date: 7/15/2015     Years since quittin.4    Smokeless tobacco: Former   Substance Use Topics    Alcohol use: Never        Current Outpatient Medications:     albuterol (Ventolin HFA) 90 mcg/actuation inhaler, Inhale 2 puffs every 6 hours if needed for wheezing or shortness of breath for up to 8 days., Disp: 8 g, Rfl: 0    b complex 0.4 mg tablet, Take 1 tablet by mouth once daily., Disp: , Rfl:     bismuth subsalicylate (Pepto Bismol) 262 mg chewable tablet, Chew 2 tablets (524 mg) if needed for indigestion., Disp: , Rfl:     clonazePAM (KlonoPIN) 0.5 mg tablet, Take 1 tablet (0.5 mg) by mouth once daily at bedtime. To help sleep with pain and anxiety, Disp: 30 tablet, Rfl: 2    esomeprazole (NexIUM) 40 mg DR capsule, TAKE 1 CAPSULE BY MOUTH TWO TIMES A DAY BEFORE MEALS. DO NOT OPEN CAPSULE., Disp: 180 capsule, Rfl: 1    fluticasone (Flonase) 50 mcg/actuation nasal spray, Administer 1 spray into each nostril once daily. Shake gently. Before first use, prime pump. After use, clean tip and replace  cap., Disp: 16 g, Rfl: 11    hyoscyamine (Anaspaz, Levsin) 0.125 mg tablet, Take 1 tablet (0.125 mg) by mouth 4 times a day., Disp: 360 tablet, Rfl: 3    ibuprofen (IBU) 800 mg tablet, Take 1 tablet (800 mg) by mouth every 8 hours if needed for moderate pain (4 - 6)., Disp: 90 tablet, Rfl: 3    meclizine (Antivert) 12.5 mg tablet, Take 1 tablet (12.5 mg) by mouth every 8 hours if needed (For Vertigo)., Disp: 180 tablet, Rfl: 3    melatonin 1 mg tablet, Take 1 tablet (1 mg) by mouth once daily at bedtime., Disp: , Rfl:     methocarbamol (Robaxin) 750 mg tablet, Take 1 tablet (750 mg) by mouth 3 times a day as needed for muscle spasms. PRN, Disp: 270 tablet, Rfl: 3    pravastatin (Pravachol) 10 mg tablet, Take 1 tablet (10 mg) by mouth once daily at bedtime., Disp: 90 tablet, Rfl: 3    pregabalin (Lyrica) 75 mg capsule, 1 capsule PO in the morning, 1 capsule in the afternoon, 2 capsules a bedtime, Disp: 112 capsule, Rfl: 2    promethazine (Phenergan) 12.5 mg tablet, Take 1 tablet (12.5 mg) by mouth every 8 hours if needed for nausea or vomiting., Disp: 90 tablet, Rfl: 3    rifAXIMin (Xifaxan) 550 mg tablet, Take 1 tablet (550 mg) by mouth 3 times a day., Disp: , Rfl:     rizatriptan MLT (Maxalt-MLT) 10 mg disintegrating tablet, Take 1 tablet (10 mg) by mouth 1 time if needed for migraine. May repeat in 2 hours if unresolved. Do not exceed 30 mg in 24 hours., Disp: 30 tablet, Rfl: 11    topiramate (Topamax) 100 mg tablet, Take 1 tablet (100 mg) by mouth once daily., Disp: 90 tablet, Rfl: 3    valACYclovir (Valtrex) 1 gram tablet, 1 tablet every 12 hours for one day. Indicated for cold sore outbreak., Disp: 2 tablet, Rfl: 5    polymyxin B sulf-trimethoprim (Polytrim) ophthalmic solution, Administer 1 drop into both eyes every 6 hours for 5 days., Disp: 10 mL, Rfl: 0    predniSONE (Deltasone) 20 mg tablet, Take 3 tabs (60mg) daily for 3 days, then take 2 tabs (40mg) daily for 2 days, then take 1 tab (20mg) daily for 2  "days., Disp: 15 tablet, Rfl: 0     Review of Systems  Constitutional: Denies fever  HEENT: Denies ST, earache  CVS: Denies Chest pain  Pulmonary: Denies wheezing, SOB  GI: Denies N/V  : Denies dysuria  Musculoskeletal:  Denies myalgia  Neuro: Denies focal weakness or numbness.  Skin: Denies Rashes.  *Review of Systems is negative unless otherwise mentioned in HPI or ROS above.    Objective   /78   Pulse 71   Temp 36.3 °C (97.4 °F)   Ht 1.778 m (5' 10\")   Wt 103 kg (228 lb)   SpO2 98%   BMI 32.71 kg/m²  reviewed Body mass index is 32.71 kg/m².     Physical Exam  Constitutional: NAD.  Resting comfortably.  Head: Atraumatic, normocephalic.  ENT: Moist oral mucosa. Nasal mucosa wnl. Mild edema and erythema upper eyelids externally bilat R>L, lower lids unaffected. Inflamed, congested upper and lower meimobian glands noted without any visible hordeolum present.   Cardiac: Regular rate & rhythm.   Pulmonary: Lungs clear bilat  GI: Soft, Nontender, nondistended.   Musculoskeletal: No peripheral edema.   Skin: No evidence of trauma. No rashes  Psych: Intact judgement and insight.    .Assessment/Plan   Problem List Items Addressed This Visit             ICD-10-CM    Occipital neuralgia M54.81    Relevant Medications    predniSONE (Deltasone) 20 mg tablet    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 H04.129, H02.889     Other Visit Diagnoses         Codes    Blepharitis of both eyes, unspecified eyelid, unspecified type    -  Primary H01.003, H01.006    Relevant Medications    polymyxin B sulf-trimethoprim (Polytrim) ophthalmic solution            "

## 2024-12-23 ENCOUNTER — CLINICAL SUPPORT (OUTPATIENT)
Dept: URGENT CARE | Facility: URGENT CARE | Age: 64
End: 2024-12-23
Payer: COMMERCIAL

## 2024-12-23 VITALS
SYSTOLIC BLOOD PRESSURE: 133 MMHG | TEMPERATURE: 97.7 F | HEART RATE: 64 BPM | DIASTOLIC BLOOD PRESSURE: 76 MMHG | RESPIRATION RATE: 18 BRPM | BODY MASS INDEX: 31.03 KG/M2 | OXYGEN SATURATION: 99 % | WEIGHT: 216.27 LBS

## 2024-12-23 DIAGNOSIS — R32 URINARY INCONTINENCE, UNSPECIFIED TYPE: ICD-10-CM

## 2024-12-23 DIAGNOSIS — R35.0 URINARY FREQUENCY: ICD-10-CM

## 2024-12-23 DIAGNOSIS — N39.0 URINARY TRACT INFECTION WITHOUT HEMATURIA, SITE UNSPECIFIED: Primary | ICD-10-CM

## 2024-12-23 DIAGNOSIS — R30.0 DYSURIA: ICD-10-CM

## 2024-12-23 PROBLEM — B37.0 ORAL THRUSH: Status: ACTIVE | Noted: 2024-12-23

## 2024-12-23 LAB
POC APPEARANCE, URINE: CLEAR
POC BILIRUBIN, URINE: NEGATIVE
POC BLOOD, URINE: NEGATIVE
POC COLOR, URINE: NORMAL
POC GLUCOSE, URINE: NEGATIVE MG/DL
POC KETONES, URINE: NEGATIVE MG/DL
POC LEUKOCYTES, URINE: NEGATIVE
POC NITRITE,URINE: NEGATIVE
POC PH, URINE: 6 PH
POC PROTEIN, URINE: NEGATIVE MG/DL
POC SPECIFIC GRAVITY, URINE: <=1.005
POC UROBILINOGEN, URINE: 0.2 EU/DL

## 2024-12-23 PROCEDURE — 99070 SPECIAL SUPPLIES PHYS/QHP: CPT | Performed by: FAMILY MEDICINE

## 2024-12-23 PROCEDURE — 81003 URINALYSIS AUTO W/O SCOPE: CPT | Performed by: FAMILY MEDICINE

## 2024-12-23 PROCEDURE — 99213 OFFICE O/P EST LOW 20 MIN: CPT | Performed by: FAMILY MEDICINE

## 2024-12-23 PROCEDURE — 87086 URINE CULTURE/COLONY COUNT: CPT

## 2024-12-23 RX ORDER — PHENAZOPYRIDINE HYDROCHLORIDE 95 MG/1
TABLET ORAL
Qty: 10 TABLET | Refills: 0 | Status: SHIPPED | OUTPATIENT
Start: 2024-12-23

## 2024-12-23 RX ORDER — PHENAZOPYRIDINE HYDROCHLORIDE 95 MG/1
TABLET ORAL
Qty: 10 TABLET | Refills: 0 | Status: SHIPPED | OUTPATIENT
Start: 2024-12-23 | End: 2024-12-23 | Stop reason: ENTERED-IN-ERROR

## 2024-12-23 RX ORDER — NITROFURANTOIN 25; 75 MG/1; MG/1
100 CAPSULE ORAL 2 TIMES DAILY
Qty: 10 CAPSULE | Refills: 0 | Status: SHIPPED | OUTPATIENT
Start: 2024-12-23 | End: 2024-12-28

## 2024-12-23 ASSESSMENT — PAIN SCALES - GENERAL: PAINLEVEL_OUTOF10: 7

## 2024-12-24 NOTE — PROGRESS NOTES
Subjective   Patient ID: Lorraine Trivedi is a 64 y.o. female.    HPI: 64-year-old female presents with a complaint of back pain, lower abdominal discomfort, dysuria, urgency, incontinence that began today.      History provided by:  Patient   used: No        The following portions of the chart were reviewed this encounter and updated as appropriate:  Tobacco  Allergies  Meds  Problems  Med Hx  Surg Hx  Fam Hx         Review of Systems   Constitutional:  Negative for chills and fever.   HENT:  Negative for congestion, ear pain, rhinorrhea and sore throat.    Respiratory:  Negative for cough, shortness of breath and wheezing.    Gastrointestinal:  Negative for abdominal pain, constipation, diarrhea, nausea and vomiting.   Genitourinary:  Positive for dysuria, enuresis (Incontinence), frequency and urgency. Negative for hematuria.   Musculoskeletal:  Positive for back pain.   Neurological:  Negative for headaches.     Objective   Physical Exam  Vital signs are reviewed. Alert and oriented x3 with normal mood and affect  Patient is well nourished, well-developed, alert and in no acute distress  External eyes, orbits, conjunctiva and eyelids are normal in appearance  Pupils are equal, round, reactive to light and accommodation, extraocular movements intact    External ears appear normal  External canals are normal in appearance  Right tympanic membrane is intact and pale gray in appearance  Left tympanic membrane is intact and pale gray in appearance  There is no middle ear effusion noted on the right  There is no middle ear effusion noted on the left  External appearance of the nose is normal  Nasal mucosa, septum, turbinates are pink in appearance  There is no nasal discharge in both nares    Oral mucosa is uniformly pink and moist  Palate is pink, symmetric and intact  Tongue is moist, mobile and midline  Posterior pharynx not erythematous with no concretions or exudates present  No cervical  lymphadenopathy palpated    Heart has regular rate and rhythm. No murmurs, rubs or gallops are auscultated at this exam.    Respiratory rate rhythm and effort are normal. Breath sounds bilaterally are clear on auscultation without crackles, rhonchi, wheezes or friction rub.    Abdomen: Normal bowel sounds on auscultation. Soft, nontender without rebound or rigidity on palpation.  Complains of low back pain.  Reports tenderness over bladder.  Procedures    Assessment/Plan   Diagnoses and all orders for this visit:  Urinary tract infection without hematuria, site unspecified  -     nitrofurantoin, macrocrystal-monohydrate, (Macrobid) 100 mg capsule; Take 1 capsule (100 mg) by mouth 2 times a day for 5 days.  -     phenazopyridine (Urinary Pain Relief) 95 mg tablet; 2 tablets po three times daily until gone  Dysuria  -     POCT UA Automated manually resulted  -     Urine Culture  -     nitrofurantoin, macrocrystal-monohydrate, (Macrobid) 100 mg capsule; Take 1 capsule (100 mg) by mouth 2 times a day for 5 days.  -     phenazopyridine (Urinary Pain Relief) 95 mg tablet; 2 tablets po three times daily until gone  Urinary incontinence, unspecified type  -     nitrofurantoin, macrocrystal-monohydrate, (Macrobid) 100 mg capsule; Take 1 capsule (100 mg) by mouth 2 times a day for 5 days.  -     phenazopyridine (Urinary Pain Relief) 95 mg tablet; 2 tablets po three times daily until gone  Urinary frequency  -     nitrofurantoin, macrocrystal-monohydrate, (Macrobid) 100 mg capsule; Take 1 capsule (100 mg) by mouth 2 times a day for 5 days.  -     phenazopyridine (Urinary Pain Relief) 95 mg tablet; 2 tablets po three times daily until gone    Patient disposition: Home

## 2024-12-24 NOTE — PATIENT INSTRUCTIONS
You have a urinary tract infection.  This is also sometimes called a bladder infection.  Please take Macrobid as prescribed until gone  May use pyridium for pain as prescribed  Increase your oral fluids for the next 7-10 days  May use ibuprofen 200 mg, 2 tabs by mouth every 8 hours as needed for discomfort or fever.  May take Tylenol 325 mg, 2 tabs by mouth every 4-6 hours as needed for discomfort or fever  You had a urine dipstick done today. This test can give information about whether your symptoms are due to urinary tract infection. We also sent your urine for bacterial culture in the lab. This test can confirm that you have an infection and what the best antibiotic treatment would be.  We will notify you if a change in medicine is indicated based on the results of that test within 3-4 days of your visit.    If no improvement in 3-5 days follow-up with primary care provider  If you have blood in the urine or on the dipstick test you must follow-up with your primary doctor in 2 weeks to assure that this has stopped.    If fever greater 102 degrees Fahrenheit, chills, nausea, vomiting, increased abdominal pain, increased pain with urinating, increased blood in urine or severe flank pain please go to emergency department for further evaluation  This note was generated by voice recognition software. Minor transcription/grammatical errors may be present. Please call for clarification.

## 2024-12-25 LAB — BACTERIA UR CULT: NORMAL

## 2025-01-08 ASSESSMENT — ENCOUNTER SYMPTOMS
FREQUENCY: 1
DYSURIA: 1
SORE THROAT: 0
FEVER: 0
SHORTNESS OF BREATH: 0
NAUSEA: 0
COUGH: 0
VOMITING: 0
HEMATURIA: 0
HEADACHES: 0
ABDOMINAL PAIN: 0
CONSTIPATION: 0
DIARRHEA: 0
BACK PAIN: 1
RHINORRHEA: 0
CHILLS: 0
WHEEZING: 0

## 2025-01-14 ENCOUNTER — APPOINTMENT (OUTPATIENT)
Dept: PRIMARY CARE | Facility: CLINIC | Age: 65
End: 2025-01-14
Payer: COMMERCIAL

## 2025-01-14 VITALS
OXYGEN SATURATION: 98 % | BODY MASS INDEX: 30.71 KG/M2 | SYSTOLIC BLOOD PRESSURE: 108 MMHG | DIASTOLIC BLOOD PRESSURE: 60 MMHG | HEART RATE: 78 BPM | WEIGHT: 214 LBS

## 2025-01-14 DIAGNOSIS — M50.10 CERVICAL DISC DISORDER WITH RADICULOPATHY: ICD-10-CM

## 2025-01-14 DIAGNOSIS — G44.86 HEADACHE, CERVICOGENIC: ICD-10-CM

## 2025-01-14 DIAGNOSIS — E55.9 VITAMIN D DEFICIENCY: ICD-10-CM

## 2025-01-14 DIAGNOSIS — M54.81 BILATERAL OCCIPITAL NEURALGIA: ICD-10-CM

## 2025-01-14 DIAGNOSIS — B00.1 RECURRENT COLD SORES: ICD-10-CM

## 2025-01-14 DIAGNOSIS — Z12.31 SCREENING MAMMOGRAM FOR BREAST CANCER: ICD-10-CM

## 2025-01-14 DIAGNOSIS — G43.009 MIGRAINE WITHOUT AURA AND WITHOUT STATUS MIGRAINOSUS, NOT INTRACTABLE: ICD-10-CM

## 2025-01-14 DIAGNOSIS — Z00.00 ROUTINE GENERAL MEDICAL EXAMINATION AT A HEALTH CARE FACILITY: Primary | ICD-10-CM

## 2025-01-14 DIAGNOSIS — E78.5 DYSLIPIDEMIA: ICD-10-CM

## 2025-01-14 PROCEDURE — 99396 PREV VISIT EST AGE 40-64: CPT | Performed by: PHYSICIAN ASSISTANT

## 2025-01-14 PROCEDURE — 1036F TOBACCO NON-USER: CPT | Performed by: PHYSICIAN ASSISTANT

## 2025-01-14 RX ORDER — METHOCARBAMOL 750 MG/1
750 TABLET, FILM COATED ORAL 3 TIMES DAILY PRN
Qty: 90 TABLET | Refills: 11 | Status: SHIPPED | OUTPATIENT
Start: 2025-01-14

## 2025-01-14 RX ORDER — PRAVASTATIN SODIUM 10 MG/1
10 TABLET ORAL NIGHTLY
Qty: 90 TABLET | Refills: 3 | Status: SHIPPED | OUTPATIENT
Start: 2025-01-14 | End: 2025-01-14

## 2025-01-14 RX ORDER — TOPIRAMATE 100 MG/1
100 TABLET, FILM COATED ORAL DAILY
Qty: 90 TABLET | Refills: 3 | Status: SHIPPED | OUTPATIENT
Start: 2025-01-14 | End: 2025-01-14

## 2025-01-14 RX ORDER — TOPIRAMATE 100 MG/1
100 TABLET, FILM COATED ORAL DAILY
Qty: 30 TABLET | Refills: 11 | Status: SHIPPED | OUTPATIENT
Start: 2025-01-14

## 2025-01-14 RX ORDER — IBUPROFEN 800 MG/1
800 TABLET ORAL 3 TIMES DAILY
Qty: 90 TABLET | Refills: 3 | Status: SHIPPED | OUTPATIENT
Start: 2025-01-14

## 2025-01-14 RX ORDER — PREGABALIN 75 MG/1
CAPSULE ORAL
Qty: 112 CAPSULE | Refills: 2 | Status: SHIPPED | OUTPATIENT
Start: 2025-01-14 | End: 2025-04-14

## 2025-01-14 RX ORDER — IBUPROFEN 800 MG/1
800 TABLET ORAL 3 TIMES DAILY
COMMUNITY
End: 2025-01-14 | Stop reason: SDUPTHER

## 2025-01-14 RX ORDER — VALACYCLOVIR HYDROCHLORIDE 1 G/1
TABLET, FILM COATED ORAL
Qty: 2 TABLET | Refills: 5 | Status: SHIPPED | OUTPATIENT
Start: 2025-01-14

## 2025-01-14 RX ORDER — CLONAZEPAM 0.5 MG/1
0.5 TABLET ORAL NIGHTLY
Qty: 30 TABLET | Refills: 2 | Status: SHIPPED | OUTPATIENT
Start: 2025-01-14 | End: 2025-04-14

## 2025-01-14 RX ORDER — IBUPROFEN 800 MG/1
800 TABLET ORAL 3 TIMES DAILY
Qty: 90 TABLET | Refills: 3 | Status: SHIPPED | OUTPATIENT
Start: 2025-01-14 | End: 2025-01-14

## 2025-01-14 RX ORDER — METHYLPREDNISOLONE 4 MG/1
TABLET ORAL
Qty: 21 TABLET | Refills: 0 | Status: SHIPPED | OUTPATIENT
Start: 2025-01-14 | End: 2025-01-20

## 2025-01-14 RX ORDER — METHOCARBAMOL 750 MG/1
750 TABLET, FILM COATED ORAL 3 TIMES DAILY PRN
Qty: 270 TABLET | Refills: 3 | Status: SHIPPED | OUTPATIENT
Start: 2025-01-14 | End: 2025-01-14

## 2025-01-14 RX ORDER — PRAVASTATIN SODIUM 10 MG/1
10 TABLET ORAL NIGHTLY
Qty: 30 TABLET | Refills: 11 | Status: SHIPPED | OUTPATIENT
Start: 2025-01-14 | End: 2026-01-14

## 2025-01-14 ASSESSMENT — ANXIETY QUESTIONNAIRES
IF YOU CHECKED OFF ANY PROBLEMS ON THIS QUESTIONNAIRE, HOW DIFFICULT HAVE THESE PROBLEMS MADE IT FOR YOU TO DO YOUR WORK, TAKE CARE OF THINGS AT HOME, OR GET ALONG WITH OTHER PEOPLE: NOT DIFFICULT AT ALL
5. BEING SO RESTLESS THAT IT IS HARD TO SIT STILL: NOT AT ALL
6. BECOMING EASILY ANNOYED OR IRRITABLE: NOT AT ALL
3. WORRYING TOO MUCH ABOUT DIFFERENT THINGS: NOT AT ALL
7. FEELING AFRAID AS IF SOMETHING AWFUL MIGHT HAPPEN: NOT AT ALL
1. FEELING NERVOUS, ANXIOUS, OR ON EDGE: NOT AT ALL
4. TROUBLE RELAXING: NOT AT ALL
GAD7 TOTAL SCORE: 0
2. NOT BEING ABLE TO STOP OR CONTROL WORRYING: NOT AT ALL

## 2025-01-14 NOTE — PROGRESS NOTES
Subjective     HPI   Lorraine Trivedi is a 64 y.o. year old female patient with presenting to clinic with concern for   Chief Complaint   Patient presents with    Annual Exam    Med Refill       UTI just before marivel, resolved    Blepharitis and dry eye ongoing for several weeks, better but not resolved. Following up with ophtho in a couple weeks    BP Readings from Last 5 Encounters:   01/14/25 108/60   12/23/24 133/76   12/18/24 128/78   11/07/24 136/76   10/08/24 126/78     HLD  -pravastatin    GERD  -esomeprazole 40    Migraines  -topamax 100 at bedtime  -maxalt MDT prn  -zofran   -phenergan 12.5    Anxiety  -clonazepam 0.5mg  Qty 30 tab per 30 days, OARRS review, CSA, UDS    Occipital Neuralgia  Cervical disc disorder w radiculopathy  -Lyrica 4 cap/day qty 112 cap per 30 days, OARRS review, CSA, UDS    Occipital neuralgia & Trigeminal neuralgia & migraines  -botox injections  Dr Eason Neuro  Occipital neuralgia    -pain injections  Dr Sotomayor Spinal Surgeon      Allergic rhinitis  -flonase    Recurrent cold sores  -valtrex 1g 2 tab bid PRN outbreak    IBS  Iqra Sosa  -Xifaxan  -anaspaz (not extended release)    Vertigo  -meclizine 12.5  -zofran     Polyarthralgia and stiffness  Tested negative for autoimmune diseases 1/2024    Left Knee-   arthroscopic debridement summer 2024 for miniscus   [facility]     Plantar fasciitis.   Continues physical therapy    Cardiology   Dr Trujillo 2022  Cardiology testing for preop    Specialists  Dr Eason Neuro- migraines- botox injections Trigeminal neuralgia  Mickisalty Sosa [facility]- IBS (Anaspaz, not XR) and Xifaxan  Dr Sotomayor Spinal Surgeon- occipital neuralgia injections  Tsering Horton- ENT- esophageal dysphagia  Cardiology 2022 Dr Trujillo-Sinus rhythm with 1st degree A-V block  Dr Acosta Pain mgt injections     On Lyrica & Klonopin.  PCP     OARRS:  Natasha Dunbar PA-C on 1/14/2025  2:43 PM  I have personally reviewed the OARRS report for Lorraine Trivedi. I  have considered the risks of abuse, dependence, addiction and diversion    Is the patient prescribed a combination of a benzodiazepine and opioid?  No    Last Urine Drug Screen / ordered today: No  Recent Results (from the past 8760 hours)   Confirmation Opiate/Opioid/Benzo Prescription Compliance    Collection Time: 05/24/24 10:18 AM   Result Value Ref Range    Clonazepam <25 <25 ng/mL    7-Aminoclonazepam 70 (H) <25 ng/mL    Alprazolam <25 <25 ng/mL    Alpha-Hydroxyalprazolam <25 <25 ng/mL    Midazolam <25 <25 ng/mL    Alpha-Hydroxymidazolam <25 <25 ng/mL    Chlordiazepoxide <25 <25 ng/mL    Diazepam <25 <25 ng/mL    Nordiazepam <25 <25 ng/mL    Temazepam <25 <25 ng/mL    Oxazepam <25 <25 ng/mL    Lorazepam <25 <25 ng/mL    Methadone <25 <25 ng/mL    EDDP <25 <25 ng/mL    6-Acetylmorphine <25 <25 ng/mL    Codeine <50 <50 ng/mL    Hydrocodone <25 <25 ng/mL    Hydromorphone <25 <25 ng/mL    Morphine  <50 <50 ng/mL    Norhydrocodone <25 <25 ng/mL    Noroxycodone <25 <25 ng/mL    Oxycodone <25 <25 ng/mL    Oxymorphone <25 <25 ng/mL    Fentanyl <2.5 <2.5 ng/mL    Norfentanyl <2.5 <2.5 ng/mL    Tramadol <50 <50 ng/mL    O-Desmethyltramadol <50 <50 ng/mL    Zolpidem <25 <25 ng/mL    Zolpidem Metabolite (ZCA) <25 <25 ng/mL   Screen Opiate/Opioid/Benzo Prescription Compliance    Collection Time: 05/24/24 10:18 AM   Result Value Ref Range    Creatinine, Urine Random 16.3 (L) 20.0 - 320.0 mg/dL    Amphetamine Screen, Urine Presumptive Negative Presumptive Negative    Barbiturate Screen, Urine Presumptive Negative Presumptive Negative    Cannabinoid Screen, Urine Presumptive Negative Presumptive Negative    Cocaine Metabolite Screen, Urine Presumptive Negative Presumptive Negative    PCP Screen, Urine Presumptive Negative Presumptive Negative     Results are as expected.         Controlled Substance Agreement:  Date of the Last Agreement: 4/16/24  Reviewed Controlled Substance Agreement including but not limited to the  benefits, risks, and alternatives to treatment with a Controlled Substance medication(s).    Lyrica:  What is the patient's goal of therapy? pain relief, improved mobility and quality of life    Is this being achieved with current treatment? Yes    Activities of Daily Living:  Is your overall impression that this patient is benefiting (symptom reduction outweighs side effects) from Lyrica therapy? Yes     BZD  Clonazepam:   improvement of debilitating effects of anxiety  Is this being achieved with current treatment? Yes    RIO 7 0    Activities of Daily Living:  Is your overall impression that this patient is benefiting (symptom reduction outweighs side effects) from BZD therapy? Yes     1. Physical Functioning: Better  2. Family Relationship: Same  3. Social Relationship: Same  4. Mood: Better  5. Sleep Patterns: Better  6. Overall Function: Better      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    Oral thrush       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    Irritable bowel syndrome     Occipital neuralgia     Radiculopathy, lumbar region  2015    Chronic radicular lumbar pain    Seasickness 2023    Simple cyst of kidney 2023    Thoracic back pain 2023    Thrush, oral 2023      Past Surgical History:   Procedure Laterality Date    EYE SURGERY  2015    Eye Surgery Lasik    OTHER SURGICAL HISTORY  2020    Neck surgery - neck fusion    OTHER SURGICAL HISTORY Left 2020    Foot surgery x3    OTHER SURGICAL HISTORY  2018    Ankle surgery    TUBAL LIGATION  2015    Tubal Ligation      Family History   Problem Relation Name Age of Onset    Diabetes Mother      Breast cancer Mother          mastectomy    Stroke Mother          x2    Cancer Father Pete     Alcohol abuse Father Pete     Stomach cancer Father Pete     Heart attack Brother  50    Heart attack Mother's Brother      Heart attack Maternal Grandfather      Other (pacemaker) Maternal Grandfather      Heart failure Other Grandmother     Heart failure Other grandparent       Social History     Tobacco Use    Smoking status: Former     Current packs/day: 0.00     Average packs/day: 1 pack/day for 35.0 years (35.0 ttl pk-yrs)     Types: Cigarettes     Start date: 7/15/1980     Quit date: 7/15/2015     Years since quittin.5    Smokeless tobacco: Former   Substance Use Topics    Alcohol use: Never        Current Outpatient Medications:     b complex 0.4 mg tablet, Take 1 tablet by mouth once daily., Disp: , Rfl:     bismuth subsalicylate (Pepto Bismol) 262 mg chewable tablet, Chew 2 tablets (524 mg) if needed for indigestion., Disp: , Rfl:     esomeprazole (NexIUM) 40 mg DR capsule, TAKE 1 CAPSULE BY MOUTH TWO TIMES A DAY BEFORE MEALS. DO NOT OPEN CAPSULE., Disp: 180 capsule, Rfl: 1    fluticasone (Flonase) 50 mcg/actuation nasal spray, Administer 1 spray into each nostril once daily. Shake gently. Before first use, prime pump. After use, clean tip and replace cap., Disp: 16 g, Rfl: 11    hyoscyamine (Anaspaz, Levsin) 0.125 mg tablet, Take 1  tablet (0.125 mg) by mouth 4 times a day., Disp: 360 tablet, Rfl: 3    meclizine (Antivert) 12.5 mg tablet, Take 1 tablet (12.5 mg) by mouth every 8 hours if needed (For Vertigo)., Disp: 180 tablet, Rfl: 3    melatonin 1 mg tablet, Take 1 tablet (1 mg) by mouth once daily at bedtime., Disp: , Rfl:     phenazopyridine (Urinary Pain Relief) 95 mg tablet, 2 tablets po three times daily until gone, Disp: 10 tablet, Rfl: 0    predniSONE (Deltasone) 20 mg tablet, Take 3 tabs (60mg) daily for 3 days, then take 2 tabs (40mg) daily for 2 days, then take 1 tab (20mg) daily for 2 days., Disp: 15 tablet, Rfl: 0    promethazine (Phenergan) 12.5 mg tablet, Take 1 tablet (12.5 mg) by mouth every 8 hours if needed for nausea or vomiting., Disp: 90 tablet, Rfl: 3    rifAXIMin (Xifaxan) 550 mg tablet, Take 1 tablet (550 mg) by mouth 3 times a day., Disp: , Rfl:     rizatriptan MLT (Maxalt-MLT) 10 mg disintegrating tablet, Take 1 tablet (10 mg) by mouth 1 time if needed for migraine. May repeat in 2 hours if unresolved. Do not exceed 30 mg in 24 hours., Disp: 30 tablet, Rfl: 11    albuterol (Ventolin HFA) 90 mcg/actuation inhaler, Inhale 2 puffs every 6 hours if needed for wheezing or shortness of breath for up to 8 days., Disp: 8 g, Rfl: 0    clonazePAM (KlonoPIN) 0.5 mg tablet, Take 1 tablet (0.5 mg) by mouth once daily at bedtime. To help sleep with pain and anxiety, Disp: 30 tablet, Rfl: 2    ibuprofen 800 mg tablet, Take 1 tablet (800 mg) by mouth 3 times a day. As needed, Disp: 90 tablet, Rfl: 3    methocarbamol (Robaxin) 750 mg tablet, Take 1 tablet (750 mg) by mouth 3 times a day as needed for muscle spasms. PRN, Disp: 270 tablet, Rfl: 3    methylPREDNISolone (Medrol Dospak) 4 mg tablets, Take as directed on package., Disp: 21 tablet, Rfl: 0    pravastatin (Pravachol) 10 mg tablet, Take 1 tablet (10 mg) by mouth once daily at bedtime., Disp: 90 tablet, Rfl: 3    pregabalin (Lyrica) 75 mg capsule, 1 capsule PO in the morning, 1  capsule in the afternoon, 2 capsules a bedtime, Disp: 112 capsule, Rfl: 2    topiramate (Topamax) 100 mg tablet, Take 1 tablet (100 mg) by mouth once daily., Disp: 90 tablet, Rfl: 3    valACYclovir (Valtrex) 1 gram tablet, 1 tablet every 12 hours for one day. Indicated for cold sore outbreak., Disp: 2 tablet, Rfl: 5     Review of Systems  Constitutional: Denies fever  HEENT: Denies ST, earache  CVS: Denies Chest pain  Pulmonary: Denies wheezing, SOB  GI: Denies N/V  : Denies dysuria  Musculoskeletal:  Denies myalgia  Neuro: Denies focal weakness or numbness.  Skin: Denies Rashes.  *Review of Systems is negative unless otherwise mentioned in HPI or ROS above.    Objective   /60   Pulse 78   Wt 97.1 kg (214 lb)   SpO2 98%   BMI 30.71 kg/m²  reviewed Body mass index is 30.71 kg/m².     Physical Exam  Constitutional: NAD.  Resting comfortably.  Head: Atraumatic, normocephalic.  ENT: Moist oral mucosa. Nasal mucosa wnl.   Cardiac: Regular rate & rhythm.   Pulmonary: Lungs clear bilat  GI: Soft, Nontender, nondistended.   Musculoskeletal: No peripheral edema.   Skin: No evidence of trauma. No rashes  Psych: Intact judgement and insight.    .Assessment/Plan   Problem List Items Addressed This Visit             ICD-10-CM    Occipital neuralgia M54.81    Relevant Medications    clonazePAM (KlonoPIN) 0.5 mg tablet    pregabalin (Lyrica) 75 mg capsule    Dyslipidemia E78.5    Relevant Medications    pravastatin (Pravachol) 10 mg tablet    Migraine without aura and without status migrainosus, not intractable G43.009    Relevant Medications    topiramate (Topamax) 100 mg tablet     Other Visit Diagnoses         Codes    Routine general medical examination at a health care facility    -  Primary Z00.00    Borderline hyperlipidemia     E78.5    Relevant Orders    CBC    Comprehensive Metabolic Panel    Lipid Panel    TSH with reflex to Free T4 if abnormal    Headache, cervicogenic     G44.86    Relevant Medications     methocarbamol (Robaxin) 750 mg tablet    Cervical disc disorder with radiculopathy     M50.10    Relevant Medications    pregabalin (Lyrica) 75 mg capsule    ibuprofen 800 mg tablet    methylPREDNISolone (Medrol Dospak) 4 mg tablets    Recurrent cold sores     B00.1    Relevant Medications    valACYclovir (Valtrex) 1 gram tablet    Vitamin D deficiency     E55.9    Relevant Orders    Vitamin D 25-Hydroxy,Total (for eval of Vitamin D levels)    Screening mammogram for breast cancer     Z12.31    Relevant Orders    BI mammo bilateral screening tomosynthesis

## 2025-01-15 ENCOUNTER — TELEPHONE (OUTPATIENT)
Dept: PRIMARY CARE | Facility: CLINIC | Age: 65
End: 2025-01-15
Payer: COMMERCIAL

## 2025-01-15 NOTE — TELEPHONE ENCOUNTER
Tristen Martinez let us know that Lorraine's insurance requires a PA on her pregabalin.  It is because she takes 1 capsule in AM, 1 capsule at noon, and 2 capsules at night.  They do not like that it's 2 capsules at night.  Do you want to change the prescription wording or to attempt the PA.

## 2025-01-25 ENCOUNTER — LAB (OUTPATIENT)
Dept: LAB | Facility: LAB | Age: 65
End: 2025-01-25
Payer: COMMERCIAL

## 2025-01-25 DIAGNOSIS — E55.9 VITAMIN D DEFICIENCY: ICD-10-CM

## 2025-01-25 DIAGNOSIS — Z00.00 ROUTINE GENERAL MEDICAL EXAMINATION AT A HEALTH CARE FACILITY: ICD-10-CM

## 2025-01-25 LAB
25(OH)D3 SERPL-MCNC: 31 NG/ML (ref 30–100)
ALBUMIN SERPL BCP-MCNC: 4.1 G/DL (ref 3.4–5)
ALP SERPL-CCNC: 111 U/L (ref 33–136)
ALT SERPL W P-5'-P-CCNC: 17 U/L (ref 7–45)
ANION GAP SERPL CALC-SCNC: 11 MMOL/L (ref 10–20)
AST SERPL W P-5'-P-CCNC: 16 U/L (ref 9–39)
BILIRUB SERPL-MCNC: 0.4 MG/DL (ref 0–1.2)
BUN SERPL-MCNC: 10 MG/DL (ref 6–23)
CALCIUM SERPL-MCNC: 9.1 MG/DL (ref 8.6–10.3)
CHLORIDE SERPL-SCNC: 110 MMOL/L (ref 98–107)
CHOLEST SERPL-MCNC: 211 MG/DL (ref 0–199)
CHOLESTEROL/HDL RATIO: 3.7
CO2 SERPL-SCNC: 23 MMOL/L (ref 21–32)
CREAT SERPL-MCNC: 0.7 MG/DL (ref 0.5–1.05)
EGFRCR SERPLBLD CKD-EPI 2021: >90 ML/MIN/1.73M*2
ERYTHROCYTE [DISTWIDTH] IN BLOOD BY AUTOMATED COUNT: 13.2 % (ref 11.5–14.5)
GLUCOSE SERPL-MCNC: 89 MG/DL (ref 74–99)
HCT VFR BLD AUTO: 40.6 % (ref 36–46)
HDLC SERPL-MCNC: 56.7 MG/DL
HGB BLD-MCNC: 13.1 G/DL (ref 12–16)
LDLC SERPL CALC-MCNC: 114 MG/DL
MCH RBC QN AUTO: 29.9 PG (ref 26–34)
MCHC RBC AUTO-ENTMCNC: 32.3 G/DL (ref 32–36)
MCV RBC AUTO: 93 FL (ref 80–100)
NON HDL CHOLESTEROL: 154 MG/DL (ref 0–149)
NRBC BLD-RTO: 0 /100 WBCS (ref 0–0)
PLATELET # BLD AUTO: 265 X10*3/UL (ref 150–450)
POTASSIUM SERPL-SCNC: 3.5 MMOL/L (ref 3.5–5.3)
PROT SERPL-MCNC: 6.4 G/DL (ref 6.4–8.2)
RBC # BLD AUTO: 4.38 X10*6/UL (ref 4–5.2)
SODIUM SERPL-SCNC: 140 MMOL/L (ref 136–145)
TRIGL SERPL-MCNC: 201 MG/DL (ref 0–149)
TSH SERPL-ACNC: 2.87 MIU/L (ref 0.44–3.98)
VLDL: 40 MG/DL (ref 0–40)
WBC # BLD AUTO: 4.3 X10*3/UL (ref 4.4–11.3)

## 2025-01-25 PROCEDURE — 82306 VITAMIN D 25 HYDROXY: CPT

## 2025-01-26 DIAGNOSIS — D72.819 LEUKOPENIA, UNSPECIFIED TYPE: Primary | ICD-10-CM

## 2025-02-04 ENCOUNTER — APPOINTMENT (OUTPATIENT)
Dept: PAIN MEDICINE | Facility: HOSPITAL | Age: 65
End: 2025-02-04
Payer: COMMERCIAL

## 2025-02-17 ENCOUNTER — TELEPHONE (OUTPATIENT)
Dept: GASTROENTEROLOGY | Facility: CLINIC | Age: 65
End: 2025-02-17
Payer: COMMERCIAL

## 2025-02-17 NOTE — TELEPHONE ENCOUNTER
Insurance not covering hyoscyamine 0.125. Left voicemail and sent [email or web address] message for patient that she can use the GoodRx discount at [facility] and it should be 19.75 per 120pills.

## 2025-03-04 ENCOUNTER — OFFICE VISIT (OUTPATIENT)
Dept: PAIN MEDICINE | Facility: HOSPITAL | Age: 65
End: 2025-03-04
Payer: COMMERCIAL

## 2025-03-04 DIAGNOSIS — M54.12 CERVICAL RADICULOPATHY: ICD-10-CM

## 2025-03-04 DIAGNOSIS — M54.16 LUMBAR RADICULOPATHY: ICD-10-CM

## 2025-03-04 PROCEDURE — 99214 OFFICE O/P EST MOD 30 MIN: CPT | Performed by: ANESTHESIOLOGY

## 2025-03-04 PROCEDURE — 99204 OFFICE O/P NEW MOD 45 MIN: CPT | Performed by: ANESTHESIOLOGY

## 2025-03-04 ASSESSMENT — PAIN - FUNCTIONAL ASSESSMENT: PAIN_FUNCTIONAL_ASSESSMENT: 0-10

## 2025-03-04 ASSESSMENT — PAIN SCALES - GENERAL: PAINLEVEL_OUTOF10: 6

## 2025-03-04 NOTE — PROGRESS NOTES
Chief Complaint   Patient presents with    Neck Pain     63 y/o female c/o neck and back pain     Subjective   Patient ID: Lorraine Trivedi is a 64 y.o. female with a past medical history of occipital neuralgia, chronic pain, cervical radiculopathy, lumbar spondylosis, lumbar radiculopathy, lumbar spinal stenosis with neurogenic claudication, who presents today for evaluation of her chronic neck and low back pain.  Regarding the pain in her neck, patient states that she feels most of the pain in the base of the neck and a little to the right shoulder.  She occasionally has shooting pain that starts in the neck and travels down the bilateral arms to the hands, along with numbness and tingling in the bilateral fingers 2-4.  This is often associated with dropping objects.  Pain is worse with prolonged sitting, and forward flexion of the neck.    Regarding pain in the back, patient states that it starts in the lower back and travels down the left thigh posteriorly to the knee.  Pain is worse with prolonged sitting, bending, lifting, and general activity.  Pain is described as aching in the back, and sharp shooting to the posterior thigh.  Patient states that she previously received cervical interlaminar epidural steroid injections and caudal epidural steroid injections for neck and back respectively, with greater than 75% pain relief lasting longer than 3 months. The pain causes significant stress in the patient's life, interfering with general activity, mood, ability to walk distances, ability to perform tasks at home and/or work. Patient participates in physical therapy, and continues to perform physician directed exercises at home. Patient denies any bowel or bladder incontinence, saddle anesthesia, weaknesses, or falls.    Review of Systems   13-point ROS done and negative except for HPI.     Current Outpatient Medications   Medication Instructions    albuterol (Ventolin HFA) 90 mcg/actuation inhaler 2 puffs,  inhalation, Every 6 hours PRN    b complex 0.4 mg tablet 1 tablet, Daily    bismuth subsalicylate (PEPTO BISMOL) 524 mg, As needed    clonazePAM (KLONOPIN) 0.5 mg, oral, Nightly, To help sleep with pain and anxiety    esomeprazole (NexIUM) 40 mg DR capsule TAKE 1 CAPSULE BY MOUTH TWO TIMES A DAY BEFORE MEALS. DO NOT OPEN CAPSULE.    fluticasone (Flonase) 50 mcg/actuation nasal spray 1 spray, Each Nostril, Daily, Shake gently. Before first use, prime pump. After use, clean tip and replace cap.    hyoscyamine (ANASPAZ, LEVSIN) 0.125 mg, oral, 4 times daily    ibuprofen 800 mg, oral, 3 times daily, As needed    meclizine (ANTIVERT) 12.5 mg, oral, Every 8 hours PRN    melatonin 1 mg, Nightly    methocarbamol (ROBAXIN) 750 mg, oral, 3 times daily PRN, PRN    phenazopyridine (Urinary Pain Relief) 95 mg tablet 2 tablets po three times daily until gone    pravastatin (PRAVACHOL) 10 mg, oral, Nightly    predniSONE (Deltasone) 20 mg tablet Take 3 tabs (60mg) daily for 3 days, then take 2 tabs (40mg) daily for 2 days, then take 1 tab (20mg) daily for 2 days.    pregabalin (Lyrica) 75 mg capsule 1 capsule PO in the morning, 1 capsule in the afternoon, 2 capsules a bedtime    promethazine (PHENERGAN) 12.5 mg, oral, Every 8 hours PRN    rifAXIMin (XIFAXAN) 550 mg, 3 times daily    rizatriptan MLT (MAXALT-MLT) 10 mg, oral, Once as needed, May repeat in 2 hours if unresolved. Do not exceed 30 mg in 24 hours.    topiramate (TOPAMAX) 100 mg, oral, Daily    valACYclovir (Valtrex) 1 gram tablet 1 tablet every 12 hours for one day. Indicated for cold sore outbreak.       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    Irritable bowel syndrome      Occipital neuralgia     Radiculopathy, lumbar region 07/22/2015    Chronic radicular lumbar pain    Seasickness 01/26/2023    Simple cyst of kidney 01/26/2023    Thoracic back pain 01/26/2023    Thrush, oral 01/26/2023        Past Surgical History:   Procedure Laterality Date    EYE SURGERY  04/20/2015    Eye Surgery Lasik    OTHER SURGICAL HISTORY  12/14/2020    Neck surgery - neck fusion    OTHER SURGICAL HISTORY Left 12/14/2020    Foot surgery x3    OTHER SURGICAL HISTORY  12/18/2018    Ankle surgery    TUBAL LIGATION  04/20/2015    Tubal Ligation        Family History   Problem Relation Name Age of Onset    Diabetes Mother      Breast cancer Mother          mastectomy    Stroke Mother          x2    Cancer Father Pete     Alcohol abuse Father Pete     Stomach cancer Father Pete     Heart attack Brother  50    Heart attack Mother's Brother      Heart attack Maternal Grandfather      Other (pacemaker) Maternal Grandfather      Heart failure Other Grandmother     Heart failure Other grandparent         Allergies   Allergen Reactions    Adhesive Tape-Silicones Rash and Hives        Objective     There were no vitals filed for this visit.     Physical Exam  General: NAD, well groomed, well nourished  Eyes: Non-icteric sclera, EOMI  Ears, Nose, Mouth, and Throat: External ears and nose appear to be without deformity or rash. No lesions or masses noted. Hearing is grossly intact.   Neck: Supple, trachea midline, no appreciable lumps or lymph nodes  Respiratory: Nonlabored breathing   Cardiovascular: No peripheral edema observed  Skin: No rashes or open lesions/ulcers identified on skin.  Psychiatric: Alert, orientation to person, place, and time. Cooperative.    Neurologic:   Cranial nerves grossly intact.   Strength: 5/5 and symmetric plantar/dorsiflexion   Sensation: Normal to light touch throughout; pinprick intact throughout.   DTRs:normal and symmetric throughout  Perez: absent  Clonus:  absent    Neck:  Palpation: Tenderness to palpation over cervical paraspinal muscles.  Spurling's test: Positive bilaterally.    Back:   Palpation: Tenderness to palpation over lumbar paraspinous muscles.   Straight leg raise: positive at 60 degrees on the left  ABRAHAM Maneuver does not reproduce pain bilaterally  Facet Loading test: present on lumbar spine  Hip: No pain over greater trochanters. and Pain not reproduced with hip internal/external rotation.     Imaging personally reviewed and independently interpreted:   MR cervical spine wo IV contrast 12/18/2024    Narrative  Interpreted By:  Kip Mesa,  STUDY:  MR CERVICAL SPINE WO IV CONTRAST;  12/18/2024 4:12 pm    INDICATION:  Signs/Symptoms:MRI CERVICAL,  ORDER IS SCANNED. ,M54.12  Radiculopathy, cervical region    COMPARISON:  May 2022.    ACCESSION NUMBER(S):  [id number]    ORDERING CLINICIAN:  FERNANDO HOUGH    TECHNIQUE:  The cervical spine was studied in the sagittal and axial planes  utilizing T1 and T2 weighted images.    FINDINGS:  The craniovertebral junction is normal. The cord is normal in size  and signal. The marrow signal is normal. Serial axial images reveal  the following: C2/C3  There is normal alignment and vertebral body height. The disc space  is normal. There is no evidence of canal or foraminal narrowing.  There is no evidence of bulging or herniated disc. C3/C4  Mild circumferential bulging intervertebral disc unchanged from the  previous exam. Mild bilateral facet hypertrophy. No measurable canal  or foraminal narrowing C4/C5  Previous anterior discectomy and interbody fusion. No residual canal  or foraminal narrowing C5/C6  Previous discectomy and anterior interbody fusion without residual  canal or foraminal narrowing C6/C7  Unchanged asymmetrical bulging intervertebral disc to the right with  slight flattening of the thecal sac but without measurable central  canal stenosis. No associated foraminal narrowing. C7/T1  There is  normal alignment and vertebral body height. The disc space  is normal. There is no evidence of canal or foraminal narrowing.  There is no evidence of bulging or herniated disc.    Impression  * There is no measurable change compared to the previous exam.    MACRO:  none    Signed by: Kip Mesa 12/19/2024 11:54 AM  Dictation workstation:   [id number]     No image results found.     1. Cervical radiculopathy  FL pain management    Epidural Steroid Injection           Assessment/Plan   Lorraine Trivedi is a 64 y.o. female with a past medical history of occipital neuralgia, chronic pain, cervical radiculopathy, lumbar spondylosis, lumbar radiculopathy, lumbar spinal stenosis with neurogenic claudication, who presents today for evaluation of her chronic neck and low back pain.  Given the patient's history and physical exam findings, patient's neck pain is most likely due to cervical stenosis and radiculopathy, while her neck pain is most likely due to lumbar spinal stenosis and disc herniation. Patient states that she previously received cervical interlaminar epidural steroid injections and caudal epidural steroid injections for neck and back respectively, with greater than 75% pain relief lasting longer than 3 months.    Plan:  - We will schedule patient for cervical interlaminar epidural steroid injections under fluoroscopy.  - We will also schedule patient for caudal epidural steroid injection under fluoroscopy about 2 to 4 weeks after her cervical injections.  - If patient does not have pain relief with her caudal injection, we will consider MRI lumbar spine.  The patient has already failed conservative therapies including medications such as acetaminophen, NSAIDs, and gabapentinoids; as well as physical therapy. Therefore, we discussed extensively the risks, benefits, and alternatives to the procedure. The patient's questions were addressed and answered in detail. The patient demonstrated understanding of the  procedure, and is amenable to proceeding with it. The Risks of the procedure that were discussed with the patient include but are not limited to the following: A lack of efficacy, transient worsening of pain, bleeding, infection, nerve injury, nerve damage, neuritis or sunburn sensation, worsening control of blood glucose from steroid medication, epidural hematoma, posterior puncture headache, and paralysis.      Follow up: As needed     The patient was invited to contact us back anytime with any questions or concerns and follow-up with us in the office as needed.     Diagnoses and all orders for this visit:  Cervical radiculopathy  -     FL pain management; Future  -     Epidural Steroid Injection; Future  Other orders  -     NPO Diet Except: Sips with meds; Effective now; Standing  -     Height and weight; Standing  -     Insert and maintain peripheral IV; Standing  -     Saline lock IV; Standing  -     Type And Screen; Standing  -     Adult diet Regular; Standing  -     Vital Signs; Standing  -     Notify physician - Standard Parameters; Standing  -     Continue IV fluids ordered pre-procedure; Standing  -     Prior to Discharge O2 Weaning; Standing  -     Pulse oximetry, continuous; Standing      This note was generated with the aid of dictation software, there may be typos despite my attempts at proofreading.     Bradley Lennon MD  Interventional Pain Medicine Fellow  [facility]

## 2025-03-12 NOTE — PROGRESS NOTES
Virtual or Telephone Consent    An interactive audio and video telecommunication system which permits real time communications between the patient (at the originating site) and provider (at the distant site) was utilized to provide this telehealth service.   Verbal consent was requested and obtained from Lorraine Trivedi on this date, 03/17/25 for a telehealth visit and the patient's location was confirmed at the time of the visit.   History Of Present Illness  Lorraine Trivedi is a 64 y.o. female presenting to GI clinic for follow up GERD, IBS.    Patient states she still have IBS flares from time to time, but not as often recently. She notes that when occipital neuralgia flares up, so does her IBS- she will have abdominal pain, fecal urgency, and diarrhea. She will use Pepto during flares wiith relief.  She is currently on hyoscyamine 4 times daily which controls her IBS well.    She is currently doing well on Nexium 40 mg daily, she sometimes does not take her second dose because she does not need it, but if she eats something that she thinks will bother her she will take the second dose in the evening.    Social History  She reports that she quit smoking about 9 years ago. Her smoking use included cigarettes. She started smoking about 44 years ago. She has a 35 pack-year smoking history. She has quit using smokeless tobacco. She reports that she does not drink alcohol and does not use drugs.  She does not take NSAIDs on a regular basis    Family History  Family History   Problem Relation Name Age of Onset    Diabetes Mother      Breast cancer Mother          mastectomy    Stroke Mother          x2    Cancer Father Pete     Alcohol abuse Father Pete     Stomach cancer Father Pete     Heart attack Brother  50    Heart attack Mother's Brother      Heart attack Maternal Grandfather      Other (pacemaker) Maternal Grandfather      Heart failure Other Grandmother     Heart failure Other grandparent      The patient does not  "have a FH of CRC. she does not have a FH of IBD    Review of Systems   Gastrointestinal:  Positive for abdominal pain (with flares) and diarrhea (with IBS flares).         Physical Exam  Abdominal:      Comments: Unable to assess via virtual visit   Neurological:      Mental Status: She is alert and oriented to person, place, and time.   Psychiatric:         Mood and Affect: Mood normal.         Behavior: Behavior normal.          Last Vital Signs  There were no vitals taken for this visit.     Relevant Results  Lab Results   Component Value Date    WBC 4.3 (L) 01/25/2025    HGB 13.1 01/25/2025    HCT 40.6 01/25/2025    MCV 93 01/25/2025       Lab Results   Component Value Date    GLUCOSE 89 01/25/2025    CALCIUM 9.1 01/25/2025     01/25/2025    K 3.5 01/25/2025    CO2 23 01/25/2025     (H) 01/25/2025    BUN 10 01/25/2025    CREATININE 0.70 01/25/2025       Lab Results   Component Value Date    ALT 17 01/25/2025    AST 16 01/25/2025    ALKPHOS 111 01/25/2025    BILITOT 0.4 01/25/2025     No results found for: \"IRON\", \"TIBC\", \"IRONSAT\", \"FERRITIN\", \"UJQYKRWQ32\", \"FOLATE\"  No results found for: \"CALPS\"     CRP   Date Value Ref Range Status   11/21/2022 0.32 mg/dL Final     Comment:     REF VALUE  < 1.00       No results found for: \"LIPASE\"  No results found for: \"HGBA1C\"     EGD/COLONOSCOPY/COLOGUARD  EGD 6/9/2021   with Dr. Meyer- Impression:              - Z-line irregular, 40 cm from the incisors. Biopsied.  - Gastritis. Biopsied.  - Normal duodenal bulb and second portion of the duodenum.  FINAL DIAGNOSIS   A.  STOMACH, BIOPSY:    --GASTRIC ANTRAL/OXYNTIC MUCOSA WITH REACTIVE GASTROPATHY AND FEATURES   SUGGESTIVE OF HEALED EROSION/ULCER   --NO HELICOBACTER PYLORI-LIKE ORGANISMS IDENTIFIED ON H&E STAINED SLIDES     B.  GASTROESOPHAGEAL JUNCTION, BIOPSY:    --MILDLY HYPERPLASTIC SQUAMOUS MUCOSA AND CARDIO OXYNTIC MUCOSA   --NO INTESTINAL METAPLASIA OR DYSPLASIA IDENTIFIED      Colonoscopy 6/9/2021 with " Dr. Meyer- Impression:              - Diverticulosis in the sigmoid colon.  - Internal hemorrhoids.  - The examination was otherwise normal on direct and retroflexion views.  - No specimens collected.      Assessment/Plan   Assessment & Plan  Gastroesophageal reflux disease, unspecified whether esophagitis present  Doing well on Nexium 2 times daily, continue.  Requesting 30-day supplies  Orders:    esomeprazole (NexIUM) 40 mg DR capsule; Take 1 capsule (40 mg) by mouth 2 times a day before meals. Do not open capsule.    Nausea in adult  Doing well on Levsin 4 times daily, continue  Orders:    hyoscyamine (Anaspaz, Levsin) 0.125 mg tablet; Take 1 tablet (0.125 mg) by mouth 4 times a day.    Irritable bowel syndrome with diarrhea  Doing well on Levsin 4 times daily, uses Pepto-Bismol for flares  Insurance did not cover Xifaxan in the past  Had side effects from Levbid  Notes that Imodium is ineffective for diarrheal flares  Orders:    hyoscyamine (Anaspaz, Levsin) 0.125 mg tablet; Take 1 tablet (0.125 mg) by mouth 4 times a day.        Follow-up yearly and as needed    TWAN Garcia-MARTHA  03/17/25

## 2025-03-17 ENCOUNTER — APPOINTMENT (OUTPATIENT)
Dept: GASTROENTEROLOGY | Facility: CLINIC | Age: 65
End: 2025-03-17
Payer: COMMERCIAL

## 2025-03-17 DIAGNOSIS — K58.0 IRRITABLE BOWEL SYNDROME WITH DIARRHEA: ICD-10-CM

## 2025-03-17 DIAGNOSIS — R11.0 NAUSEA IN ADULT: ICD-10-CM

## 2025-03-17 DIAGNOSIS — K21.9 GASTROESOPHAGEAL REFLUX DISEASE, UNSPECIFIED WHETHER ESOPHAGITIS PRESENT: ICD-10-CM

## 2025-03-17 PROBLEM — R73.9 HYPERGLYCEMIA: Status: ACTIVE | Noted: 2025-03-17

## 2025-03-17 PROCEDURE — 1036F TOBACCO NON-USER: CPT

## 2025-03-17 PROCEDURE — 99214 OFFICE O/P EST MOD 30 MIN: CPT

## 2025-03-17 RX ORDER — ESOMEPRAZOLE MAGNESIUM 40 MG/1
40 CAPSULE, DELAYED RELEASE ORAL
Qty: 60 CAPSULE | Refills: 11 | Status: SHIPPED | OUTPATIENT
Start: 2025-03-17

## 2025-03-17 RX ORDER — HYOSCYAMINE SULFATE 0.125 MG
0.12 TABLET ORAL 4 TIMES DAILY
Qty: 120 TABLET | Refills: 0 | Status: SHIPPED | OUTPATIENT
Start: 2025-03-17 | End: 2025-04-16

## 2025-03-17 ASSESSMENT — ENCOUNTER SYMPTOMS
DIARRHEA: 1
ABDOMINAL PAIN: 1

## 2025-03-17 NOTE — ASSESSMENT & PLAN NOTE
Doing well on Levsin 4 times daily, continue  Orders:    hyoscyamine (Anaspaz, Levsin) 0.125 mg tablet; Take 1 tablet (0.125 mg) by mouth 4 times a day.

## 2025-03-17 NOTE — PATIENT INSTRUCTIONS
It was nice seeing you again today!  Continue current regimen, hyoscyamine 4 times daily, Pepto as needed and Nexium  Colonoscopy up to date  Follow-up yearly and as needed

## 2025-03-17 NOTE — ASSESSMENT & PLAN NOTE
Doing well on Levsin 4 times daily, uses Pepto-Bismol for flares  Insurance did not cover Xifaxan in the past  Had side effects from Levbid  Notes that Imodium is ineffective for diarrheal flares  Orders:    hyoscyamine (Anaspaz, Levsin) 0.125 mg tablet; Take 1 tablet (0.125 mg) by mouth 4 times a day.

## 2025-03-17 NOTE — ASSESSMENT & PLAN NOTE
Doing well on Nexium 2 times daily, continue.  Requesting 30-day supplies  Orders:    esomeprazole (NexIUM) 40 mg DR capsule; Take 1 capsule (40 mg) by mouth 2 times a day before meals. Do not open capsule.

## 2025-03-21 ENCOUNTER — APPOINTMENT (OUTPATIENT)
Dept: OPHTHALMOLOGY | Facility: CLINIC | Age: 65
End: 2025-03-21
Payer: COMMERCIAL

## 2025-04-04 ENCOUNTER — APPOINTMENT (OUTPATIENT)
Facility: HOSPITAL | Age: 65
End: 2025-04-04
Payer: COMMERCIAL

## 2025-04-09 ENCOUNTER — APPOINTMENT (OUTPATIENT)
Facility: HOSPITAL | Age: 65
End: 2025-04-09
Payer: COMMERCIAL

## 2025-04-09 ENCOUNTER — HOSPITAL ENCOUNTER (OUTPATIENT)
Facility: HOSPITAL | Age: 65
Discharge: HOME | End: 2025-04-09
Payer: COMMERCIAL

## 2025-04-09 VITALS
TEMPERATURE: 98.2 F | BODY MASS INDEX: 29.4 KG/M2 | HEIGHT: 71 IN | DIASTOLIC BLOOD PRESSURE: 64 MMHG | SYSTOLIC BLOOD PRESSURE: 135 MMHG | WEIGHT: 210 LBS | HEART RATE: 66 BPM | OXYGEN SATURATION: 98 % | RESPIRATION RATE: 17 BRPM

## 2025-04-09 DIAGNOSIS — M54.12 CERVICAL RADICULOPATHY: ICD-10-CM

## 2025-04-09 PROCEDURE — 2550000001 HC RX 255 CONTRASTS: Performed by: ANESTHESIOLOGY

## 2025-04-09 PROCEDURE — 99152 MOD SED SAME PHYS/QHP 5/>YRS: CPT

## 2025-04-09 PROCEDURE — 2500000004 HC RX 250 GENERAL PHARMACY W/ HCPCS (ALT 636 FOR OP/ED): Performed by: ANESTHESIOLOGY

## 2025-04-09 PROCEDURE — 62321 NJX INTERLAMINAR CRV/THRC: CPT | Performed by: ANESTHESIOLOGY

## 2025-04-09 RX ORDER — METHYLPREDNISOLONE ACETATE 40 MG/ML
INJECTION, SUSPENSION INTRA-ARTICULAR; INTRALESIONAL; INTRAMUSCULAR; SOFT TISSUE
Status: COMPLETED | OUTPATIENT
Start: 2025-04-09 | End: 2025-04-09

## 2025-04-09 RX ORDER — MIDAZOLAM HYDROCHLORIDE 1 MG/ML
INJECTION, SOLUTION INTRAMUSCULAR; INTRAVENOUS
Status: COMPLETED | OUTPATIENT
Start: 2025-04-09 | End: 2025-04-09

## 2025-04-09 RX ORDER — LIDOCAINE HYDROCHLORIDE 5 MG/ML
INJECTION, SOLUTION INFILTRATION; INTRAVENOUS
Status: COMPLETED | OUTPATIENT
Start: 2025-04-09 | End: 2025-04-09

## 2025-04-09 RX ADMIN — IOHEXOL 1 ML: 240 INJECTION, SOLUTION INTRATHECAL; INTRAVASCULAR; INTRAVENOUS; ORAL at 13:09

## 2025-04-09 RX ADMIN — METHYLPREDNISOLONE ACETATE 40 MG: 40 INJECTION, SUSPENSION INTRA-ARTICULAR; INTRALESIONAL; INTRAMUSCULAR; SOFT TISSUE at 13:09

## 2025-04-09 RX ADMIN — LIDOCAINE HYDROCHLORIDE 6 ML: 5 INJECTION, SOLUTION INFILTRATION at 13:09

## 2025-04-09 RX ADMIN — MIDAZOLAM 2 MG: 1 INJECTION INTRAMUSCULAR; INTRAVENOUS at 13:00

## 2025-04-09 ASSESSMENT — PAIN - FUNCTIONAL ASSESSMENT
PAIN_FUNCTIONAL_ASSESSMENT: WONG-BAKER FACES
PAIN_FUNCTIONAL_ASSESSMENT: 0-10
PAIN_FUNCTIONAL_ASSESSMENT: WONG-BAKER FACES
PAIN_FUNCTIONAL_ASSESSMENT: 0-10
PAIN_FUNCTIONAL_ASSESSMENT: 0-10

## 2025-04-09 ASSESSMENT — COLUMBIA-SUICIDE SEVERITY RATING SCALE - C-SSRS
1. IN THE PAST MONTH, HAVE YOU WISHED YOU WERE DEAD OR WISHED YOU COULD GO TO SLEEP AND NOT WAKE UP?: NO
2. HAVE YOU ACTUALLY HAD ANY THOUGHTS OF KILLING YOURSELF?: NO
6. HAVE YOU EVER DONE ANYTHING, STARTED TO DO ANYTHING, OR PREPARED TO DO ANYTHING TO END YOUR LIFE?: NO

## 2025-04-09 ASSESSMENT — PAIN SCALES - GENERAL
PAINLEVEL_OUTOF10: 5 - MODERATE PAIN
PAINLEVEL_OUTOF10: 2
PAINLEVEL_OUTOF10: 5 - MODERATE PAIN
PAINLEVEL_OUTOF10: 2
PAINLEVEL_OUTOF10: 5 - MODERATE PAIN

## 2025-04-09 NOTE — H&P
HISTORY AND PHYSICAL    History Of Present Illness  Lorraine Trivedi is a 64 y.o. female presenting with chronic pain here for procedure as stated below. Patient denies any changes to health since the last visit to our clinic.    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    Irritable bowel syndrome     Occipital neuralgia     Radiculopathy, lumbar region 07/22/2015    Chronic radicular lumbar pain    Seasickness 01/26/2023    Simple cyst of kidney 01/26/2023    Thoracic back pain 01/26/2023    Thrush, oral 01/26/2023       Surgical History  Past Surgical History:   Procedure Laterality Date    EYE SURGERY  04/20/2015    Eye Surgery Lasik    OTHER SURGICAL HISTORY  12/14/2020    Neck surgery - neck fusion    OTHER SURGICAL HISTORY Left 12/14/2020    Foot surgery x3    OTHER SURGICAL HISTORY  12/18/2018    Ankle surgery    TUBAL LIGATION  04/20/2015    Tubal Ligation        Social History  She reports that she quit smoking about 9 years ago. Her smoking use included cigarettes. She started smoking about 44 years ago. She has a 35 pack-year smoking history. She has quit using smokeless tobacco. She reports that she does not drink alcohol and does not use drugs.    Family History  Family History   Problem Relation Name Age of Onset    Diabetes Mother      Breast cancer Mother          mastectomy    Stroke Mother          x2    Cancer Father Pete     Alcohol abuse Father Pete     Stomach cancer Father Pete     Heart attack Brother  50    Heart attack Mother's Brother      Heart attack Maternal Grandfather      Other (pacemaker) Maternal Grandfather      Heart failure Other Grandmother     Heart failure Other  grandparent         Allergies  Adhesive tape-silicones    Review of Systems  12 point ROS done and negative except for the above.     Physical Exam  General: NAD, well groomed, well nourished  Eyes: Non-icteric sclera, EOMI  Ears, Nose, Mouth, and Throat: External ears and nose appear to be without deformity or rash. No lesions or masses noted. Hearing is grossly intact.   Neck: Trachea midline  Respiratory: Nonlabored breathing   Cardiovascular: No peripheral edema   Skin: No rashes or open lesions/ulcers identified on skin.      Last Recorded Vitals  There were no vitals taken for this visit.    Relevant Results  Current Outpatient Medications   Medication Instructions    albuterol (Ventolin HFA) 90 mcg/actuation inhaler 2 puffs, inhalation, Every 6 hours PRN    bismuth subsalicylate (PEPTO BISMOL) 524 mg, As needed    clonazePAM (KLONOPIN) 0.5 mg, oral, Nightly, To help sleep with pain and anxiety    esomeprazole (NEXIUM) 40 mg, oral, 2 times daily before meals, Do not open capsule.    fluticasone (Flonase) 50 mcg/actuation nasal spray 1 spray, Each Nostril, Daily, Shake gently. Before first use, prime pump. After use, clean tip and replace cap.    hyoscyamine (ANASPAZ, LEVSIN) 0.125 mg, oral, 4 times daily    ibuprofen 800 mg, oral, 3 times daily, As needed    meclizine (ANTIVERT) 12.5 mg, oral, Every 8 hours PRN    melatonin 1 mg, Nightly    methocarbamol (ROBAXIN) 750 mg, oral, 3 times daily PRN, PRN    pravastatin (PRAVACHOL) 10 mg, oral, Nightly    pregabalin (Lyrica) 75 mg capsule 1 capsule PO in the morning, 1 capsule in the afternoon, 2 capsules a bedtime    promethazine (PHENERGAN) 12.5 mg, oral, Every 8 hours PRN    rizatriptan MLT (MAXALT-MLT) 10 mg, oral, Once as needed, May repeat in 2 hours if unresolved. Do not exceed 30 mg in 24 hours.    topiramate (TOPAMAX) 100 mg, oral, Daily    valACYclovir (Valtrex) 1 gram tablet 1 tablet every 12 hours for one day. Indicated for cold sore outbreak.          MR cervical spine wo IV contrast 12/18/2024    Narrative  Interpreted By:  Kip Mesa,  STUDY:  MR CERVICAL SPINE WO IV CONTRAST;  12/18/2024 4:12 pm    INDICATION:  Signs/Symptoms:MRI CERVICAL,  ORDER IS SCANNED. ,M54.12  Radiculopathy, cervical region    COMPARISON:  May 2022.    ACCESSION NUMBER(S):  [id number]    ORDERING CLINICIAN:  FERNANDO HOUGH    TECHNIQUE:  The cervical spine was studied in the sagittal and axial planes  utilizing T1 and T2 weighted images.    FINDINGS:  The craniovertebral junction is normal. The cord is normal in size  and signal. The marrow signal is normal. Serial axial images reveal  the following: C2/C3  There is normal alignment and vertebral body height. The disc space  is normal. There is no evidence of canal or foraminal narrowing.  There is no evidence of bulging or herniated disc. C3/C4  Mild circumferential bulging intervertebral disc unchanged from the  previous exam. Mild bilateral facet hypertrophy. No measurable canal  or foraminal narrowing C4/C5  Previous anterior discectomy and interbody fusion. No residual canal  or foraminal narrowing C5/C6  Previous discectomy and anterior interbody fusion without residual  canal or foraminal narrowing C6/C7  Unchanged asymmetrical bulging intervertebral disc to the right with  slight flattening of the thecal sac but without measurable central  canal stenosis. No associated foraminal narrowing. C7/T1  There is normal alignment and vertebral body height. The disc space  is normal. There is no evidence of canal or foraminal narrowing.  There is no evidence of bulging or herniated disc.    Impression  * There is no measurable change compared to the previous exam.    MACRO:  none    Signed by: Kip Mesa 12/19/2024 11:54 AM  Dictation workstation:   [id number]     No image results found.     No diagnosis found.        Assessment/Plan   Lorraine Trivedi is a 64 y.o. female presenting with chronic pain here for Cervical  interlaminar epidural steroid injection; she denies any recent antibiotic use or infections, she denies any blood thinner use , and she denies contrast or local anesthetic allergies.    ASA PS Classification: 3  Mallampati score: 2    Plan:  - We will proceed with the procedure as above. We discussed extensively the risks, benefits, and alternatives to the procedure. The patient's questions were addressed and answered in detail. The patient demonstrated understanding of the procedure, and is amenable to proceeding with it. The Risks of the procedure that were discussed with the patient include but are not limited to the following: A lack of efficacy, transient worsening of pain, bleeding, infection, nerve injury, nerve damage, neuritis or sunburn sensation. Informed consent obtained as attached to EMR.  - Patient to continue physician directed home exercises as tolerated.  - Patient will follow-up with us in clinic.      Bradley Lennon MD  Chronic Pain Fellow  [facility]

## 2025-04-09 NOTE — DISCHARGE INSTRUCTIONS
DISCHARGE INSTRUCTIONS FOR INJECTIONS     You underwent Cervical interlaminar epidural steroid injection today    After most injections, it is recommended that you relax and limit your activity for the remainder of the day unless you have been told otherwise by your pain physician.  You should not drive a car, operate machinery, or make important legal decisions unless otherwise directed by your pain physician.  You may resume your normal activity, including exercise, tomorrow.      Keep a written pain diary of how much pain relief you experienced following the injection procedure and the length of time of pain relief you experienced pain relief. Following diagnostic injections like medial branch nerve blocks, sacroiliac joint blocks, stellate ganglion injections and other blocks, it is very important you record the specific amount of pain relief you experienced immediately after the injectionand how long it lasted. Your doctor will ask you for this information at your follow up visit.     For all injections, please keep the injection site dry and inspect the site for a couple of days. You may remove the Band-Aid the day of the injection at any time.     Some discomfort, bruising or slight swelling may occur at the injection site. This is not abnormal if it occurs.  If needed you may:    -Take over the counter medication such as Tylenol or Motrin.   -Apply an ice pack for 30 minutes, 2 to 3 times a day for the first 24 hours.     You may shower today; no soaking baths, hot tubs, whirlpools or swimming pools for two days.      If you are given steroids in your injection, it may take 3-5 days for the steroid medication to take effect. You may notice a worsening of your symptoms for 1-2 days after the injection. This is not abnormal.  You may use acetaminophen, ibuprofen, or prescription medication that your doctor may have prescribed for you if you need to do so.     A few common side effects of steroids include  facial flushing, sweating, restlessness, irritability,difficulty sleeping, increase in blood sugar, and increased blood pressure. If you have diabetes, please monitor your blood sugar at least once a day for at least 5 days. If you have poorly controlled high blood pressure, monitoryour blood pressure for at least 2 days and contact your primary care physician if these numbers are unusually high for you.      If you take aspirin or non-steroidal anti-inflammatory drugs (examples are Motrin, Advil, ibuprofen, Naprosyn, Voltaren, Relafen, etc.) you may restart these this evening, but stop taking it 3 days before your next appointment, unless instructed otherwiseby your physician.      You do not need to discontinue non-aspirin-containing pain medications prior to an injection (examples: Celebrex, tramadol, hydrocodone and acetaminophen).      If you take a blood thinning medication (Coumadin, Lovenox, Fragmin,Ticlid, Plavix, Pradaxa, etc.), please discuss this with your primary care physician/cardiologist and your pain physician. These medications MUST be discontinued before you can have an injection safely, without the risk of uncontrolled bleeding. If these medications are not discontinued for an appropriate period of time, you will not be able to receivean injection. Please adhere to instructions given to you about when to restart your blood thinning medication. If you have any questions please reach out to our team.    If you are taking Coumadin, please have your INR checked the morning of your procedure and bring the result to your appointment unless otherwise instructed. If your INR is over 1.2, your injection may need to be rescheduled to avoid uncontrolled bleeding from the needle placement.     Call [facility]  and ask for Pain Management at [phone] between 8am-4pm Monday - Friday if you are experiencing the following:    If you received an epidural or spinal injection:    -Headache that doesnot go away  with medicine, is worse when sitting or standing up, and is greatly relieved upon lying down.   -Severe pain worse than or different than your baseline pain.   -Chills or fever (101º F or greater).   -Drainage or signs of infection at the injection site     Go directly to the Emergency Department if you are experiencing the following and received an epidural or spinal injection:   -Abrupt weakness or progressive weakness in your legs that starts after you leave the clinic.   -Abrupt severe or worsening numbness in your legs.   -Inability to urinate after the injection or loss of bowel or bladder control without the urge to defecate or urinate.     If you have a clinical question that cannot wait until your next appointment, please call [phone] between 8am-4pm Monday - Friday or send a [employer] message. We do our best to return all non-emergency messages within 24 hours, Monday - Friday. A nurse or physician will return your message. You may also the appropriate nurse below, and they will do their best to answer your questions.  - For Dr. Schaffer, call Jenifer at [phone]  - For Dr. Nicholson, call Michelle at [phone]  - For Dr. Melendez, call Michelle at [phone]  - For Dr. Aviles, call Summer at [phone]  - For Dr. Felder, call Summer at [phone]    If you need to cancel an appointment, please call the scheduling staff at [phone] during normal business hours or leave a message at least 24 hours in advance.     If you are going to be sedated for your next procedure, you MUST have responsible adult who can legally drive accompany you home. You cannot eat or drink for at least eight hours prior to the planned procedure if you are going to receive sedation. You may take your non-blood thinning medications with a small sip of water.

## 2025-04-16 ENCOUNTER — APPOINTMENT (OUTPATIENT)
Dept: PRIMARY CARE | Facility: CLINIC | Age: 65
End: 2025-04-16
Payer: COMMERCIAL

## 2025-04-16 VITALS
WEIGHT: 218.2 LBS | DIASTOLIC BLOOD PRESSURE: 70 MMHG | OXYGEN SATURATION: 99 % | BODY MASS INDEX: 30.55 KG/M2 | SYSTOLIC BLOOD PRESSURE: 112 MMHG | HEART RATE: 69 BPM | HEIGHT: 71 IN | TEMPERATURE: 97.7 F

## 2025-04-16 DIAGNOSIS — M50.10 CERVICAL DISC DISORDER WITH RADICULOPATHY: ICD-10-CM

## 2025-04-16 DIAGNOSIS — M54.81 BILATERAL OCCIPITAL NEURALGIA: ICD-10-CM

## 2025-04-16 PROCEDURE — 99213 OFFICE O/P EST LOW 20 MIN: CPT | Performed by: PHYSICIAN ASSISTANT

## 2025-04-16 PROCEDURE — 1036F TOBACCO NON-USER: CPT | Performed by: PHYSICIAN ASSISTANT

## 2025-04-16 PROCEDURE — 3008F BODY MASS INDEX DOCD: CPT | Performed by: PHYSICIAN ASSISTANT

## 2025-04-16 RX ORDER — PREGABALIN 75 MG/1
CAPSULE ORAL
Qty: 112 CAPSULE | Refills: 2 | Status: SHIPPED | OUTPATIENT
Start: 2025-04-16 | End: 2025-07-15

## 2025-04-16 RX ORDER — IBUPROFEN 800 MG/1
800 TABLET ORAL 3 TIMES DAILY
Qty: 90 TABLET | Refills: 3 | Status: SHIPPED | OUTPATIENT
Start: 2025-04-16

## 2025-04-16 RX ORDER — CLONAZEPAM 0.5 MG/1
0.5 TABLET ORAL NIGHTLY
Qty: 30 TABLET | Refills: 2 | Status: SHIPPED | OUTPATIENT
Start: 2025-04-16 | End: 2025-07-15

## 2025-04-16 ASSESSMENT — ANXIETY QUESTIONNAIRES
GAD7 TOTAL SCORE: 0
4. TROUBLE RELAXING: NOT AT ALL
1. FEELING NERVOUS, ANXIOUS, OR ON EDGE: NOT AT ALL
2. NOT BEING ABLE TO STOP OR CONTROL WORRYING: NOT AT ALL
5. BEING SO RESTLESS THAT IT IS HARD TO SIT STILL: NOT AT ALL
3. WORRYING TOO MUCH ABOUT DIFFERENT THINGS: NOT AT ALL
6. BECOMING EASILY ANNOYED OR IRRITABLE: NOT AT ALL
7. FEELING AFRAID AS IF SOMETHING AWFUL MIGHT HAPPEN: NOT AT ALL

## 2025-04-16 NOTE — PROGRESS NOTES
Subjective     HPI   Lorraine Trivedi is a 64 y.o. year old female patient with presenting to clinic with concern for   Chief Complaint   Patient presents with    Follow-up       Recheck CBC- ordered due 4/26    Needs new CSA lyrica and clonazepam  UDS due next month, anuric today      BP Readings from Last 5 Encounters:   04/16/25 112/70   04/09/25 135/64   01/14/25 108/60   12/23/24 133/76   12/18/24 128/78          HLD  -pravastatin     GERD  -esomeprazole 40     Migraines  -topamax 100 at bedtime  -maxalt MDT prn  -zofran   -phenergan 12.5     Anxiety  -clonazepam 0.5mg  Qty 30 tab per 30 days, OARRS review, CSA, UDS     Cervical disc disorder w radiculopathy  Dr Blanca Schaffer pain mgt  - steroid injections  I write lyrica  -Lyrica 4 cap/day qty 112 cap per 30 days, OARRS review, CSA, UDS reviewed     Occipital neuralgia & Trigeminal neuralgia & migraines  -botox injections  Dr Eason Neuro  Occipital neuralgia    -pain injections  Dr Sotomayor Spinal Surgeon        Allergic rhinitis  -flonase     Recurrent cold sores  -valtrex 1g 2 tab bid PRN outbreak     IBS  Iqra Sosa  -Xifaxan  -anaspaz (not extended release)     Vertigo  -meclizine 12.5  -zofran      Polyarthralgia and stiffness  Tested negative for autoimmune diseases 1/2024     Left Knee-   arthroscopic debridement summer 2024 for miniscus   [facility]     Plantar fasciitis.   Continues physical therapy     Cardiology   Dr Trujillo 2022  Cardiology testing for preop     Specialists  Dr Eason Neuro- migraines- botox injections Trigeminal neuralgia  Micki Sosa GI- IBS (Anaspaz, not XR) and Xifaxan  Dr Sotomayor Spinal Surgeon- occipital neuralgia injections  Tsering Horton- ENT- esophageal dysphagia  Cardiology 2022 Dr Trujillo-Sinus rhythm with 1st degree A-V block  Dr Acosta Pain mgt injections     \OARRS:  Natasha Dunbar PA-C on 4/16/2025  3:46 PM  I have personally reviewed the OARRS report for Lorraine Trivedi. I have considered the risks  of abuse, dependence, addiction and diversion    Is the patient prescribed a combination of a benzodiazepine and opioid?  No    Last Urine Drug Screen / ordered today: Yes  Recent Results (from the past 8760 hours)   Confirmation Opiate/Opioid/Benzo Prescription Compliance    Collection Time: 05/24/24 10:18 AM   Result Value Ref Range    Clonazepam <25 <25 ng/mL    7-Aminoclonazepam 70 (H) <25 ng/mL    Alprazolam <25 <25 ng/mL    Alpha-Hydroxyalprazolam <25 <25 ng/mL    Midazolam <25 <25 ng/mL    Alpha-Hydroxymidazolam <25 <25 ng/mL    Chlordiazepoxide <25 <25 ng/mL    Diazepam <25 <25 ng/mL    Nordiazepam <25 <25 ng/mL    Temazepam <25 <25 ng/mL    Oxazepam <25 <25 ng/mL    Lorazepam <25 <25 ng/mL    Methadone <25 <25 ng/mL    EDDP <25 <25 ng/mL    6-Acetylmorphine <25 <25 ng/mL    Codeine <50 <50 ng/mL    Hydrocodone <25 <25 ng/mL    Hydromorphone <25 <25 ng/mL    Morphine  <50 <50 ng/mL    Norhydrocodone <25 <25 ng/mL    Noroxycodone <25 <25 ng/mL    Oxycodone <25 <25 ng/mL    Oxymorphone <25 <25 ng/mL    Fentanyl <2.5 <2.5 ng/mL    Norfentanyl <2.5 <2.5 ng/mL    Tramadol <50 <50 ng/mL    O-Desmethyltramadol <50 <50 ng/mL    Zolpidem <25 <25 ng/mL    Zolpidem Metabolite (ZCA) <25 <25 ng/mL   Screen Opiate/Opioid/Benzo Prescription Compliance    Collection Time: 05/24/24 10:18 AM   Result Value Ref Range    Creatinine, Urine Random 16.3 (L) 20.0 - 320.0 mg/dL    Amphetamine Screen, Urine Presumptive Negative Presumptive Negative    Barbiturate Screen, Urine Presumptive Negative Presumptive Negative    Cannabinoid Screen, Urine Presumptive Negative Presumptive Negative    Cocaine Metabolite Screen, Urine Presumptive Negative Presumptive Negative    PCP Screen, Urine Presumptive Negative Presumptive Negative     Results are as expected.         Controlled Substance Agreement:  Date of the Last Agreement:4/16/25  /Reviewed Controlled Substance Agreement including but not limited to the benefits, risks, and  alternatives to treatment with a Controlled Substance medication(s).    Benzodiazepines:  What is the patient's goal of therapy? improvement of debilitating effects of anxiety  Is this being achieved with current treatment? yes    RIO-7:  No data recorded    Activities of Daily Living:   Is your overall impression that this patient is benefiting (symptom reduction outweighs side effects) from benzodiazepine therapy? Yes     1. Physical Functioning: same  2. Family Relationship: Better  3. Social Relationship: Better  4. Mood: Same  5. Sleep Patterns: Better  6. Overall Function: Better     and   Lyrica:  What is the patient's goal of therapy? pain relief, improved mobility and quality of life  Is this being achieved with current treatment? yes    Pain Assessment:  No data recorded    Activities of Daily Living:  Is your overall impression that this patient is benefiting (symptom reduction outweighs side effects) from Lyrica therapy? Yes     1. Physical Functioning: Better  2. Family Relationship: Better  3. Social Relationship: Better  4. Mood: Better  5. Sleep Patterns: Same  6. Overall Function: Better      Problem List[1]    Medical History[2]   Surgical History[3]   Family History[4]   Social History     Tobacco Use    Smoking status: Former     Current packs/day: 0.00     Average packs/day: 1 pack/day for 35.0 years (35.0 ttl pk-yrs)     Types: Cigarettes     Start date: 7/15/1980     Quit date: 7/15/2015     Years since quittin.7    Smokeless tobacco: Former   Substance Use Topics    Alcohol use: Never      Current Medications[5]     Review of Systems  Constitutional: Denies fever  HEENT: Denies ST, earache  CVS: Denies Chest pain  Pulmonary: Denies wheezing, SOB  GI: Denies N/V  : Denies dysuria  Musculoskeletal:  Denies myalgia  Neuro: Denies focal weakness or numbness.  Skin: Denies Rashes.  *Review of Systems is negative unless otherwise mentioned in HPI or ROS above.    Objective   /70    "Pulse 69   Temp 36.5 °C (97.7 °F)   Ht 1.803 m (5' 11\")   Wt 99 kg (218 lb 3.2 oz)   SpO2 99%   BMI 30.43 kg/m²  reviewed Body mass index is 30.43 kg/m².     Physical Exam  Constitutional: NAD.  Resting comfortably.  Head: Atraumatic, normocephalic.  ENT: Moist oral mucosa. Nasal mucosa wnl.   Cardiac: Regular rate & rhythm.   Pulmonary: Lungs clear bilat  GI: Soft, Nontender, nondistended.   Musculoskeletal: No peripheral edema.   Skin: No evidence of trauma. No rashes  Psych: Intact judgement and insight.    .Assessment/Plan   Problem List Items Addressed This Visit           ICD-10-CM    Occipital neuralgia M54.81    Relevant Medications    pregabalin (Lyrica) 75 mg capsule    clonazePAM (KlonoPIN) 0.5 mg tablet     Other Visit Diagnoses         Codes      Cervical disc disorder with radiculopathy     M50.10    Relevant Medications    pregabalin (Lyrica) 75 mg capsule    ibuprofen 800 mg tablet          Problem List Items Addressed This Visit       Occipital neuralgia    Relevant Medications    pregabalin (Lyrica) 75 mg capsule    clonazePAM (KlonoPIN) 0.5 mg tablet     Other Visit Diagnoses         Cervical disc disorder with radiculopathy        Relevant Medications    pregabalin (Lyrica) 75 mg capsule    ibuprofen 800 mg tablet                 [1]   Patient Active Problem List  Diagnosis    Occipital neuralgia    Cervical myofascial pain syndrome    Chronic lumbar radiculopathy    Dyslipidemia    GERD (gastroesophageal reflux disease)    History of cold sores    Irritable bowel syndrome with diarrhea    Peroneal tendon tear    Pes cavus, congenital    Vertigo    Nausea in adult    Otalgia of both ears    Migraine without aura and without status migrainosus, not intractable    Lumbar stenosis with neurogenic claudication    Facet arthropathy, cervical    Cervical radiculopathy    Postlaminectomy syndrome, cervical region    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    Oral thrush    Hyperglycemia "   [2]   Past Medical History:  Diagnosis Date    Abnormal EKG 01/26/2023    Alkaline phosphatase elevation 01/26/2023    Ankle impingement syndrome 01/26/2023    Ankle pain 01/26/2023    Back pain, thoracic 01/26/2023    DDD (degenerative disc disease), cervical 01/26/2023    GERD (gastroesophageal reflux disease)     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    Irritable bowel syndrome     Occipital neuralgia     Radiculopathy, lumbar region 07/22/2015    Chronic radicular lumbar pain    Seasickness 01/26/2023    Simple cyst of kidney 01/26/2023    Thoracic back pain 01/26/2023    Thrush, oral 01/26/2023   [3]   Past Surgical History:  Procedure Laterality Date    EYE SURGERY  04/20/2015    Eye Surgery Lasik    OTHER SURGICAL HISTORY  12/14/2020    Neck surgery - neck fusion    OTHER SURGICAL HISTORY Left 12/14/2020    Foot surgery x3    OTHER SURGICAL HISTORY  12/18/2018    Ankle surgery    TUBAL LIGATION  04/20/2015    Tubal Ligation   [4]   Family History  Problem Relation Name Age of Onset    Diabetes Mother      Breast cancer Mother          mastectomy    Stroke Mother          x2    Cancer Father Pete     Alcohol abuse Father Pete     Stomach cancer Father Pete     Heart attack Brother  50    Heart attack Mother's Brother      Heart attack Maternal Grandfather      Other (pacemaker) Maternal Grandfather      Heart failure Other Grandmother     Heart failure Other grandparent    [5]   Current Outpatient Medications:     bismuth subsalicylate (Pepto Bismol) 262 mg chewable tablet, Chew 2 tablets (524 mg) if needed for indigestion., Disp: , Rfl:     esomeprazole (NexIUM) 40 mg DR capsule, Take 1 capsule (40 mg) by mouth 2 times a day before meals. Do not open capsule., Disp: 60 capsule, Rfl: 11    fluticasone (Flonase) 50 mcg/actuation nasal spray, Administer 1 spray into each nostril once daily. Shake gently. Before first use, prime pump. After use, clean tip and replace cap., Disp: 16 g, Rfl: 11    hyoscyamine (Anaspaz, Levsin)  0.125 mg tablet, Take 1 tablet (0.125 mg) by mouth 4 times a day., Disp: 120 tablet, Rfl: 0    meclizine (Antivert) 12.5 mg tablet, Take 1 tablet (12.5 mg) by mouth every 8 hours if needed (For Vertigo). (Patient taking differently: Take 1 tablet (12.5 mg) by mouth 3 times a day as needed (For Vertigo).), Disp: 180 tablet, Rfl: 3    melatonin 1 mg tablet, Take 1 tablet (1 mg) by mouth once daily at bedtime., Disp: , Rfl:     methocarbamol (Robaxin) 750 mg tablet, Take 1 tablet (750 mg) by mouth 3 times a day as needed for muscle spasms. PRN, Disp: 90 tablet, Rfl: 11    pravastatin (Pravachol) 10 mg tablet, Take 1 tablet (10 mg) by mouth once daily at bedtime., Disp: 30 tablet, Rfl: 11    promethazine (Phenergan) 12.5 mg tablet, Take 1 tablet (12.5 mg) by mouth every 8 hours if needed for nausea or vomiting., Disp: 90 tablet, Rfl: 3    rizatriptan MLT (Maxalt-MLT) 10 mg disintegrating tablet, Take 1 tablet (10 mg) by mouth 1 time if needed for migraine. May repeat in 2 hours if unresolved. Do not exceed 30 mg in 24 hours., Disp: 30 tablet, Rfl: 11    topiramate (Topamax) 100 mg tablet, Take 1 tablet (100 mg) by mouth once daily., Disp: 30 tablet, Rfl: 11    valACYclovir (Valtrex) 1 gram tablet, 1 tablet every 12 hours for one day. Indicated for cold sore outbreak., Disp: 2 tablet, Rfl: 5    albuterol (Ventolin HFA) 90 mcg/actuation inhaler, Inhale 2 puffs every 6 hours if needed for wheezing or shortness of breath for up to 8 days. (Patient not taking: Reported on 4/16/2025), Disp: 8 g, Rfl: 0    clonazePAM (KlonoPIN) 0.5 mg tablet, Take 1 tablet (0.5 mg) by mouth once daily at bedtime. To help sleep with pain and anxiety, Disp: 30 tablet, Rfl: 2    ibuprofen 800 mg tablet, Take 1 tablet (800 mg) by mouth 3 times a day. As needed, Disp: 90 tablet, Rfl: 3    pregabalin (Lyrica) 75 mg capsule, 1 capsule PO in the morning, 1 capsule in the afternoon, 2 capsules a bedtime, Disp: 112 capsule, Rfl: 2

## 2025-04-18 DIAGNOSIS — R11.0 NAUSEA IN ADULT: ICD-10-CM

## 2025-04-18 DIAGNOSIS — K58.0 IRRITABLE BOWEL SYNDROME WITH DIARRHEA: ICD-10-CM

## 2025-04-18 RX ORDER — HYOSCYAMINE SULFATE 0.125 MG
0.12 TABLET ORAL 4 TIMES DAILY
Qty: 360 TABLET | Refills: 3 | Status: SHIPPED | OUTPATIENT
Start: 2025-04-18 | End: 2026-04-18

## 2025-05-02 ENCOUNTER — APPOINTMENT (OUTPATIENT)
Facility: HOSPITAL | Age: 65
End: 2025-05-02
Payer: COMMERCIAL

## 2025-05-20 ENCOUNTER — OFFICE VISIT (OUTPATIENT)
Dept: PRIMARY CARE | Facility: CLINIC | Age: 65
End: 2025-05-20
Payer: COMMERCIAL

## 2025-05-20 VITALS
DIASTOLIC BLOOD PRESSURE: 74 MMHG | HEART RATE: 76 BPM | SYSTOLIC BLOOD PRESSURE: 116 MMHG | TEMPERATURE: 98.1 F | OXYGEN SATURATION: 98 % | BODY MASS INDEX: 30.85 KG/M2 | WEIGHT: 221.2 LBS

## 2025-05-20 DIAGNOSIS — M54.16 LUMBAR RADICULOPATHY, CHRONIC: Primary | ICD-10-CM

## 2025-05-20 PROCEDURE — 96372 THER/PROPH/DIAG INJ SC/IM: CPT | Performed by: PHYSICIAN ASSISTANT

## 2025-05-20 PROCEDURE — 99213 OFFICE O/P EST LOW 20 MIN: CPT | Performed by: PHYSICIAN ASSISTANT

## 2025-05-20 PROCEDURE — 1036F TOBACCO NON-USER: CPT | Performed by: PHYSICIAN ASSISTANT

## 2025-05-20 RX ORDER — PREDNISONE 20 MG/1
TABLET ORAL
Qty: 15 TABLET | Refills: 0 | Status: SHIPPED | OUTPATIENT
Start: 2025-05-20

## 2025-05-20 RX ORDER — METHYLPREDNISOLONE SODIUM SUCCINATE 125 MG/2ML
125 INJECTION INTRAMUSCULAR; INTRAVENOUS ONCE
Status: COMPLETED | OUTPATIENT
Start: 2025-05-20 | End: 2025-05-20

## 2025-05-20 RX ADMIN — METHYLPREDNISOLONE SODIUM SUCCINATE 125 MG: 125 INJECTION INTRAMUSCULAR; INTRAVENOUS at 11:47

## 2025-05-20 NOTE — PROGRESS NOTES
Subjective     HPI   Lorraine Trivedi is a 64 y.o. year old female patient with presenting to clinic with concern for   Chief Complaint   Patient presents with    Numbness     Left thigh-  hip to knee  On and off throughout the day x3 weeks  Numb/ painful  HX of back pain       Left lateral thigh numbness  Hurts to lay on. Numbness, but tender to touch? Has upcoming low back injections. Has not had radicular symptoms like this before.          Problem List[1]    Medical History[2]   Surgical History[3]   Family History[4]   Social History     Tobacco Use    Smoking status: Former     Current packs/day: 0.00     Average packs/day: 1 pack/day for 35.0 years (35.0 ttl pk-yrs)     Types: Cigarettes     Start date: 7/15/1980     Quit date: 7/15/2015     Years since quittin.8    Smokeless tobacco: Former   Substance Use Topics    Alcohol use: Never      Current Medications[5]     Review of Systems  Constitutional: Denies fever  HEENT: Denies ST, earache  CVS: Denies Chest pain  Pulmonary: Denies wheezing, SOB  GI: Denies N/V  : Denies dysuria  Musculoskeletal:  Denies myalgia  Neuro: Denies focal weakness or numbness.  Skin: Denies Rashes.  *Review of Systems is negative unless otherwise mentioned in HPI or ROS above.    Objective   /74   Pulse 76   Temp 36.7 °C (98.1 °F)   Wt 100 kg (221 lb 3.2 oz)   SpO2 98%   BMI 30.85 kg/m²  reviewed Body mass index is 30.85 kg/m².     Physical Exam  Constitutional: NAD.  Resting comfortably.  Head: Atraumatic, normocephalic.  ENT: Moist oral mucosa. Nasal mucosa wnl.   Cardiac: Regular rate & rhythm.   Pulmonary: Lungs clear bilat  GI: Soft, Nontender, nondistended.   Musculoskeletal: No peripheral edema.   Skin: No evidence of trauma. No rashes  Psych: Intact judgement and insight.    .Assessment/Plan     Problem List Items Addressed This Visit    None  Visit Diagnoses         Lumbar radiculopathy, chronic    -  Primary    Relevant Medications    predniSONE (Deltasone)  20 mg tablet                 [1]   Patient Active Problem List  Diagnosis    Occipital neuralgia    Cervical myofascial pain syndrome    Chronic lumbar radiculopathy    Dyslipidemia    GERD (gastroesophageal reflux disease)    History of cold sores    Irritable bowel syndrome with diarrhea    Peroneal tendon tear    Pes cavus, congenital    Vertigo    Nausea in adult    Otalgia of both ears    Migraine without aura and without status migrainosus, not intractable    Lumbar stenosis with neurogenic claudication    Facet arthropathy, cervical    Cervical radiculopathy    Postlaminectomy syndrome, cervical region    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    Oral thrush    Hyperglycemia   [2]   Past Medical History:  Diagnosis Date    Abnormal EKG 01/26/2023    Alkaline phosphatase elevation 01/26/2023    Ankle impingement syndrome 01/26/2023    Ankle pain 01/26/2023    Back pain, thoracic 01/26/2023    DDD (degenerative disc disease), cervical 01/26/2023    GERD (gastroesophageal reflux disease)     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    Irritable bowel syndrome     Occipital neuralgia     Radiculopathy, lumbar region 07/22/2015    Chronic radicular lumbar pain    Seasickness 01/26/2023    Simple cyst of kidney 01/26/2023    Thoracic back pain 01/26/2023    Thrush, oral 01/26/2023   [3]   Past Surgical History:  Procedure Laterality Date    EYE SURGERY  04/20/2015    Eye Surgery Lasik    OTHER SURGICAL HISTORY  12/14/2020    Neck surgery - neck fusion    OTHER SURGICAL HISTORY Left 12/14/2020    Foot surgery x3    OTHER SURGICAL HISTORY  12/18/2018    Ankle surgery    TUBAL LIGATION  04/20/2015    Tubal Ligation   [4]   Family History  Problem Relation Name Age of Onset    Diabetes Mother      Breast cancer Mother          mastectomy    Stroke Mother          x2    Cancer Father Pete     Alcohol abuse Father Pete     Stomach cancer Father Pete     Heart attack Brother  50    Heart attack Mother's Brother      Heart  attack Maternal Grandfather      Other (pacemaker) Maternal Grandfather      Heart failure Other Grandmother     Heart failure Other grandparent    [5]   Current Outpatient Medications:     albuterol (Ventolin HFA) 90 mcg/actuation inhaler, Inhale 2 puffs every 6 hours if needed for wheezing or shortness of breath for up to 8 days. (Patient not taking: Reported on 4/16/2025), Disp: 8 g, Rfl: 0    bismuth subsalicylate (Pepto Bismol) 262 mg chewable tablet, Chew 2 tablets (524 mg) if needed for indigestion., Disp: , Rfl:     clonazePAM (KlonoPIN) 0.5 mg tablet, Take 1 tablet (0.5 mg) by mouth once daily at bedtime. To help sleep with pain and anxiety, Disp: 30 tablet, Rfl: 2    esomeprazole (NexIUM) 40 mg DR capsule, Take 1 capsule (40 mg) by mouth 2 times a day before meals. Do not open capsule., Disp: 60 capsule, Rfl: 11    fluticasone (Flonase) 50 mcg/actuation nasal spray, Administer 1 spray into each nostril once daily. Shake gently. Before first use, prime pump. After use, clean tip and replace cap., Disp: 16 g, Rfl: 11    hyoscyamine (Anaspaz, Levsin) 0.125 mg tablet, Take 1 tablet (0.125 mg) by mouth 4 times a day., Disp: 360 tablet, Rfl: 3    ibuprofen 800 mg tablet, Take 1 tablet (800 mg) by mouth 3 times a day. As needed, Disp: 90 tablet, Rfl: 3    meclizine (Antivert) 12.5 mg tablet, Take 1 tablet (12.5 mg) by mouth every 8 hours if needed (For Vertigo). (Patient taking differently: Take 1 tablet (12.5 mg) by mouth 3 times a day as needed (For Vertigo).), Disp: 180 tablet, Rfl: 3    melatonin 1 mg tablet, Take 1 tablet (1 mg) by mouth once daily at bedtime., Disp: , Rfl:     methocarbamol (Robaxin) 750 mg tablet, Take 1 tablet (750 mg) by mouth 3 times a day as needed for muscle spasms. PRN, Disp: 90 tablet, Rfl: 11    pravastatin (Pravachol) 10 mg tablet, Take 1 tablet (10 mg) by mouth once daily at bedtime., Disp: 30 tablet, Rfl: 11    predniSONE (Deltasone) 20 mg tablet, Take 3 tabs (60mg) daily for 3  days, then take 2 tabs (40mg) daily for 2 days, then take 1 tab (20mg) daily for 2 days., Disp: 15 tablet, Rfl: 0    pregabalin (Lyrica) 75 mg capsule, 1 capsule PO in the morning, 1 capsule in the afternoon, 2 capsules a bedtime, Disp: 112 capsule, Rfl: 2    promethazine (Phenergan) 12.5 mg tablet, Take 1 tablet (12.5 mg) by mouth every 8 hours if needed for nausea or vomiting., Disp: 90 tablet, Rfl: 3    rizatriptan MLT (Maxalt-MLT) 10 mg disintegrating tablet, Take 1 tablet (10 mg) by mouth 1 time if needed for migraine. May repeat in 2 hours if unresolved. Do not exceed 30 mg in 24 hours., Disp: 30 tablet, Rfl: 11    topiramate (Topamax) 100 mg tablet, Take 1 tablet (100 mg) by mouth once daily., Disp: 30 tablet, Rfl: 11    valACYclovir (Valtrex) 1 gram tablet, 1 tablet every 12 hours for one day. Indicated for cold sore outbreak., Disp: 2 tablet, Rfl: 5

## 2025-05-30 DIAGNOSIS — M54.16 CHRONIC LUMBAR RADICULOPATHY: ICD-10-CM

## 2025-06-06 ENCOUNTER — APPOINTMENT (OUTPATIENT)
Facility: HOSPITAL | Age: 65
End: 2025-06-06
Payer: COMMERCIAL

## 2025-06-06 DIAGNOSIS — R42 VERTIGO: ICD-10-CM

## 2025-06-06 RX ORDER — MECLIZINE HCL 12.5 MG 12.5 MG/1
12.5 TABLET ORAL EVERY 8 HOURS PRN
Qty: 180 TABLET | Refills: 3 | Status: SHIPPED | OUTPATIENT
Start: 2025-06-06

## 2025-06-09 ENCOUNTER — HOSPITAL ENCOUNTER (OUTPATIENT)
Dept: RADIOLOGY | Facility: HOSPITAL | Age: 65
Discharge: HOME | End: 2025-06-09
Payer: COMMERCIAL

## 2025-06-09 DIAGNOSIS — M54.16 CHRONIC LUMBAR RADICULOPATHY: ICD-10-CM

## 2025-06-09 PROCEDURE — 72148 MRI LUMBAR SPINE W/O DYE: CPT | Performed by: RADIOLOGY

## 2025-06-09 PROCEDURE — 72148 MRI LUMBAR SPINE W/O DYE: CPT

## 2025-06-12 NOTE — H&P
Pain Management H&P    History Of Present Illness  Lorraine Trivedi is a 65 y.o. female presents for procedure state below. Endorses no changes in past medical history or medical health since last seen in clinic.      Past Medical History  She has a past medical history of Abnormal EKG (01/26/2023), Alkaline phosphatase elevation (01/26/2023), Ankle impingement syndrome (01/26/2023), Ankle pain (01/26/2023), Back pain, thoracic (01/26/2023), DDD (degenerative disc disease), cervical (01/26/2023), GERD (gastroesophageal reflux disease), Glossodynia (01/26/2023), Irritable bowel syndrome, Occipital neuralgia, Radiculopathy, lumbar region (07/22/2015), Seasickness (01/26/2023), Simple cyst of kidney (01/26/2023), Thoracic back pain (01/26/2023), and Thrush, oral (01/26/2023).    Surgical History  She has a past surgical history that includes Eye surgery (04/20/2015); Tubal ligation (04/20/2015); Other surgical history (12/14/2020); Other surgical history (Left, 12/14/2020); and Other surgical history (12/18/2018).     Social History  She reports that she quit smoking about 9 years ago. Her smoking use included cigarettes. She started smoking about 44 years ago. She has a 35 pack-year smoking history. She has quit using smokeless tobacco. She reports that she does not drink alcohol and does not use drugs.    Family History  Family History[1]     Allergies  Adhesive tape-silicones    Review of Symptoms:   Constitutional: Negative for chills, diaphoresis or fever  HENT: Negative for neck swelling  Eyes:.  Negative for eye pain  Respiratory:.  Negative for cough, shortness of breath or wheezing    Cardiovascular:.  Negative for chest pain or palpitations  Gastrointestinal:.  Negative for abdominal pain, nausea and vomiting  Genitourinary:.  Negative for urgency  Musculoskeletal:  Positive for back pain. Positive for joint pain. Denies falls within the past 3 months.  Skin: Negative for wounds or itching   Neurological:  Negative for dizziness, seizures, loss of consciousness and weakness  Endo/Heme/Allergies: Does not bruise/bleed easily  Psychiatric/Behavioral: Negative for depression. The patient does not appear anxious.      Pre-sedation Evaluation  ASA class 2  Mallampati score 2     PHYSICAL EXAM  Vitals signs reviewed  Constitutional:       General: Not in acute distress     Appearance: Normal appearance. Not ill-appearing.  HENT:     Head: Normocephalic and atraumatic  Eyes:     Conjunctiva/sclera: Conjunctivae normal  Cardiovascular:     Rate and Rhythm: Normal rate and regular rhythm  Pulmonary:     Effort: No respiratory distress  Abdominal:     Palpations: Abdomen is soft  Musculoskeletal: RICH  Skin:     General: Skin is warm and dry  Neurological:     General: No focal deficit present  Psychiatric:         Mood and Affect: Mood normal         Behavior: Behavior normal     Last Recorded Vitals  There were no vitals taken for this visit.    Relevant Results  Current Outpatient Medications   Medication Instructions    albuterol (Ventolin HFA) 90 mcg/actuation inhaler 2 puffs, inhalation, Every 6 hours PRN    bismuth subsalicylate (PEPTO BISMOL) 524 mg, As needed    clonazePAM (KLONOPIN) 0.5 mg, oral, Nightly, To help sleep with pain and anxiety    esomeprazole (NEXIUM) 40 mg, oral, 2 times daily before meals, Do not open capsule.    fluticasone (Flonase) 50 mcg/actuation nasal spray 1 spray, Each Nostril, Daily, Shake gently. Before first use, prime pump. After use, clean tip and replace cap.    hyoscyamine (ANASPAZ, LEVSIN) 0.125 mg, oral, 4 times daily    ibuprofen 800 mg, oral, 3 times daily, As needed    meclizine (ANTIVERT) 12.5 mg, oral, Every 8 hours PRN    melatonin 1 mg, Nightly    methocarbamol (ROBAXIN) 750 mg, oral, 3 times daily PRN, PRN    pravastatin (PRAVACHOL) 10 mg, oral, Nightly    predniSONE (Deltasone) 20 mg tablet Take 3 tabs (60mg) daily for 3 days, then take 2 tabs (40mg) daily for 2 days, then take  1 tab (20mg) daily for 2 days.    pregabalin (Lyrica) 75 mg capsule 1 capsule PO in the morning, 1 capsule in the afternoon, 2 capsules a bedtime    promethazine (PHENERGAN) 12.5 mg, oral, Every 8 hours PRN    rizatriptan MLT (MAXALT-MLT) 10 mg, oral, Once as needed, May repeat in 2 hours if unresolved. Do not exceed 30 mg in 24 hours.    topiramate (TOPAMAX) 100 mg, oral, Daily    valACYclovir (Valtrex) 1 gram tablet 1 tablet every 12 hours for one day. Indicated for cold sore outbreak.         MR lumbar spine wo IV contrast 06/09/2025    Narrative  Interpreted By:  Dmeetrio Bradford,  and Juan Luis Tipton  STUDY:  MR LUMBAR SPINE WO IV CONTRAST;  6/9/2025 4:02 pm    INDICATION:  Signs/Symptoms:pain.    ,M54.16 Radiculopathy, lumbar region    COMPARISON:  MR lumbar spine 12/01/2020    ACCESSION NUMBER(S):  [id number]    ORDERING CLINICIAN:  ROSAURA ALLEN    TECHNIQUE:  Sagittal T1, T2, STIR, axial T1 and T2 weighted images of the lumbar  spine were acquired.    FINDINGS:  This report assumes 5 non-rib-bearing lumbar vertebral bodies. The  lowest intervertebral disc will be labeled L5-S1.    Conus: The lower thoracic cord appears unremarkable. The conus  terminates at superior L2 vertebral body level.    Alignment: There is straightening of the lumbar spine with loss of  normal lordosis. Grade 1 L2-L3 retrolisthesis. Grade 1 L5-S1  retrolisthesis. There is moderate levoscoliosis centered at the L3-L4  level, more pronounced as compared to prior.    Vertebrae/Intervertebral Discs: The vertebral bodies demonstrate  expected height. Mild type 1 Modic endplate signal change at L3-L4  along the rightward aspect. There is also trace type 1 Modic endplate  signal change along the leftward aspect of L5-S1. There is multilevel  intervertebral disc desiccation. There is moderate loss of  intervertebral disc height at L2-L3, L3-L4, and L5-S1, worsened at  L3-L4 in the interim.      T12-L1:  There is no spinal canal or neural  foraminal stenosis.    L1-2:  There is no spinal canal or neural foraminal stenosis.    L2-3: Mild disc bulge. There is no significant spinal canal or neural  foraminal stenosis.    L3-4: Mild disc bulge and hypertrophic endplate spurring, worse along  the rightward aspect. Trace bilateral facet arthropathy. Mild  narrowing of the right lateral recess. No spinal canal stenosis. Mild  right and minimal left neural foraminal narrowing.    L4-5: Mild bilateral facet arthropathy and ligamentum flavum  hypertrophy. Mild disc bulge and endplate spurring. No spinal canal  stenosis. Mild bilateral neural foraminal narrowing.    L5-S1: Mild left facet spurring. Disc bulge and hypertrophic endplate  spurring, predominantly along the leftward aspect. There is left  lateral recess narrowing with potential compression of the descending  S1 nerve roots (series 8, image 16). There is no additional spinal  canal stenosis. No substantial right and moderate left neural  foraminal narrowing.    Mild fatty infiltration of the inferior posterior paraspinal  musculature.    Impression  1. Worsening lumbar levoscoliosis centered at the L3-L4 level.  2. Mild interval worsening of lumbar degenerative change. There is  left lateral recess narrowing at L5-S1 with potential compression of  the descending left S1 nerve roots. There is also moderate left L5  neural foraminal narrowing.  3. No lumbar spinal canal stenosis at any level.    I personally reviewed the images/study and I agree with the findings  as stated by Danuta Mcknight MD (PGY-2). This study was interpreted at  [place], [place].    MACRO:  None    Signed by: Demetrio Bradford 6/10/2025 4:59 PM  Dictation workstation:   [id number]      MR cervical spine wo IV contrast 12/18/2024    Narrative  Interpreted By:  Kip Mesa,  STUDY:  MR CERVICAL SPINE WO IV CONTRAST;  12/18/2024 4:12 pm    INDICATION:  Signs/Symptoms:MRI CERVICAL,   ORDER IS SCANNED. ,M54.12  Radiculopathy, cervical region    COMPARISON:  May 2022.    ACCESSION NUMBER(S):  [id number]    ORDERING CLINICIAN:  FERNANDO HOUGH    TECHNIQUE:  The cervical spine was studied in the sagittal and axial planes  utilizing T1 and T2 weighted images.    FINDINGS:  The craniovertebral junction is normal. The cord is normal in size  and signal. The marrow signal is normal. Serial axial images reveal  the following: C2/C3  There is normal alignment and vertebral body height. The disc space  is normal. There is no evidence of canal or foraminal narrowing.  There is no evidence of bulging or herniated disc. C3/C4  Mild circumferential bulging intervertebral disc unchanged from the  previous exam. Mild bilateral facet hypertrophy. No measurable canal  or foraminal narrowing C4/C5  Previous anterior discectomy and interbody fusion. No residual canal  or foraminal narrowing C5/C6  Previous discectomy and anterior interbody fusion without residual  canal or foraminal narrowing C6/C7  Unchanged asymmetrical bulging intervertebral disc to the right with  slight flattening of the thecal sac but without measurable central  canal stenosis. No associated foraminal narrowing. C7/T1  There is normal alignment and vertebral body height. The disc space  is normal. There is no evidence of canal or foraminal narrowing.  There is no evidence of bulging or herniated disc.    Impression  * There is no measurable change compared to the previous exam.    MACRO:  none    Signed by: Kip Mesa 12/19/2024 11:54 AM  Dictation workstation:   [id number]       ASSESSMENT/PLAN  Lorraine Trivedi is a 65 y.o. female here for caudal epidural steroid injection under fluoroscopy    Patient denies any recent antibiotic use or infections, denies any blood thinner use, and denies contrast or local anesthetic allergies     Risks, benefits, alternatives discussed. All questions answered to the best of my ability. Patient agrees  to proceed.      Our plan is as follows:  - Proceed with aforementioned procedure     Jason Wick DO  Pain Fellow           [1]   Family History  Problem Relation Name Age of Onset    Diabetes Mother      Breast cancer Mother          mastectomy    Stroke Mother          x2    Cancer Father Pete     Alcohol abuse Father Pete     Stomach cancer Father Pete     Heart attack Brother  50    Heart attack Mother's Brother      Heart attack Maternal Grandfather      Other (pacemaker) Maternal Grandfather      Heart failure Other Grandmother     Heart failure Other grandparent

## 2025-06-13 ENCOUNTER — HOSPITAL ENCOUNTER (OUTPATIENT)
Facility: HOSPITAL | Age: 65
Discharge: HOME | End: 2025-06-13
Payer: COMMERCIAL

## 2025-06-13 VITALS
OXYGEN SATURATION: 99 % | HEIGHT: 71 IN | WEIGHT: 217 LBS | DIASTOLIC BLOOD PRESSURE: 73 MMHG | TEMPERATURE: 97.2 F | HEART RATE: 67 BPM | RESPIRATION RATE: 16 BRPM | BODY MASS INDEX: 30.38 KG/M2 | SYSTOLIC BLOOD PRESSURE: 129 MMHG

## 2025-06-13 DIAGNOSIS — M54.16 LUMBAR RADICULOPATHY: ICD-10-CM

## 2025-06-13 PROCEDURE — 2500000004 HC RX 250 GENERAL PHARMACY W/ HCPCS (ALT 636 FOR OP/ED): Performed by: ANESTHESIOLOGY

## 2025-06-13 PROCEDURE — 2550000001 HC RX 255 CONTRASTS: Mod: JW | Performed by: ANESTHESIOLOGY

## 2025-06-13 PROCEDURE — 62323 NJX INTERLAMINAR LMBR/SAC: CPT | Performed by: ANESTHESIOLOGY

## 2025-06-13 RX ORDER — MIDAZOLAM HYDROCHLORIDE 1 MG/ML
INJECTION, SOLUTION INTRAMUSCULAR; INTRAVENOUS
Status: COMPLETED | OUTPATIENT
Start: 2025-06-13 | End: 2025-06-13

## 2025-06-13 RX ORDER — LIDOCAINE HYDROCHLORIDE 5 MG/ML
INJECTION, SOLUTION INFILTRATION; INTRAVENOUS
Status: COMPLETED | OUTPATIENT
Start: 2025-06-13 | End: 2025-06-13

## 2025-06-13 RX ADMIN — MIDAZOLAM 2 MG: 1 INJECTION INTRAMUSCULAR; INTRAVENOUS at 09:59

## 2025-06-13 RX ADMIN — IOHEXOL 1 ML: 240 INJECTION, SOLUTION INTRATHECAL; INTRAVASCULAR; INTRAVENOUS; ORAL at 10:02

## 2025-06-13 RX ADMIN — LIDOCAINE HYDROCHLORIDE 12 ML: 5 INJECTION, SOLUTION INFILTRATION at 10:01

## 2025-06-13 ASSESSMENT — PAIN - FUNCTIONAL ASSESSMENT
PAIN_FUNCTIONAL_ASSESSMENT: 0-10
PAIN_FUNCTIONAL_ASSESSMENT: 0-10
PAIN_FUNCTIONAL_ASSESSMENT: WONG-BAKER FACES
PAIN_FUNCTIONAL_ASSESSMENT: 0-10

## 2025-06-13 ASSESSMENT — PAIN SCALES - GENERAL
PAINLEVEL_OUTOF10: 7
PAINLEVEL_OUTOF10: 0 - NO PAIN
PAINLEVEL_OUTOF10: 0 - NO PAIN
PAINLEVEL_OUTOF10: 5 - MODERATE PAIN

## 2025-06-13 NOTE — DISCHARGE INSTRUCTIONS
DISCHARGE INSTRUCTIONS FOR INJECTIONS     You underwent caudal epidural steroid injection today    After most injections, it is recommended that you relax and limit your activity for the remainder of the day unless you have been told otherwise by your pain physician.  You should not drive a car, operate machinery, or make important legal decisions unless otherwise directed by your pain physician.  You may resume your normal activity, including exercise, tomorrow.      For all injections, please keep the injection site dry and inspect the site for a couple of days. You may remove the Band-Aid the day of the injection at any time.     Some discomfort, bruising or slight swelling may occur at the injection site. This is not abnormal if it occurs.  If needed you may:    -Take over the counter medication such as Tylenol or Motrin.   -Apply an ice pack for 30 minutes, 2 to 3 times a day for the first 24 hours.     You may shower today; no soaking baths, hot tubs, whirlpools or swimming pools for two days.      If you are given steroids in your injection, it may take 3-5 days for the steroid medication to take effect. You may notice a worsening of your symptoms for 1-2 days after the injection. This is not abnormal.  You may use acetaminophen, ibuprofen, or prescription medication that your doctor may have prescribed for you if you need to do so.     A few common side effects of steroids include facial flushing, sweating, restlessness, irritability,difficulty sleeping, increase in blood sugar, and increased blood pressure. If you have diabetes, please monitor your blood sugar at least once a day for at least 5 days. If you have poorly controlled high blood pressure, monitoryour blood pressure for at least 2 days and contact your primary care physician if these numbers are unusually high for you.      If you take aspirin or non-steroidal anti-inflammatory drugs (examples are Motrin, Advil, ibuprofen, Naprosyn, Voltaren,  Relafen, etc.) you may restart these this evening, but stop taking it 3 days before your next appointment, unless instructed otherwiseby your physician.      You do not need to discontinue non-aspirin-containing pain medications prior to an injection (examples: Celebrex, tramadol, hydrocodone and acetaminophen).      If you take a blood thinning medication (Coumadin, Lovenox, Fragmin,Ticlid, Plavix, Pradaxa, etc.), please discuss this with your primary care physician/cardiologist and your pain physician. These medications MUST be discontinued before you can have an injection safely, without the risk of uncontrolled bleeding. If these medications are not discontinued for an appropriate period of time, you will not be able to receivean injection. Please adhere to instructions given to you about when to restart your blood thinning medication. If you have any questions please reach out to our team.    If you are taking Coumadin, please have your INR checked the morning of your procedure and bring the result to your appointment unless otherwise instructed. If your INR is over 1.2, your injection may need to be rescheduled to avoid uncontrolled bleeding from the needle placement.     Call [facility]  and ask for Pain Management at [phone] between 8am-4pm Monday - Friday if you are experiencing the following:    If you received an epidural or spinal injection:    -Headache that doesnot go away with medicine, is worse when sitting or standing up, and is greatly relieved upon lying down.   -Severe pain worse than or different than your baseline pain.   -Chills or fever (101º F or greater).   -Drainage or signs of infection at the injection site     Go directly to the Emergency Department if you are experiencing the following and received an epidural or spinal injection:   -Abrupt weakness or progressive weakness in your legs that starts after you leave the clinic.   -Abrupt severe or worsening numbness in your legs.    -Inability to urinate after the injection or loss of bowel or bladder control without the urge to defecate or urinate.     If you have a clinical question that cannot wait until your next appointment, please call [phone] between 8am-4pm Monday - Friday or send a [employer] message. We do our best to return all non-emergency messages within 24 hours, Monday - Friday. A nurse or physician will return your message. You may also try calling Dr. Sarbjit Burgess's nurse ([phone]), and they will do their best to answer your question(s).    If you need to cancel an appointment, please call the scheduling staff at [phone] during normal business hours or leave a message at least 24 hours in advance.     If you are going to be sedated for your next procedure, you MUST have responsible adult who can legally drive accompany you home. You cannot eat or drink for at least eight hours prior to the planned procedure if you are going to receive sedation. You may take your non-blood thinning medications with a small sip of water.

## 2025-06-20 ENCOUNTER — APPOINTMENT (OUTPATIENT)
Dept: RADIOLOGY | Facility: HOSPITAL | Age: 65
End: 2025-06-20
Payer: COMMERCIAL

## 2025-07-11 ENCOUNTER — OFFICE VISIT (OUTPATIENT)
Dept: URGENT CARE | Facility: URGENT CARE | Age: 65
End: 2025-07-11
Payer: COMMERCIAL

## 2025-07-11 VITALS
OXYGEN SATURATION: 99 % | DIASTOLIC BLOOD PRESSURE: 76 MMHG | TEMPERATURE: 97.5 F | SYSTOLIC BLOOD PRESSURE: 131 MMHG | RESPIRATION RATE: 18 BRPM | HEART RATE: 79 BPM | WEIGHT: 216.05 LBS | BODY MASS INDEX: 30.13 KG/M2

## 2025-07-11 DIAGNOSIS — N39.0 URINARY TRACT INFECTION IN FEMALE: Primary | ICD-10-CM

## 2025-07-11 LAB
POC APPEARANCE, URINE: CLEAR
POC BILIRUBIN, URINE: NEGATIVE
POC BLOOD, URINE: ABNORMAL
POC COLOR, URINE: YELLOW
POC GLUCOSE, URINE: NEGATIVE MG/DL
POC KETONES, URINE: NEGATIVE MG/DL
POC LEUKOCYTES, URINE: ABNORMAL
POC NITRITE,URINE: NEGATIVE
POC PH, URINE: 6 PH
POC PROTEIN, URINE: NEGATIVE MG/DL
POC SPECIFIC GRAVITY, URINE: 1.01
POC UROBILINOGEN, URINE: 0.2 EU/DL

## 2025-07-11 RX ORDER — SULFAMETHOXAZOLE AND TRIMETHOPRIM 800; 160 MG/1; MG/1
1 TABLET ORAL 2 TIMES DAILY
Qty: 14 TABLET | Refills: 0 | Status: SHIPPED | OUTPATIENT
Start: 2025-07-11 | End: 2025-07-18

## 2025-07-11 NOTE — PROGRESS NOTES
Subjective   Patient ID: Lorraine Trivedi is a 65 y.o. female. They present today with a chief complaint of Difficulty Urinating (Discomfort, pain while urinating, hurts to sit, back/flank pain x3 days).    History of Present Illness  See MDM    Past Medical History  Allergies as of 07/11/2025 - Reviewed 07/11/2025   Allergen Reaction Noted    Adhesive tape-silicones Rash and Hives 04/05/2024       Prescriptions Prior to Admission[1]     Medical History[2]    Surgical History[3]     reports that she quit smoking about 9 years ago. Her smoking use included cigarettes. She started smoking about 45 years ago. She has a 35 pack-year smoking history. She has quit using smokeless tobacco. She reports that she does not drink alcohol and does not use drugs.    Review of Systems  ROS is negative unless otherwise stated in HPI.         Objective    Vitals:    07/11/25 1637   BP: 131/76   Pulse: 79   Resp: 18   Temp: 36.4 °C (97.5 °F)   TempSrc: Oral   SpO2: 99%   Weight: 98 kg (216 lb 0.8 oz)     No LMP recorded. Patient is postmenopausal.      VS: As documented in the triage note and EMR flowsheet from this visit was reviewed  General: Well appearing. No acute distress.   Eyes:  Extraocular movements grossly intact. No scleral icterus.   Head: Atraumatic. Normocephalic.     Neck: No meningismus. No gross masses. Full movement through range of motion  ENT: Posterior oropharynx shows no erythema, exudate or edema.  Uvula is midline without edema.  No stridor or trismus  CV: Regular rhythm. No murmurs, rubs, gallops appreciated.   Resp: Clear to auscultation bilaterally. No respiratory distress.    GI: Nontender. Soft. No masses. No rebound, rigidity or guarding. No CVA tenderness   MSK: Symmetric muscle bulk. No gross step offs or deformities.  Skin: Warm, dry. No rashes  Neuro: CN II-VII intact. A&O x3. Speech fluent. Alert. Moving all extremities. Ambulates with normal gait  Psych: Appropriate mood and affect for  situation      Point of Care Test & Imaging Results from this visit  Results for orders placed or performed in visit on 07/11/25   POCT UA Automated manually resulted   Result Value Ref Range    POC Color, Urine Yellow Straw, Yellow, Light-Yellow    POC Appearance, Urine Clear Clear    POC Glucose, Urine NEGATIVE NEGATIVE mg/dl    POC Bilirubin, Urine NEGATIVE NEGATIVE    POC Ketones, Urine NEGATIVE NEGATIVE mg/dl    POC Specific Gravity, Urine 1.010 1.005 - 1.035    POC Blood, Urine MODERATE (2+) (A) NEGATIVE    POC PH, Urine 6.0 No Reference Range Established PH    POC Protein, Urine NEGATIVE NEGATIVE mg/dl    POC Urobilinogen, Urine 0.2 0.2, 1.0 EU/DL    Poc Nitrite, Urine NEGATIVE NEGATIVE    POC Leukocytes, Urine LARGE (3+) (A) NEGATIVE      Imaging  No results found.    Cardiology, Vascular, and Other Imaging  No other imaging results found for the past 2 days      Diagnostic study results (if any) were reviewed by Emmanuelle Mendez PA-C.    Assessment/Plan   Allergies, medications, history, and pertinent labs/EKGs/Imaging reviewed by Emmanuelle Mendez PA-C.     Medical Decision Making  Patient is a 65-year-old female presents for urinary symptoms for the past 4 days.  Patient notes dysuria, abdominal pressure with urination, low back pain and urinary frequency.  On examination, patient well-appearing.  Vitals are stable without fever.  No CVA or abdominal tenderness to palpation.  Urinalysis reveals moderate blood and large leukocyte esterase.  Will initiate patient on Bactrim twice daily for the next 7 days for treatment of UTI.  Will send urine for culture.    Orders and Diagnoses  Diagnoses and all orders for this visit:  Urinary tract infection in female  -     POCT UA Automated manually resulted  -     Urine Culture  -     sulfamethoxazole-trimethoprim (Bactrim DS) 800-160 mg tablet; Take 1 tablet by mouth 2 times a day for 7 days.      Medical Admin Record      Patient disposition: Home    Electronically  signed by Emmanuelle Mendez PA-C  5:20 PM           [1] (Not in a hospital admission)   [2]   Past Medical History:  Diagnosis Date    Abnormal EKG 01/26/2023    Alkaline phosphatase elevation 01/26/2023    Ankle impingement syndrome 01/26/2023    Ankle pain 01/26/2023    Back pain, thoracic 01/26/2023    DDD (degenerative disc disease), cervical 01/26/2023    GERD (gastroesophageal reflux disease)     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    Irritable bowel syndrome     Occipital neuralgia     Radiculopathy, lumbar region 07/22/2015    Chronic radicular lumbar pain    Seasickness 01/26/2023    Simple cyst of kidney 01/26/2023    Thoracic back pain 01/26/2023    Thrush, oral 01/26/2023   [3]   Past Surgical History:  Procedure Laterality Date    EYE SURGERY  04/20/2015    Eye Surgery Lasik    OTHER SURGICAL HISTORY  12/14/2020    Neck surgery - neck fusion    OTHER SURGICAL HISTORY Left 12/14/2020    Foot surgery x3    OTHER SURGICAL HISTORY  12/18/2018    Ankle surgery    TUBAL LIGATION  04/20/2015    Tubal Ligation

## 2025-07-15 ENCOUNTER — APPOINTMENT (OUTPATIENT)
Dept: PRIMARY CARE | Facility: CLINIC | Age: 65
End: 2025-07-15
Payer: COMMERCIAL

## 2025-07-15 VITALS
BODY MASS INDEX: 31.16 KG/M2 | OXYGEN SATURATION: 96 % | DIASTOLIC BLOOD PRESSURE: 78 MMHG | TEMPERATURE: 97.1 F | WEIGHT: 222.6 LBS | HEART RATE: 72 BPM | HEIGHT: 71 IN | SYSTOLIC BLOOD PRESSURE: 126 MMHG

## 2025-07-15 DIAGNOSIS — F17.210 CIGARETTE NICOTINE DEPENDENCE, UNCOMPLICATED: ICD-10-CM

## 2025-07-15 DIAGNOSIS — T36.95XA ANTIBIOTIC-INDUCED YEAST INFECTION: ICD-10-CM

## 2025-07-15 DIAGNOSIS — B37.9 ANTIBIOTIC-INDUCED YEAST INFECTION: ICD-10-CM

## 2025-07-15 DIAGNOSIS — M54.81 BILATERAL OCCIPITAL NEURALGIA: Primary | ICD-10-CM

## 2025-07-15 DIAGNOSIS — Z79.899 CONTROLLED SUBSTANCE AGREEMENT SIGNED: ICD-10-CM

## 2025-07-15 DIAGNOSIS — M50.10 CERVICAL DISC DISORDER WITH RADICULOPATHY: ICD-10-CM

## 2025-07-15 LAB — BACTERIA UR CULT: ABNORMAL

## 2025-07-15 PROCEDURE — 3008F BODY MASS INDEX DOCD: CPT | Performed by: PHYSICIAN ASSISTANT

## 2025-07-15 PROCEDURE — 99214 OFFICE O/P EST MOD 30 MIN: CPT | Performed by: PHYSICIAN ASSISTANT

## 2025-07-15 PROCEDURE — 1036F TOBACCO NON-USER: CPT | Performed by: PHYSICIAN ASSISTANT

## 2025-07-15 RX ORDER — PREGABALIN 75 MG/1
CAPSULE ORAL
Qty: 112 CAPSULE | Refills: 2 | Status: SHIPPED | OUTPATIENT
Start: 2025-07-15

## 2025-07-15 RX ORDER — FLUCONAZOLE 150 MG/1
150 TABLET ORAL
Qty: 2 TABLET | Refills: 0 | Status: SHIPPED | OUTPATIENT
Start: 2025-07-20 | End: 2025-07-15 | Stop reason: WASHOUT

## 2025-07-15 RX ORDER — PREGABALIN 75 MG/1
CAPSULE ORAL
Qty: 112 CAPSULE | Refills: 2 | Status: SHIPPED | OUTPATIENT
Start: 2025-07-15 | End: 2025-07-15

## 2025-07-15 RX ORDER — FLUCONAZOLE 150 MG/1
150 TABLET ORAL
Qty: 2 TABLET | Refills: 0 | Status: SHIPPED | OUTPATIENT
Start: 2025-07-20

## 2025-07-15 RX ORDER — CLONAZEPAM 0.5 MG/1
0.5 TABLET ORAL NIGHTLY
Qty: 30 TABLET | Refills: 2 | Status: SHIPPED | OUTPATIENT
Start: 2025-07-15 | End: 2025-10-13

## 2025-07-15 NOTE — PROGRESS NOTES
Subjective     HPI   Lorraine Trivedi is a 65 y.o. year old female patient with presenting to clinic with concern for   Chief Complaint   Patient presents with    Follow-up     Yeast infection due to antibiotics, med refills     UTI     Started Bactrim Friday night, called and switched to Augmentin by urgent care in [place].     NEED UDS TODAY.    UTI with blood, treated at  4 days ago. Tx Bactrim, not sensitive. Changed to Augmentin today. Has yeast infection now.    BP Readings from Last 5 Encounters:   07/15/25 126/78   07/11/25 131/76   06/13/25 129/73   05/20/25 116/74   04/16/25 112/70     HLD  -pravastatin  Lab Results   Component Value Date    LDLCALC 114 (H) 01/25/2025     The 10-year ASCVD risk score (Amrita WHITLOCK, et al., 2019) is: 4.5%    Values used to calculate the score:      Age: 65 years      Sex: Female      Is Non- : No      Diabetic: No      Tobacco smoker: No      Systolic Blood Pressure: 126 mmHg      Is BP treated: No      HDL Cholesterol: 56.7 mg/dL      Total Cholesterol: 211 mg/dL       GERD  -esomeprazole 40     Migraines  -topamax 100 at bedtime  -maxalt MDT prn  -zofran   -phenergan 12.5     Anxiety  -clonazepam 0.5mg  Qty 30 tab per 30 days, OARRS review, CSA, UDS     Cervical disc disorder w radiculopathy  Dr Blanca Schaffer pain mgt  - steroid injections  I write lyrica:  -Lyrica 4 cap/day qty 112 cap per 30 days, OARRS review, CSA, UDS reviewed     Occipital neuralgia & Trigeminal neuralgia & migraines  -botox injections  Dr Eason Neuro  Occipital neuralgia    -pain injections  Dr Sotomayor Spinal Surgeon        Allergic rhinitis  -flonase     Recurrent cold sores  -valtrex 1g 2 tab bid PRN outbreak     IBS  Iqra Sheila  -Xifaxan  -anaspaz (not extended release)     Vertigo  -meclizine 12.5  -zofran      Polyarthralgia and stiffness  Tested negative for autoimmune diseases 1/2024     Left Knee-   arthroscopic debridement summer 2024 for miniscus   [facility]     Plantar fasciitis.   Continues physical therapy periodically PRN     Cardiology   Dr Trujillo 2022  Cardiology testing for preop at that time      Her mother had a 3rd stroke, needed embolectomy       Specialists  Dr Eason Neuro- migraines- botox injections Trigeminal neuralgia  [facility]- IBS (Anaspaz, not XR) and Xifaxan  Dr Sotomayor Spinal Surgeon- occipital neuralgia injections  Tsering Horton- ENT- esophageal dysphagia  Cardiology 2022 Dr Trujillo-Sinus rhythm with 1st degree A-V block  Dr Blanca Schaffer pain mgt      OARRS:  Natasha Dunbar PA-C on 7/15/2025  2:56 PM  I have personally reviewed the OARRS report for Lorraine Trivedi. I have considered the risks of abuse, dependence, addiction and diversion    Is the patient prescribed a combination of a benzodiazepine and opioid?  No    Last Urine Drug Screen / ordered today: Yes  No results found for this or any previous visit (from the past 8760 hours).  N/A    Clinical rationale for not completing a Urine Drug Screen: pending results      Controlled Substance Agreement:  Date of the Last Agreement: 4/16/25   Reviewed Controlled Substance Agreement including but not limited to the benefits, risks, and alternatives to treatment with a Controlled Substance medication(s).    Benzodiazepines: Clonazepam  What is the patient's goal of therapy? improvement of debilitating effects of anxiety  Is this being achieved with current treatment? yes    RIO-7:  No data recorded    Activities of Daily Living:   Is your overall impression that this patient is benefiting (symptom reduction outweighs side effects) from benzodiazepine therapy? Yes     1. Physical Functioning: Same  2. Family Relationship: Same  3. Social Relationship: Same  4. Mood: Same  5. Sleep Patterns: Same  6. Overall Function: Same     and Lyrica:  What is the patient's goal of therapy? pain relief, improved mobility and quality of life  Is this being achieved with current treatment?  "yes    Pain Assessment:  No data recorded    Activities of Daily Living:  Is your overall impression that this patient is benefiting (symptom reduction outweighs side effects) from Lyrica therapy? Yes     1. Physical Functioning: Same  2. Family Relationship: Same  3. Social Relationship: Same  4. Mood: Same  5. Sleep Patterns: Same  6. Overall Function: Same      Problem List[1]    Medical History[2]   Surgical History[3]   Family History[4]   Social History     Tobacco Use    Smoking status: Former     Current packs/day: 0.00     Average packs/day: 1 pack/day for 35.0 years (35.0 ttl pk-yrs)     Types: Cigarettes     Start date: 7/15/1980     Quit date: 7/15/2015     Years since quitting: 10.0    Smokeless tobacco: Former   Substance Use Topics    Alcohol use: Not Currently      Current Medications[5]     Review of Systems  Constitutional: Denies fever  HEENT: Denies ST, earache  CVS: Denies Chest pain  Pulmonary: Denies wheezing, SOB  GI: Denies N/V  : Denies dysuria  Musculoskeletal:  Denies myalgia  Neuro: Denies focal weakness or numbness.  Skin: Denies Rashes.  *Review of Systems is negative unless otherwise mentioned in HPI or ROS above.    Objective   /78   Pulse 72   Temp 36.2 °C (97.1 °F)   Ht 1.803 m (5' 11\")   Wt 101 kg (222 lb 9.6 oz)   SpO2 96%   BMI 31.05 kg/m²  reviewed Body mass index is 31.05 kg/m².     Physical Exam  Constitutional: NAD.  Resting comfortably.  Head: Atraumatic, normocephalic.  ENT: Moist oral mucosa. Nasal mucosa wnl.   Cardiac: Regular rate & rhythm.   Pulmonary: Lungs clear bilat  GI: Soft, Nontender, nondistended.   Musculoskeletal: No peripheral edema.   Skin: No evidence of trauma. No rashes  Psych: Intact judgement and insight.    .Assessment/Plan   Problem List Items Addressed This Visit           ICD-10-CM    Occipital neuralgia - Primary M54.81    Relevant Medications    clonazePAM (KlonoPIN) 0.5 mg tablet    pregabalin (Lyrica) 75 mg capsule     Other " Visit Diagnoses         Codes      Antibiotic-induced yeast infection     B37.9, T36.95XA    Relevant Medications    fluconazole (Diflucan) 150 mg tablet (Start on 7/20/2025)    Other Relevant Orders    CT lung screening low dose      Cervical disc disorder with radiculopathy     M50.10    Relevant Medications    pregabalin (Lyrica) 75 mg capsule      Cigarette nicotine dependence, uncomplicated     F17.210                 [1]   Patient Active Problem List  Diagnosis    Occipital neuralgia    Cervical myofascial pain syndrome    Chronic lumbar radiculopathy    Dyslipidemia    GERD (gastroesophageal reflux disease)    History of cold sores    Irritable bowel syndrome with diarrhea    Peroneal tendon tear    Pes cavus, congenital    Vertigo    Nausea in adult    Otalgia of both ears    Migraine without aura and without status migrainosus, not intractable    Lumbar stenosis with neurogenic claudication    Facet arthropathy, cervical    Cervical radiculopathy    Postlaminectomy syndrome, cervical region    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    Oral thrush    Hyperglycemia   [2]   Past Medical History:  Diagnosis Date    Abnormal EKG 01/26/2023    Alkaline phosphatase elevation 01/26/2023    Ankle impingement syndrome 01/26/2023    Ankle pain 01/26/2023    Back pain, thoracic 01/26/2023    DDD (degenerative disc disease), cervical 01/26/2023    GERD (gastroesophageal reflux disease)     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    Irritable bowel syndrome     Neuromuscular disorder (Multi)     Occipital neuralgia     Radiculopathy, lumbar region 07/22/2015    Chronic radicular lumbar pain    Seasickness 01/26/2023    Simple cyst of kidney 01/26/2023    Thoracic back pain 01/26/2023    Thrush, oral 01/26/2023   [3]   Past Surgical History:  Procedure Laterality Date    EYE SURGERY  04/20/2015    Eye Surgery Lasik    OTHER SURGICAL HISTORY  12/14/2020    Neck surgery - neck fusion    OTHER SURGICAL HISTORY Left 12/14/2020     Foot surgery x3    OTHER SURGICAL HISTORY  12/18/2018    Ankle surgery    SPINE SURGERY  2020    TUBAL LIGATION  04/20/2015    Tubal Ligation   [4]   Family History  Problem Relation Name Age of Onset    Diabetes Mother      Breast cancer Mother          mastectomy    Stroke Mother          x2    Cancer Father Pete     Alcohol abuse Father Pete     Stomach cancer Father Pete     Heart attack Brother  50    Heart attack Mother's Brother      Heart attack Maternal Grandfather      Other (pacemaker) Maternal Grandfather      Heart failure Other Grandmother     Heart failure Other grandparent     Heart disease Brother William     Heart disease Brother William     Heart disease Brother William     Heart disease Brother William    [5]   Current Outpatient Medications:     amoxicillin-clavulanate (Augmentin) 875-125 mg tablet, Take 1 tablet (875 mg of amoxicillin) by mouth 2 times a day for 7 days., Disp: 14 tablet, Rfl: 0    bismuth subsalicylate (Pepto Bismol) 262 mg chewable tablet, Chew and swallow 2 tablets (524 mg) if needed for indigestion., Disp: , Rfl:     esomeprazole (NexIUM) 40 mg DR capsule, Take 1 capsule (40 mg) by mouth 2 times a day before meals. Do not open capsule., Disp: 60 capsule, Rfl: 11    fluticasone (Flonase) 50 mcg/actuation nasal spray, Administer 1 spray into each nostril once daily. Shake gently. Before first use, prime pump. After use, clean tip and replace cap., Disp: 16 g, Rfl: 11    hyoscyamine (Anaspaz, Levsin) 0.125 mg tablet, Take 1 tablet (0.125 mg) by mouth 4 times a day., Disp: 360 tablet, Rfl: 3    ibuprofen 800 mg tablet, Take 1 tablet (800 mg) by mouth 3 times a day. As needed, Disp: 90 tablet, Rfl: 3    meclizine (Antivert) 12.5 mg tablet, TAKE ONE TABLET BY MOUTH EVERY 8 HOURS AS NEEDED, Disp: 180 tablet, Rfl: 3    melatonin 1 mg tablet, Take 1 tablet (1 mg) by mouth once daily at bedtime., Disp: , Rfl:     methocarbamol (Robaxin) 750 mg tablet, Take 1 tablet (750 mg) by mouth 3 times a  day as needed for muscle spasms. PRN, Disp: 90 tablet, Rfl: 11    pravastatin (Pravachol) 10 mg tablet, Take 1 tablet (10 mg) by mouth once daily at bedtime., Disp: 30 tablet, Rfl: 11    rizatriptan MLT (Maxalt-MLT) 10 mg disintegrating tablet, Take 1 tablet (10 mg) by mouth 1 time if needed for migraine. May repeat in 2 hours if unresolved. Do not exceed 30 mg in 24 hours., Disp: 30 tablet, Rfl: 11    topiramate (Topamax) 100 mg tablet, Take 1 tablet (100 mg) by mouth once daily., Disp: 30 tablet, Rfl: 11    valACYclovir (Valtrex) 1 gram tablet, 1 tablet every 12 hours for one day. Indicated for cold sore outbreak., Disp: 2 tablet, Rfl: 5    albuterol (Ventolin HFA) 90 mcg/actuation inhaler, Inhale 2 puffs every 6 hours if needed for wheezing or shortness of breath for up to 8 days. (Patient not taking: Reported on 7/15/2025), Disp: 8 g, Rfl: 0    clonazePAM (KlonoPIN) 0.5 mg tablet, Take 1 tablet (0.5 mg) by mouth once daily at bedtime. To help sleep with pain and anxiety, Disp: 30 tablet, Rfl: 2    [START ON 7/20/2025] fluconazole (Diflucan) 150 mg tablet, Take 1 tablet (150 mg) by mouth 1 (one) time per week., Disp: 2 tablet, Rfl: 0    pregabalin (Lyrica) 75 mg capsule, 1 capsule PO in the morning, 1 capsule in the afternoon, 2 capsules a bedtime, Disp: 112 capsule, Rfl: 2

## 2025-07-19 LAB
1OH-MIDAZOLAM UR-MCNC: NEGATIVE NG/ML
7AMINOCLONAZEPAM UR-MCNC: 28 NG/ML
A-OH ALPRAZ UR-MCNC: NEGATIVE NG/ML
A-OH-TRIAZOLAM UR-MCNC: NEGATIVE NG/ML
AMPHETAMINES UR QL: NEGATIVE NG/ML
BARBITURATES UR QL: NEGATIVE NG/ML
BZE UR QL: NEGATIVE NG/ML
CODEINE UR-MCNC: NEGATIVE NG/ML
CREAT UR-MCNC: 11.1 MG/DL
DRUG SCREEN COMMENT UR-IMP: ABNORMAL
EDDP UR-MCNC: NEGATIVE NG/ML
FENTANYL UR-MCNC: NEGATIVE NG/ML
HYDROCODONE UR-MCNC: NEGATIVE NG/ML
HYDROMORPHONE UR-MCNC: NEGATIVE NG/ML
LORAZEPAM UR-MCNC: NEGATIVE NG/ML
METHADONE UR-MCNC: NEGATIVE NG/ML
MORPHINE UR-MCNC: NEGATIVE NG/ML
NORDIAZEPAM UR-MCNC: NEGATIVE NG/ML
NORFENTANYL UR-MCNC: NEGATIVE NG/ML
NORHYDROCODONE UR CFM-MCNC: NEGATIVE NG/ML
NOROXYCODONE UR CFM-MCNC: NEGATIVE NG/ML
NORTRAMADOL UR-MCNC: NEGATIVE NG/ML
OH-ETHYLFLURAZ UR-MCNC: NEGATIVE NG/ML
OXAZEPAM UR-MCNC: NEGATIVE NG/ML
OXIDANTS UR QL: NEGATIVE MCG/ML
OXYCODONE UR CFM-MCNC: NEGATIVE NG/ML
OXYMORPHONE UR CFM-MCNC: NEGATIVE NG/ML
PCP UR QL: NEGATIVE NG/ML
PH UR: 5.7 [PH] (ref 4.5–9)
QUEST 6 ACETYLMORPHINE: NEGATIVE NG/ML
QUEST NOTES AND COMMENTS: ABNORMAL
QUEST ZOLPIDEM: NEGATIVE NG/ML
SP GR UR: 1
TEMAZEPAM UR-MCNC: NEGATIVE NG/ML
THC UR QL: NEGATIVE NG/ML
TRAMADOL UR-MCNC: NEGATIVE NG/ML
ZOLPIDEM PHENYL-4-CARB UR CFM-MCNC: NEGATIVE NG/ML

## 2025-08-19 ENCOUNTER — HOSPITAL ENCOUNTER (OUTPATIENT)
Dept: RADIOLOGY | Facility: HOSPITAL | Age: 65
Discharge: HOME | End: 2025-08-19
Payer: COMMERCIAL

## 2025-08-19 VITALS — BODY MASS INDEX: 31.08 KG/M2 | WEIGHT: 222 LBS | HEIGHT: 71 IN

## 2025-08-19 DIAGNOSIS — Z12.31 SCREENING MAMMOGRAM FOR BREAST CANCER: ICD-10-CM

## 2025-08-19 PROCEDURE — 77063 BREAST TOMOSYNTHESIS BI: CPT

## 2025-08-19 PROCEDURE — 77067 SCR MAMMO BI INCL CAD: CPT | Performed by: RADIOLOGY

## 2025-08-19 PROCEDURE — 77063 BREAST TOMOSYNTHESIS BI: CPT | Performed by: RADIOLOGY

## 2025-08-20 ENCOUNTER — TELEPHONE (OUTPATIENT)
Dept: PRIMARY CARE | Facility: CLINIC | Age: 65
End: 2025-08-20
Payer: COMMERCIAL

## 2025-08-21 ENCOUNTER — HOSPITAL ENCOUNTER (OUTPATIENT)
Dept: RADIOLOGY | Facility: HOSPITAL | Age: 65
Discharge: HOME | End: 2025-08-21
Payer: COMMERCIAL

## 2025-08-21 DIAGNOSIS — B37.9 ANTIBIOTIC-INDUCED YEAST INFECTION: ICD-10-CM

## 2025-08-21 DIAGNOSIS — M54.12 CERVICAL RADICULOPATHY: ICD-10-CM

## 2025-08-21 DIAGNOSIS — M96.1 POSTLAMINECTOMY SYNDROME, CERVICAL REGION: ICD-10-CM

## 2025-08-21 DIAGNOSIS — T36.95XA ANTIBIOTIC-INDUCED YEAST INFECTION: ICD-10-CM

## 2025-08-21 DIAGNOSIS — M72.2 PLANTAR FASCIITIS, BILATERAL: Primary | ICD-10-CM

## 2025-08-21 DIAGNOSIS — S86.319S: ICD-10-CM

## 2025-08-21 DIAGNOSIS — M79.18 CERVICAL MYOFASCIAL PAIN SYNDROME: Primary | ICD-10-CM

## 2025-08-21 PROCEDURE — 71271 CT THORAX LUNG CANCER SCR C-: CPT

## 2025-10-14 ENCOUNTER — APPOINTMENT (OUTPATIENT)
Dept: PRIMARY CARE | Facility: CLINIC | Age: 65
End: 2025-10-14
Payer: COMMERCIAL

## 2026-01-14 ENCOUNTER — APPOINTMENT (OUTPATIENT)
Dept: PRIMARY CARE | Facility: CLINIC | Age: 66
End: 2026-01-14
Payer: COMMERCIAL